# Patient Record
Sex: MALE | Race: BLACK OR AFRICAN AMERICAN | NOT HISPANIC OR LATINO | Employment: FULL TIME | URBAN - METROPOLITAN AREA
[De-identification: names, ages, dates, MRNs, and addresses within clinical notes are randomized per-mention and may not be internally consistent; named-entity substitution may affect disease eponyms.]

---

## 2018-07-11 ENCOUNTER — OFFICE VISIT - HEALTHEAST (OUTPATIENT)
Dept: CARDIOLOGY | Facility: CLINIC | Age: 38
End: 2018-07-11

## 2018-07-11 DIAGNOSIS — R07.9 CHEST PAIN WITH LOW RISK OF ACUTE CORONARY SYNDROME: ICD-10-CM

## 2018-07-11 ASSESSMENT — MIFFLIN-ST. JEOR: SCORE: 1901.69

## 2019-07-25 ENCOUNTER — OFFICE VISIT - HEALTHEAST (OUTPATIENT)
Dept: FAMILY MEDICINE | Facility: CLINIC | Age: 39
End: 2019-07-25

## 2019-07-25 DIAGNOSIS — Z00.00 ROUTINE HISTORY AND PHYSICAL EXAMINATION OF ADULT: ICD-10-CM

## 2019-07-25 DIAGNOSIS — R07.9 LEFT-SIDED CHEST PAIN: ICD-10-CM

## 2019-07-25 LAB
ANION GAP SERPL CALCULATED.3IONS-SCNC: 11 MMOL/L (ref 5–18)
BUN SERPL-MCNC: 12 MG/DL (ref 8–22)
CALCIUM SERPL-MCNC: 10.1 MG/DL (ref 8.5–10.5)
CHLORIDE BLD-SCNC: 104 MMOL/L (ref 98–107)
CHOLEST SERPL-MCNC: 199 MG/DL
CO2 SERPL-SCNC: 25 MMOL/L (ref 22–31)
CREAT SERPL-MCNC: 1.03 MG/DL (ref 0.7–1.3)
FASTING STATUS PATIENT QL REPORTED: ABNORMAL
GFR SERPL CREATININE-BSD FRML MDRD: >60 ML/MIN/1.73M2
GLUCOSE BLD-MCNC: 96 MG/DL (ref 70–125)
HDLC SERPL-MCNC: 59 MG/DL
LDLC SERPL CALC-MCNC: 97 MG/DL
POTASSIUM BLD-SCNC: 3.7 MMOL/L (ref 3.5–5)
SODIUM SERPL-SCNC: 140 MMOL/L (ref 136–145)
TRIGL SERPL-MCNC: 215 MG/DL

## 2019-07-25 ASSESSMENT — MIFFLIN-ST. JEOR: SCORE: 1831.38

## 2019-07-26 ENCOUNTER — COMMUNICATION - HEALTHEAST (OUTPATIENT)
Dept: FAMILY MEDICINE | Facility: CLINIC | Age: 39
End: 2019-07-26

## 2019-08-02 ENCOUNTER — HOSPITAL ENCOUNTER (OUTPATIENT)
Dept: CARDIOLOGY | Facility: CLINIC | Age: 39
Discharge: HOME | End: 2019-08-02

## 2019-08-02 DIAGNOSIS — R07.9 LEFT-SIDED CHEST PAIN: ICD-10-CM

## 2019-08-02 LAB
CV STRESS CURRENT BP HE: NORMAL
CV STRESS CURRENT HR HE: 103
CV STRESS CURRENT HR HE: 103
CV STRESS CURRENT HR HE: 104
CV STRESS CURRENT HR HE: 105
CV STRESS CURRENT HR HE: 105
CV STRESS CURRENT HR HE: 106
CV STRESS CURRENT HR HE: 106
CV STRESS CURRENT HR HE: 111
CV STRESS CURRENT HR HE: 115
CV STRESS CURRENT HR HE: 117
CV STRESS CURRENT HR HE: 118
CV STRESS CURRENT HR HE: 119
CV STRESS CURRENT HR HE: 121
CV STRESS CURRENT HR HE: 121
CV STRESS CURRENT HR HE: 126
CV STRESS CURRENT HR HE: 129
CV STRESS CURRENT HR HE: 130
CV STRESS CURRENT HR HE: 130
CV STRESS CURRENT HR HE: 131
CV STRESS CURRENT HR HE: 132
CV STRESS CURRENT HR HE: 143
CV STRESS CURRENT HR HE: 145
CV STRESS CURRENT HR HE: 156
CV STRESS CURRENT HR HE: 156
CV STRESS CURRENT HR HE: 165
CV STRESS CURRENT HR HE: 167
CV STRESS CURRENT HR HE: 170
CV STRESS CURRENT HR HE: 171
CV STRESS CURRENT HR HE: 171
CV STRESS CURRENT HR HE: 83
CV STRESS CURRENT HR HE: 88
CV STRESS DEVIATION TIME HE: NORMAL
CV STRESS ECHO PERCENT HR HE: NORMAL
CV STRESS EXERCISE STAGE HE: NORMAL
CV STRESS FINAL RESTING BP HE: NORMAL
CV STRESS FINAL RESTING HR HE: 105
CV STRESS MAX HR HE: 174
CV STRESS MAX TREADMILL GRADE HE: 18
CV STRESS MAX TREADMILL SPEED HE: 5
CV STRESS PEAK DIA BP HE: NORMAL
CV STRESS PEAK SYS BP HE: NORMAL
CV STRESS PHASE HE: NORMAL
CV STRESS PROTOCOL HE: NORMAL
CV STRESS RESTING PT POSITION HE: NORMAL
CV STRESS RESTING PT POSITION HE: NORMAL
CV STRESS ST DEVIATION AMOUNT HE: NORMAL
CV STRESS ST DEVIATION ELEVATION HE: NORMAL
CV STRESS ST EVELATION AMOUNT HE: NORMAL
CV STRESS TEST TYPE HE: NORMAL
CV STRESS TOTAL STAGE TIME MIN 1 HE: NORMAL
ECHO EJECTION FRACTION ESTIMATED: 60 %
STRESS ECHO BASELINE BP: NORMAL
STRESS ECHO BASELINE HR: 84
STRESS ECHO CALCULATED PERCENT HR: 96 %
STRESS ECHO LAST STRESS BP: NORMAL
STRESS ECHO LAST STRESS HR: 171
STRESS ECHO POST ESTIMATED WORKLOAD: 12.3
STRESS ECHO POST EXERCISE DUR MIN: 12
STRESS ECHO POST EXERCISE DUR SEC: 0
STRESS ECHO TARGET HR: 154

## 2019-08-05 ENCOUNTER — COMMUNICATION - HEALTHEAST (OUTPATIENT)
Dept: FAMILY MEDICINE | Facility: CLINIC | Age: 39
End: 2019-08-05

## 2019-08-09 ENCOUNTER — COMMUNICATION - HEALTHEAST (OUTPATIENT)
Dept: FAMILY MEDICINE | Facility: CLINIC | Age: 39
End: 2019-08-09

## 2020-07-30 ENCOUNTER — OFFICE VISIT - HEALTHEAST (OUTPATIENT)
Dept: FAMILY MEDICINE | Facility: CLINIC | Age: 40
End: 2020-07-30

## 2020-07-30 DIAGNOSIS — K21.9 GASTROESOPHAGEAL REFLUX DISEASE WITHOUT ESOPHAGITIS: ICD-10-CM

## 2020-07-30 DIAGNOSIS — R05.9 COUGH: ICD-10-CM

## 2020-07-30 RX ORDER — FAMOTIDINE 20 MG/1
20 TABLET, FILM COATED ORAL 2 TIMES DAILY
Qty: 60 TABLET | Refills: 1 | Status: SHIPPED | OUTPATIENT
Start: 2020-07-30 | End: 2022-01-05

## 2020-10-07 ENCOUNTER — OFFICE VISIT - HEALTHEAST (OUTPATIENT)
Dept: FAMILY MEDICINE | Facility: CLINIC | Age: 40
End: 2020-10-07

## 2020-10-07 DIAGNOSIS — R05.9 COUGH: ICD-10-CM

## 2020-10-07 DIAGNOSIS — K21.9 GASTROESOPHAGEAL REFLUX DISEASE, UNSPECIFIED WHETHER ESOPHAGITIS PRESENT: ICD-10-CM

## 2020-10-07 DIAGNOSIS — J02.9 SORE THROAT: ICD-10-CM

## 2020-10-07 DIAGNOSIS — R03.0 ELEVATED BLOOD PRESSURE READING WITHOUT DIAGNOSIS OF HYPERTENSION: ICD-10-CM

## 2020-10-07 LAB — DEPRECATED S PYO AG THROAT QL EIA: NORMAL

## 2020-10-08 LAB — GROUP A STREP BY PCR: NORMAL

## 2020-10-10 ENCOUNTER — COMMUNICATION - HEALTHEAST (OUTPATIENT)
Dept: SCHEDULING | Facility: CLINIC | Age: 40
End: 2020-10-10

## 2020-11-06 ENCOUNTER — OFFICE VISIT - HEALTHEAST (OUTPATIENT)
Dept: FAMILY MEDICINE | Facility: CLINIC | Age: 40
End: 2020-11-06

## 2020-11-06 DIAGNOSIS — I10 ESSENTIAL HYPERTENSION: ICD-10-CM

## 2020-11-06 DIAGNOSIS — Z72.0 TOBACCO USE: ICD-10-CM

## 2020-11-06 RX ORDER — HYDROCHLOROTHIAZIDE 25 MG/1
25 TABLET ORAL DAILY
Qty: 30 TABLET | Refills: 5 | Status: SHIPPED | OUTPATIENT
Start: 2020-11-06 | End: 2022-01-05

## 2021-05-27 VITALS
SYSTOLIC BLOOD PRESSURE: 174 MMHG | OXYGEN SATURATION: 98 % | HEART RATE: 90 BPM | TEMPERATURE: 98.7 F | DIASTOLIC BLOOD PRESSURE: 104 MMHG | RESPIRATION RATE: 16 BRPM

## 2021-05-27 VITALS
TEMPERATURE: 98 F | OXYGEN SATURATION: 98 % | SYSTOLIC BLOOD PRESSURE: 132 MMHG | HEART RATE: 88 BPM | DIASTOLIC BLOOD PRESSURE: 95 MMHG

## 2021-05-30 NOTE — PROGRESS NOTES
Assessment/Plan:        1. Routine history and physical examination of adult    - Basic Metabolic Panel  - Lipid Profile  - Tdap vaccine,  8yo or older,  IM    2. Left-sided chest pain  Exam findings were discussed   Patient concerned with underlying heart disease.   Condition likely musculoskeletal    Plan;   Rule out cardiac etiology   - Echo Stress Exercise; Future      We will follow-up to the results of the study and manage accordingly.      ______________________________________________________________________     Brent CHADD Lau is a 39 y.o. male here for healthcare establishment, routine physical.     Other issues to discuss/ evaluate today:   Has been having a left upper chest/shoulder pain since July 4, taking a new job which she is requiring him to work about 11 to 12 hours a shift and lifting all day long.  He has since left the job but continued to have the pain that side said to be more of a tightness feeling worse with standing and better with sitting.  Can also point to over the location of the pain is as if he can grab where it is.  He is concerned that this might be related to his heart with been a smoker and has been a family history of diabetes.          Past Medical History:   Diagnosis Date     Acid reflux        No past surgical history on file.     Family History   Problem Relation Age of Onset     Diabetes Mother      No Medical Problems Sister      No Medical Problems Brother        Social History     Socioeconomic History     Marital status:      Spouse name: None     Number of children: None     Years of education: None     Highest education level: None   Occupational History     None   Social Needs     Financial resource strain: None     Food insecurity:     Worry: None     Inability: None     Transportation needs:     Medical: None     Non-medical: None   Tobacco Use     Smoking status: Current Every Day Smoker     Types: Cigarettes     Smokeless tobacco: Former User   Substance  "and Sexual Activity     Alcohol use: None     Drug use: None     Sexual activity: None   Lifestyle     Physical activity:     Days per week: None     Minutes per session: None     Stress: None   Relationships     Social connections:     Talks on phone: None     Gets together: None     Attends Anabaptist service: None     Active member of club or organization: None     Attends meetings of clubs or organizations: None     Relationship status: None     Intimate partner violence:     Fear of current or ex partner: None     Emotionally abused: None     Physically abused: None     Forced sexual activity: None   Other Topics Concern     None   Social History Narrative     None        No current outpatient medications on file.     Immunization History   Administered Date(s) Administered     Influenza,seasonal quad, PF, 36+MOS 12/29/2014     MMR 10/22/1997     Td, Adult, Absorbed 09/20/1994     Tdap 12/29/2014, 07/25/2019        ______________________________________________________________________     ROS:  General : negative  Ophthalmic : negative  ENT : negative  Respiratory : no cough, shortness of breath, or wheezing  Cardiovascular : no chest pain or dyspnea on exertion  Gastrointestinal : no abdominal pain, change in bowel habits, or black or bloody stools  Genito-Urinary : no dysuria, trouble voiding, or hematuria  Musculoskeletal : See HPI   Neurological : negative  Dermatological : negative    Allergy and Immunology : negative  Hematological and Lymphatic : negative  Endocrine : negative     ______________________________________________________________________     Exam:    Wt Readings from Last 3 Encounters:   07/25/19 196 lb 8 oz (89.1 kg)   07/11/18 212 lb (96.2 kg)   07/08/18 212 lb 7 oz (96.4 kg)     BP Readings from Last 3 Encounters:   07/25/19 136/88   07/11/18 124/78   07/08/18 140/85     /88 (Patient Site: Right Arm, Patient Position: Sitting, Cuff Size: Adult Large)   Pulse 93   Ht 5' 11.5\" (1.816 " m)   Wt 196 lb 8 oz (89.1 kg)   SpO2 98%   BMI 27.02 kg/m       General appearance - alert, well appearing, and in no distress  Mental status - alert, oriented to person, place, and time  Eyes - pupils equal and reactive, extraocular eye movements intact  Ears - bilateral TM's and external ear canals normal  Nose - normal and patent, no erythema, discharge or polyps  Mouth - mucous membranes moist, pharynx normal without lesions  Neck - supple, no significant adenopathy, thyroid exam: thyroid is normal in size without nodules or tenderness  Lymphatics - no palpable lymphadenopathy  Chest - clear to auscultation, no wheezes, rales or rhonchi, symmetric air entry  Heart - normal rate, regular rhythm, normal S1, S2, no murmurs, rubs, clicks or gallops  Abdomen - soft, nontender, nondistended, no masses or organomegaly   Male - no penile lesions or discharge, no testicular masses or tenderness, no hernias  Back exam - full range of motion, no tenderness, palpable spasm or pain on motion  Neurological - alert, oriented, normal speech, no focal findings or movement / tremor disorder noted  Musculoskeletal - no joint tenderness, deformity or swelling  Extremities - peripheral pulses normal, no pedal edema, no clubbing or cyanosis  Skin - normal coloration and turgor, no rashes, no suspicious skin lesions noted

## 2021-05-31 NOTE — TELEPHONE ENCOUNTER
Test Results  Who is calling?:  Patient  Who ordered the test:  Bo Ribeiro MD  Type of test: Other: Stress Echo  Date of test:  8/2/19  Where was the test performed:  [facility]  What are your questions/concerns?:  Patient was asking for results. Read to patient the findings noted on letter sent to patient on 8/5/19, patient made aware that results are on his my chart and that a letter is in the mail  Okay to leave a detailed message?:  No  No call back needed.

## 2021-06-01 VITALS — HEIGHT: 72 IN | WEIGHT: 212 LBS | BODY MASS INDEX: 28.71 KG/M2

## 2021-06-03 VITALS — WEIGHT: 196.5 LBS | BODY MASS INDEX: 26.61 KG/M2 | HEIGHT: 72 IN

## 2021-06-05 VITALS
BODY MASS INDEX: 22.69 KG/M2 | HEART RATE: 105 BPM | DIASTOLIC BLOOD PRESSURE: 78 MMHG | SYSTOLIC BLOOD PRESSURE: 132 MMHG | TEMPERATURE: 98.9 F | OXYGEN SATURATION: 98 % | WEIGHT: 165 LBS

## 2021-06-10 NOTE — PROGRESS NOTES
Assessment/Plan:   Cough  Gastroesophageal reflux disease without esophagitis  Cough x 1 week. Intermittent low grade fever. Recent covid test negative. CXR normal. Consider URI viral/bronchitis, GERD, allergy.   We will start pepcid for 2 weeks, reflux precautions.   I discussed red flag symptoms, return precautions to clinic/ER and follow up care with patient/parent.  Expected clinical course, symptomatic cares advised. Questions answered. Patient/parent amenable with plan.  - XR Chest 2 Views  - famotidine (PEPCID) 20 MG tablet; Take 1 tablet (20 mg total) by mouth 2 (two) times a day.  Dispense: 60 tablet; Refill: 1    The chest xray looks normal.   You may have a bronchial type cough commonly related to a cold, smoking or allergy.  The harsh cough and vomiting associated with cough may be related to acid reflux - stomach contents coming and causing the gag reflex to trigger.   Alcohol, nicotine, spicy or acidic foods, tomato, chocolate, carbonated drinks, caffeine can all aggravate reflux.   Also eating and drinking large meals or late at night or lying down right after can make this happen.   Follow reflux avoidance precautions  Consider taking Pepcid AC twice daily or omeprazole daily to calm things down if persistent. I can send in prescriptions for you if needed.   Cough drops are fine to settle the cough as needed.   May use Mucinex DM (or generic equivalent) to loosen up any mucus in the chest so you don't need to cough so hard.   I would recommend keeping to yourself another few days (10 days from onset of cough and 3 days no fever is the guideline unless the cough or other symptoms are worse and then either recheck the cough or wait until it is improving.     If worse or no better then we can reassess - fever, cough productive of dark phlegm, wheeze or shortness of breath.     Patient wore mask throughout the visit.   Provider and rooming staff and radiology staff wore mask, shield and gloves.      Subjective:      Brent Lau is a 40 y.o. male who presents for evaluation of cough. This started about a week ago. Thursday he developed ST. Friday he developed cough associated with post tussive emesis. Saturday he had low grade fever Tm99.1. He had a covid test on Saturday at [facility] and that result came back yesterday as negative. He had low grade fever again today and has continued to have some cough, more chest congestion today. No URI sxs, ST has resolved.  No loss of smell or taste, but he has poor appetite - has mainly been eating fruit and vitamin water. No fatigue or myalgia. No N/V/D. He does have heartburn.   He is not currently working due to pandemic.   His sister has URI sxs. No other known ill contacts.   Daily smoker.     No Known Allergies  No current outpatient medications on file prior to visit.     No current facility-administered medications on file prior to visit.      Patient Active Problem List   Diagnosis     H/O echocardiogram       Objective:     BP (!) 132/95   Pulse 88   Temp 98  F (36.7  C)   SpO2 98%     Physical  General Appearance: Alert, cooperative, no distress, AVSS  Head: Normocephalic, without obvious abnormality, atraumatic  Eyes: Conjunctivae are normal.   Ears: Normal TMs and external ear canals, both ears  Nose: No significant congestion.  Throat: Throat is normal.  No exudate.  No significant lesions  Neck: No adenopathy  Lungs: Clear to auscultation bilaterally, respirations unlabored  Heart: Regular rate and rhythm  Abdomen: Soft, non-tender, no masses  Extremities: No lower extremity edema or calf tenderness  Skin: no rashes or lesions  Psychiatric: Patient has a normal mood and affect.      CXR obtained and viewed by me showed no definite infiltrates or effusions. Normal heart size.     Xr Chest 2 Views    Result Date: 7/30/2020  EXAM DATE:         07/30/2020 EXAM: X-RAY CHEST, 2 VIEWS, FRONTAL AND LATERAL LOCATION: [facility]  DATE/TIME: 7/30/2020 2:30 PM INDICATION: cough x 1 week, covid test negative COMPARISON: None. IMPRESSION: Negative chest.

## 2021-06-12 NOTE — PROGRESS NOTES
Subjective     Brent Lau is a 40 y.o. male who presents to clinic today for the following health issues:    HPI   Follow-up ER visit for elevated blood pressure -patient was seen at the [facility] emergency room on 11/2/2024 with elevated blood pressure readings noted over the previous few days.  Most significant elevation was noted to be in the 170 systolic.  He noted no associated symptoms with these blood pressure elevations.  In the emergency room blood pressure readings varied from the 130s to 150s systolic over 70s to 90s diastolic without intervention.  Patient was interested in beginning a blood pressure lowering medication at that time and was prescribed hydrochlorothiazide 25 mg daily after having BMP checked (normal).  Patient has been taking this consistently since his ER visit.  Today he is noted to have improved blood pressure reading.  He has also been using his home blood pressure cuff and notes similar readings at home.  No headaches, chest pain, palpitations, shortness of breath, or peripheral swelling.  Patient does have a significant family history of high blood pressure.    Patient Active Problem List   Diagnosis     H/O echocardiogram     Gastroesophageal reflux disease, unspecified whether esophagitis present     No past surgical history on file.    Social History     Tobacco Use     Smoking status: Current Every Day Smoker     Types: Cigarettes     Smokeless tobacco: Former User   Substance Use Topics     Alcohol use: Not on file     Family History   Problem Relation Age of Onset     Diabetes Mother      No Medical Problems Sister      No Medical Problems Brother          Current Outpatient Medications   Medication Sig Dispense Refill     famotidine (PEPCID) 20 MG tablet Take 1 tablet (20 mg total) by mouth 2 (two) times a day. 60 tablet 1     hydroCHLOROthiazide (HYDRODIURIL) 25 MG tablet Take 1 tablet (25 mg total) by mouth daily for 7 days. 30 tablet 5     No current  facility-administered medications for this visit.      No Known Allergies     Review of Systems   Negative except as noted above in HPI.       Objective    /78 (Patient Position: Sitting)   Pulse (!) 105   Temp 98.9  F (37.2  C) (Oral)   Wt 165 lb (74.8 kg)   SpO2 98%   BMI 22.69 kg/m    Body mass index is 22.69 kg/m .  Physical Exam   Physical Examination: General appearance - alert, well appearing, and in no distress and oriented to person, place, and time  Mental status - normal mood, behavior, speech, dress, motor activity, and thought processes  Neck - supple, no significant adenopathy  Chest - clear to auscultation, no wheezes, rales or rhonchi, symmetric air entry  Heart - normal rate, regular rhythm, normal S1, S2, no murmurs, rubs, clicks or gallops  Extremities - no pedal edema noted      Component      Latest Ref Rng & Units 11/2/2020   Sodium      136 - 145 mmol/L 142   Potassium      3.5 - 5.0 mmol/L 3.6   Chloride      98 - 107 mmol/L 103   CO2      22 - 31 mmol/L 26   Anion Gap, Calculation      5 - 18 mmol/L 13   Glucose      70 - 125 mg/dL 90   Calcium      8.5 - 10.5 mg/dL 8.5   BUN      8 - 22 mg/dL 6 (L)   Creatinine      0.70 - 1.30 mg/dL 0.64 (L)   GFR MDRD Af Amer      >60 mL/min/1.73m2 >60   GFR MDRD Non Af Amer      >60 mL/min/1.73m2 >60       Assessment & Plan     Brent was seen today for hypertension.    Diagnoses and all orders for this visit:    Essential hypertension  -     hydroCHLOROthiazide (HYDRODIURIL) 25 MG tablet; Take 1 tablet (25 mg total) by mouth daily for 7 days.    Tobacco use    Tobacco Cessation:   reports that he has been smoking cigarettes. He has quit using smokeless tobacco.  I have counseled the patient for tobacco cessation and the follow up will occur  at the next visit.     Patient declines influenza vaccine today.    Return in about 6 months (around 5/6/2021) for Follow up with Dr Ribeiro.    Miguel Montejo MD  [facility]  [place]

## 2021-06-12 NOTE — PATIENT INSTRUCTIONS - HE
Please continue your current blood pressure medication unchanged. Refills for this medication were sent to your pharmacy.  Please schedule a routine follow-up visit with Dr Ribeiro in 6 months.  Please bring your home blood pressure monitor with you to your next visit so that it can be checked against our monitors to ensure accuracy.

## 2021-06-16 PROBLEM — Z92.89 H/O ECHOCARDIOGRAM: Status: ACTIVE | Noted: 2019-08-05

## 2021-06-18 NOTE — PATIENT INSTRUCTIONS - HE
Patient Instructions by Nya Jaimes MD at 7/30/2020  1:20 PM     Author: Nya Jaimes MD Service: -- Author Type: Physician    Filed: 7/30/2020  3:02 PM Encounter Date: 7/30/2020 Status: Addendum    : Nya Jaimes MD (Physician)    Related Notes: Original Note by Nya Jaimes MD (Physician) filed at 7/30/2020  3:00 PM       The chest xray looks normal.   You may have a bronchial type cough commonly related to a cold, smoking or allergy.  The harsh cough and vomiting associated with cough may be related to acid reflux - stomach contents coming and causing the gag reflex to trigger.   Alcohol, nicotine, spicy or acidic foods, tomato, chocolate, carbonated drinks, caffeine can all aggravate reflux.   Also eating and drinking large meals or late at night or lying down right after can make this happen.   Follow reflux avoidance precautions  Consider taking Pepcid AC twice daily or omeprazole daily to calm things down if persistent. I can send in prescriptions for you if needed.   Cough drops are fine to settle the cough as needed.   May use Mucinex DM (or generic equivalent) to loosen up any mucus in the chest so you don't need to cough so hard.   I would recommend keeping to yourself another few days (10 days from onset of cough and 3 days no fever is the guideline unless the cough or other symptoms are worse and then either recheck the cough or wait until it is improving.     If worse or no better then we can reassess - fever, cough productive of dark phlegm, wheeze or shortness of breath.       Patient Education     How Acid Reflux Affects Your Throat    Do you have to clear your throat or cough often? Are you hoarse? Do you have trouble swallowing or heartburn or pain in your throat? If you have these or other throat symptoms, you may have acid reflux. This occurs when stomach acid flows back up and irritates your throat.  Why you  have throat symptoms  There are muscles (esophageal sphincters) at both ends of the tube that carries food to your stomach (the esophagus). These muscles relax to let food pass. Then they tighten to keep stomach acid down. When the lower esophageal sphincter (LES) doesnt tighten enough, acid can flow back (reflux) from your stomach into your esophagus. This may cause heartburn. In some cases the upper esophageal sphincter (UES) also doesnt work well. Then acid can travel higher and enter your throat (pharynx). In many cases, this causes throat symptoms.  Common throat symptoms    Need to clear your throat often    Feeling like youre choking    Long-term (chronic) cough    Hoarseness    Trouble swallowing    Feel like you have a lump in your throat    Sour or acid taste    Sore throat that keeps coming back    Date Last Reviewed: 7/1/2016 2000-2019 [employer]. [address] [place] [id number]. All rights reserved. This information is not intended as a substitute for professional medical care. Always follow your healthcare professional's instructions.           Patient Education     Discharge Instructions for Gastroesophageal Reflux Disease (GERD)  Gastroesophageal reflux disease (GERD) is a backflow of acid from the stomach into the swallowing tube (esophagus).  Home care  These home care steps can help you manage GERD:    Maintain a healthy weight. Get help to lose any extra pounds.    Avoid lying down after meals.    Avoid eating late at night.    Elevate the head of your bed by 6 inches. You can do this by placing wooden blocks or bed risers under the head of your bed.    Avoid wearing tight-fitting clothes.    Avoid foods that might irritate your stomach, such as the following:  ? Alcohol  ? Fat  ? Chocolate  ? Caffeine  ? Spearmint or peppermint    Talk to your healthcare provider if you are taking any of the following medicines. These medicines can make GERD symptoms worse:  ? Calcium  channel blockers  ? Theophylline  ? Anticholinergic medicines, such as oxybutynin and benzatropine    Begin an exercise program. Ask your healthcare provider how to get started. You can benefit from simple activities, such as walking or gardening.    Break the smoking habit. Enroll in a stop-smoking program to improve your chances of success.    Limit alcohol intake to no more than 2 drinks a day.    Take your medicines exactly as directed. Dont skip doses.    Avoid over-the-counter nonsteroidal anti-inflammatory medicines, such as aspirin and ibuprofen, unless recommended by your healthcare provider for certain conditions.     If possible, avoid nitrates (heart medicines, such as nitroglycerin and isosorbide dinitrate ).  Follow-up care  Make a follow-up appointment as directed by our staff.     When to call the healthcare provider  Call your healthcare provider immediately if you have any of the following:    Trouble swallowing    Pain when swallowing    Feeling of food caught in your chest or throat    Pain in the neck, chest, or back    Heartburn that causes you to vomit    Vomiting blood    Black or tarry stools (from digested blood)    More saliva (watering of the mouth) than usual    Weight loss of more than 3% to 5% of your total body weight in a month    Hoarseness or sore throat that wont go away    Choking, coughing, or wheezing   Date Last Reviewed: 7/1/2016 2000-2019 [email or web address]. [address] [place] [address], [place] [address]. All rights reserved. This information is not intended as a substitute for professional medical care. Always follow your healthcare professional's instructions.

## 2021-06-18 NOTE — PATIENT INSTRUCTIONS - HE
"Patient Instructions by Rebecca Koo CNP at 10/7/2020 10:10 AM     Author: Rebecca Koo CNP Service: -- Author Type: Nurse Practitioner    Filed: 10/7/2020 11:19 AM Encounter Date: 10/7/2020 Status: Addendum    : Rebecca Koo CNP (Nurse Practitioner)    Related Notes: Original Note by Rebecca Koo CNP (Nurse Practitioner) filed at 10/7/2020 11:05 AM       Take your Pepcid acid reflux pill in the morning and evening (dinnertime) every day.    Work to reduce or stop smoking.      Recommend quarantine at home until your coronavirus test comes back - 2-3 days on average.  Look for the test results in your [email or web address] account.     If you are dry heaving after eating, eat smaller meals more frequently.    Strep test is negative - no strep.     Blood pressure is very high - please make appointment with \"Dr. AHUJA\" at the  for next week.  Go to ER with bad symptoms of high blood pressure in the handout.      Patient Education     High Blood Pressure, New, Begin Treatment  Your blood pressure was high enough today to start treatment with medicines. Often healthcare providers dont know what causes high blood pressure (hypertension). But it can be controlled with lifestyle changes and medicines. High blood pressure usually has no symptoms. But it can sometimes cause headache, dizziness, blurred vision, a rushing sound in your ears, chest pain, or shortness of breath. But even without symptoms, high blood pressure thats not treated raises your risk for heart attack, heart failure, and stroke. High blood pressure is a serious health risk and shouldnt be ignored.    Blood pressure measurements are given as 2 numbers. Systolic blood pressure is the upper number. This is the pressure when the heart contracts. Diastolic blood pressure is the lower number. This is the pressure when the heart relaxes between beats. You will see your blood pressure readings written together. For example, a person with a " systolic pressure of 118 and a diastolic pressure of 78 will have 118/78 written in the medical record.   Blood pressure is categorized as normal, elevated, or stage 1 or stage 2 high blood pressure:    Normal blood pressure is systolic of less than 120 and diastolic of less than 80 (120/80)    Elevated blood pressure is systolic of 120 to 129 and diastolic less than 80    Stage 1 high blood pressure is systolic is 130 to 139 or diastolic between 80 to 89    Stage 2 high blood pressure is when systolic is 140 or higher or the diastolic is 90 or higher  Home care  If you have high blood pressure, you should do what is listed below to lower your blood pressure. If you are taking medicines for high blood pressure, these methods may reduce or end your need for medicines in the future.    Begin a weight-loss program if you are overweight.    Cut back on how much salt you get in your diet. Heres how to do this:  ? Dont eat foods that have a lot of salt. These include olives, pickles, smoked meats, and salted potato chips.  ? Dont add salt to your food at the table.  ? Use only small amounts of salt when cooking.  ? Review food labels to track how much salt is in prepared foods.  ? When eating out, ask that no additional salt be added to your food order.    Begin an exercise program. Talk with your healthcare provider about the type of exercise program that would be best for you. It doesn't have to be hard. Even brisk walking for 20 minutes 3 times a week is a good form of exercise.    Dont take medicines that have heart stimulants. This includes many over-the-counter cold and sinus decongestant pills and sprays, as well as diet pills. Check the warnings about high blood pressure on the label. Before purchasing any over-the-counter medicines or supplements, always ask the pharmacist about the product's potential interaction with your high blood pressure and your high blood pressure medicines.    Stimulants such as  amphetamine or cocaine could be lethal for someone with high blood pressure. Never take these.    Limit how much caffeine you get in your diet. Switch to caffeine-free products.    Stop smoking. If you are a long-time smoker, this can be hard. Enroll in a stop-smoking program to make it more likely that you will quit for good.    Learn how to handle stress. This is an important part of any program to lower blood pressure. Learn about relaxation methods like meditation, yoga, or biofeedback.    If your provider prescribed medicines, take them exactly as directed. Missing doses may cause your blood pressure get out of control.    If you miss a dose or doses, check with your healthcare provider or pharmacist about what to do.    Consider buying an automatic blood pressure machine. Your provider can make a recommendation. You can get one of these at most pharmacies.  The American Heart Association recommends the following guidelines for home blood pressure monitoring:    Don't smoke or drink coffee or other caffeinated drinks for 30 minutes before taking your blood pressure.    Go to the bathroom before the test.    Relax for 5 minutes before taking the measurement.    Sit with your back supported (don't sit on a couch or soft chair); keep your feet on the floor uncrossed. Place your arm on a solid flat surface (like a table) with the upper part of the arm at heart level. Place the middle of the cuff directly above the bend of the elbow. Check the monitor's instruction manual for an illustration.    Take multiple readings. When you measure, take 2 to 3 readings one minute apart and record all of the results.    Take your blood pressure at the same time every day, or as your healthcare provider recommends.    Record the date, time, and blood pressure reading.    Take the record with you to your next medical appointment. If your blood pressure monitor has a built-in memory, simply take the monitor with you to your next  appointment.    Call your provider if you have several high readings. Don't be frightened by a single high blood pressure reading, but if you get several high readings, check in with your healthcare provider.    Note: When blood pressure reaches a systolic (top number) of 180 or higher OR diastolic (bottom number) of 110 or higher, seek emergency medical treatment.  Follow-up care  Because a new blood pressure medicine was started today, its important that you have your blood pressure rechecked. This is to make sure that the medicine is working and that you have no serious side effects. Keep all your follow up appointments. Write down medicine and blood pressure questions and bring them to your next appointment. If you have pressing concerns about your new medicine or your blood pressure, call your provider. Unless told otherwise, follow up with your healthcare provider or this facility within the next 3 days.  When to seek medical advice  Call your healthcare provider right away if any of these occur:    Blood pressure reaches a systolic (top number) of 180 or higher, OR diastolic (bottom number) of 110 or higher, seek emergency medical treatment.    Chest pain or shortness of breath    Severe headache    Throbbing or rushing sound in the ears    Nosebleed    Sudden severe pain in your belly (abdomen)    Extreme drowsiness, confusion, or fainting    Dizziness or dizziness with a spinning sensation (vertigo)    Weakness of an arm or leg or one side of the face    You have problems speaking or seeing   Date Last Reviewed: 12/1/2016 2000-2017 [employer] [address] [place] [address], [place] [address]. All rights reserved. This information is not intended as a substitute for professional medical care. Always follow your healthcare professional's instructions.

## 2021-06-19 NOTE — LETTER
Letter by Bo Ribeiro MD at      Author: Bo Ribeiro MD Service: -- Author Type: --    Filed:  Encounter Date: 7/26/2019 Status: (Other)         Brent Lau  [id number] [id number]             July 26, 2019         Dear Mr. Lau,    Below are the results from your recent visit:    Resulted Orders   Basic Metabolic Panel   Result Value Ref Range    Sodium 140 136 - 145 mmol/L    Potassium 3.7 3.5 - 5.0 mmol/L    Chloride 104 98 - 107 mmol/L    CO2 25 22 - 31 mmol/L    Anion Gap, Calculation 11 5 - 18 mmol/L    Glucose 96 70 - 125 mg/dL    Calcium 10.1 8.5 - 10.5 mg/dL    BUN 12 8 - 22 mg/dL    Creatinine 1.03 0.70 - 1.30 mg/dL    GFR MDRD Af Amer >60 >60 mL/min/1.73m2    GFR MDRD Non Af Amer >60 >60 mL/min/1.73m2    Narrative    Fasting Glucose reference range is 70-99 mg/dL per  American Diabetes Association (ADA) guidelines.   Lipid Profile   Result Value Ref Range    Triglycerides 215 (H) <=149 mg/dL    Cholesterol 199 <=199 mg/dL    LDL Calculated 97 <=129 mg/dL    HDL Cholesterol 59 >=40 mg/dL    Patient Fasting > 8hrs? Unknown                    Elevated Triglycerides    Make an attempt to improve diet, cut back on the carbs and fats,  and exercise  efficiently      Other Labs normal           Please call with questions or contact us using [employer].    Sincerely,        Electronically signed by Bo Ribeiro MD

## 2021-06-19 NOTE — LETTER
Letter by Bo Ribeiro MD at      Author: Bo Ribeiro MD Service: -- Author Type: --    Filed:  Encounter Date: 8/5/2019 Status: (Other)         Brent Lau  [id number] [facility] [address]             August 5, 2019         Dear Mr. Lau,    Below are the results from your recent visit:    Resulted Orders   Echo Stress Exercise   Result Value Ref Range    Pharmacologic Protocol  Stress Echo     Test Type Treadmill     Baseline HR 84     Baseline /84     Last Stress      Last Stress /80     Target      PERCENT HR 85%     Exercise duration (min) 12     Exercise duration (sec) 0     Estimated workload 12.3     ST Deviation Elevation V3 5.4mm     Deviation Time aVL -0.9mm     ST Elevation Amount V3 7.1mm     ST Deviation Amount he aVR -2.3mm     Final Resting /64     Final Resting      Max Treadmill Speed 5.0     Max Treadmill Grade 18.0     Peak Systolic /60     Peak Diastolic /80     Max      Stress Phase Resting     Stress Resting Pt Position STANDING     Current HR 83     Current /84     Stress Phase Resting     Stress Resting Pt Position MANUAL EVENT     Current      Current /80     Stress Phase Stress     Stage Minute EXE 00:00     Exercise Stage STAGE 1     Current HR 88     Current /74     Stress Phase Stress     Stage Minute EXE 01:00     Exercise Stage STAGE 1     Current      Current /74     Stress Phase Stress     Stage Minute EXE 02:00     Exercise Stage STAGE 1     Current      Current /74     Stress Phase Stress     Stage Minute EXE 02:10     Exercise Stage STAGE 1     Current      Current /70     Stress Phase Stress     Stage Minute EXE 03:00     Exercise Stage STAGE 2     Current      Current /70     Stress Phase Stress     Stage Minute EXE 04:00     Exercise Stage STAGE 2     Current      Current /70     Stress Phase Stress      Stage Minute EXE 05:00     Exercise Stage STAGE 2     Current      Current /70     Stress Phase Stress     Stage Minute EXE 05:09     Exercise Stage STAGE 2     Current      Current /64     Stress Phase Stress     Stage Minute EXE 06:00     Exercise Stage STAGE 3     Current      Current /64     Stress Phase Stress     Stage Minute EXE 07:00     Exercise Stage STAGE 3     Current      Current /64     Stress Phase Stress     Stage Minute EXE 07:57     Exercise Stage STAGE 3     Current      Current /80     Stress Phase Stress     Stage Minute EXE 08:00     Exercise Stage STAGE 3     Current      Current /80     Stress Phase Stress     Stage Minute EXE 09:00     Exercise Stage STAGE 4     Current      Current /80     Stress Phase Stress     Stage Minute EXE 09:32     Exercise Stage STAGE 4     Current      Current /80     Stress Phase Stress     Stage Minute EXE 10:00     Exercise Stage STAGE 4     Current      Current /80     Stress Phase Stress     Stage Minute EXE 10:49     Exercise Stage STAGE 4     Current      Current /80     Stress Phase Stress     Stage Minute EXE 11:00     Exercise Stage STAGE 4     Current      Current /80     Stress Phase Stress     Stage Minute EXE 12:00     Exercise Stage STAGE 5     Current      Current /80     Stress Phase Stress     Stage Minute EXE 12:00     Exercise Stage STAGE 5     Current      Current /80     Stress Phase Recovery     Stage Minute REC 00:51     Exercise Stage Recovery     Current      Current /60     Stress Phase Recovery     Stage Minute REC 00:59     Exercise Stage Recovery     Current      Current /60     Stress Phase Recovery     Stage Minute REC 01:33     Exercise Stage Recovery     Current      Current /60     Stress Phase Recovery     Stage Minute REC 01:59      Exercise Stage Recovery     Current      Current /60     Stress Phase Recovery     Stage Minute REC 02:59     Exercise Stage Recovery     Current      Current /60     Stress Phase Recovery     Stage Minute REC 03:32     Exercise Stage Recovery     Current      Current /68     Stress Phase Recovery     Stage Minute REC 03:59     Exercise Stage Recovery     Current      Current /68     Stress Phase Recovery     Stage Minute REC 04:59     Exercise Stage Recovery     Current      Current /68     Stress Phase Recovery     Stage Minute REC 05:41     Exercise Stage Recovery     Current      Current /64     Stress Phase Recovery     Stage Minute REC 05:59     Exercise Stage Recovery     Current      Current /64     Stress Phase Recovery     Stage Minute REC 06:59     Exercise Stage Recovery     Current      Current /64     Stress Phase Recovery     Stage Minute REC 07:35     Exercise Stage Recovery     Current      Current /64     Calculated Percent HR 96 %    Echo LVEF Estimated 60 %    Narrative      Stress echocardiogram is negative for inducible ischemia.    The stress electrocardiogram is negative for inducible ischemic EKG   changes.    No chest pain symptoms reported during exercise.    The patient's exercise tolerance was normal.    Left ventricle ejection fraction is normal. The estimated left   ventricular ejection fraction is 60%.    No previous study for comparison.                  Normal echocardiogram        Please call with questions or contact us using [employer].    Sincerely,        Electronically signed by Bo Ribeiro MD

## 2021-06-19 NOTE — PROGRESS NOTES
"CARDIOLOGY CLINIC CONSULT NOTE     Assessment/Plan:   1. Untreated obstructive sleep apnea.  I have referred Mr. Lau to a sleep clinic for formal sleep apnea evaluation  2. Tobacco abuse.  Discussed quit plan for 10 minutes.  3. Report of \"chest pain\".  Denied by the patient and no record in the ER evaluation.    Follow up as needed     History of Present Illness:     It is my pleasure to see Brent Lau at the [facility] clinic for evaluation of chest pain.  He is referred by Dr. Padilla at [place]'s emergency room, where he presented with symptoms of dizziness.  Her notes suggest no reported chest pain.  Patient reports he is here to have a sleep apnea evaluation.    Brent Lau is a 37 y.o. male with a past medical history of tobacco abuse, and obstructive sleep apnea for which she was diagnosed by sleep study several years ago.  He moved out of town and when he moved back lost his CPAP device.  He has developed fatigue and lightheadedness, which he knows are symptoms of poor sleep.  He is interested in a referral to a sleep clinic.    He denies chest pains or change in activity tolerance.  He had some muscle twitches in his left shoulder that have since resolved and are not at all concerning to him.  He does smoke but has plans to quit tomorrow.  We discussed for 10 minutes his quit strategy.  He plans on going cold turkey and then we discussed exercising on those occasions when he is at work or at home and desires a cigarette is a healthy alternative to eating which is what he is done on previous occasions when he quit smoking.    Past Medical History:   There is no problem list on file for this patient.  Obstructive sleep apnea, no longer using CPAP    Past Surgical History:   History reviewed. No pertinent surgical history.    Family History:   History reviewed. No pertinent family history.  Family history reviewed and is not pertinent to the chief complaint or presenting " "problem    Social History:    reports that he has been smoking Cigarettes.  He has quit using smokeless tobacco.    Exercise: Walks his dog occasionally    Sleep: Poorly restorative off of CPAP    Meds:     No current outpatient prescriptions on file prior to visit.     No current facility-administered medications on file prior to visit.        Allergies:   Review of patient's allergies indicates no known allergies.    Review of Systems:     General: WNL  Eyes: WNL  Ears/Nose/Throat: WNL  Lungs: Snoring  Heart: Chest Pain  Stomach: Heartburn  Bladder: WNL  Muscle/Joints: Joint Pain, Muscle Pain  Skin: WNL  Nervous System: Dizziness  Mental Health: Depression, Anxiety     Blood: WNL        Objective:      Physical Exam  212 lb (96.2 kg)  5' 11.5\" (1.816 m)  Body mass index is 29.16 kg/(m^2).  /78 (Patient Site: Right Arm, Patient Position: Sitting, Cuff Size: Adult Large)  Pulse 88  Resp 16  Ht 5' 11.5\" (1.816 m)  Wt 212 lb (96.2 kg)  BMI 29.16 kg/m2    General Appearance : Awake, Alert, No acute distress  HEENT: No Scleral icterus; the mucous membranes were pink and moist.  Conjunctivae not injected  Neck:  No cervical bruits, jugular venous distention, or thyromegaly   Chest: The spine was straight  Lungs: Respirations unlabored; the lungs are clear to auscultation.  No wheezing   Cardiovascular:  Normal point of maximal impulse.  Auscultation reveals normal first and second heart sounds with no murmurs, rubs, or gallops.  Carotid, radial, and dorsalis pedal pulses and intact.    Abdomen: No organomegaly, masses, bruits, or tenderness. Bowels sounds are present  Extremities: No edema  Skin: No xanthelasma. Warm, Dry.  Musculoskeletal: No tenderness.  Neurologic: Alert and oriented ×3.      EKG(personally reviewed):  7/8/2018: Normal sinus rhythm.  Normal ECG      Lab Review   Lab Results   Component Value Date     07/08/2018    K 3.8 07/08/2018     07/08/2018    CO2 25 07/08/2018    BUN 13 " 07/08/2018    CREATININE 1.06 07/08/2018    CALCIUM 9.4 07/08/2018     Lab Results   Component Value Date    WBC 5.0 07/08/2018    HGB 15.0 07/08/2018    HCT 42.8 07/08/2018    MCV 89 07/08/2018     07/08/2018     No results found for: CHOL, TRIG, HDL, LDLCALC  Lab Results   Component Value Date    TROPONINI <0.01 07/08/2018     No results found for: BNP  No results found for: TSH    Constantino Sanford MD [facility]    His note created using Dragon voice recognition software. Sound alike errors may have escaped editing.

## 2021-06-20 NOTE — LETTER
Letter by Rebecca Koo CNP at      Author: Rebecca Koo CNP Service: -- Author Type: --    Filed:  Encounter Date: 10/7/2020 Status: (Other)         October 7, 2020     Patient: Brent Lau   YOB: 1980   Date of Visit: 10/7/2020       To Whom It May Concern:    It is my medical opinion that Brent Lau is being tested for covid and should remain quarantined until test results are back.  .    If you have any questions or concerns, please don't hesitate to call.    Sincerely,        Electronically signed by Rebecca Koo CNP

## 2021-06-26 ENCOUNTER — HEALTH MAINTENANCE LETTER (OUTPATIENT)
Age: 41
End: 2021-06-26

## 2021-06-29 NOTE — PROGRESS NOTES
Progress Notes by Rebecca Koo CNP at 10/7/2020 10:10 AM     Author: Rebecca Koo CNP Service: -- Author Type: Nurse Practitioner    Filed: 10/7/2020 11:27 AM Encounter Date: 10/7/2020 Status: Signed    : Rebecca Koo CNP (Nurse Practitioner)       Chief Complaint   Patient presents with   ? Cough     was seen in August and was told pt may have acid reflux; symptoms returned at end of August; cough on and off--dry heaving; has been taking pepcid, which helps occassaionlly   ? Sore Throat     minor; started this morning        ASSESSMENT & PLAN:   Diagnoses and all orders for this visit:    Gastroesophageal reflux disease, unspecified whether esophagitis present    Sore throat  -     Rapid Strep A Screen-Throat  -     Symptomatic COVID-19 Virus (CORONAVIRUS) PCR  -     Group A Strep, RNA Direct Detection, Throat    Cough  -     Symptomatic COVID-19 Virus (CORONAVIRUS) PCR    Elevated blood pressure reading without diagnosis of hypertension        MDM:  Patient with mild cough which seems to be improving associated with dry heaving with forceful coughing.  Does have established diagnosis of GERD.  Is only taking Pepcid daily.  Should take twice daily, reduce meal size if necessary.    COVID testing as he does work at a [employer].  Quarantine until results are back.    Work to reduce or stop smoking discussed.    BP is very high today.  Does have some chest tightness today, but has been checked for that in the past with stress echo approximately 1 year ago.  This was normal.  Says he does get chest tightness associated with his GERD symptoms that go away with Pepcid.  Not associated with activity.    Discussed signs of htn of urgency or emergency.  Should come back or go to ER with the signs or symptoms.    Advised to make appointment for next week to be rechecked with primary care and to likely be started on blood pressure medication.    Supportive care discussed.  See discharge instructions below  "for specific recommendations given.    At the end of the encounter, I discussed results, diagnosis, medications. Discussed red flags for immediate return to clinic/ER, as well as indications for follow up if no improvement. Patient and/or caregiver understood and agreed to plan. Patient was stable for discharge.    SUBJECTIVE    HPI:  HPI  Brent Lau presents to the walk-in clinic with   Chief Complaint   Patient presents with   ? Cough     was seen in August and was told pt may have acid reflux; symptoms returned at end of August; cough on and off--dry heaving; has been taking pepcid, which helps occassaionlly   ? Sore Throat     minor; started this morning        Patient was seen in the walk-in clinic on July 30 after 1 week of cough with low-grade fever.  Recommended to try antacids if cough was persistent follow reflux precautions.  Chest x-ray at that time was negative.  COVID test was negative.    Felt better with Pepcid and Mucinex initially.  Then restarted Mucinex and Pepcid 2  Days ago.  Taking Pepcid 1x per day.  Is feeling better than on Tuesday.      Now feels like there's a little mucus left.        Smokes 1/2 PPD daily.    Patient vomited with coughing then started dry heaving with cough 2 days ago. Started after eating.      Works at [employer] as a .  Gets temp checked at work - no fevers.  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 Denies h/o same.  Did have cigarette PTA.      Is having some chest tightness and is hypertensive today.  Says not related to activity, is sometimes on the left side, sometimes on the right side, and seems to be better with Pepcid.  Stress echo done for left-sided chest pain last year was normal.    See ROS for additional symptoms and/or pertinent negatives.       History obtained from the patient.    Past Medical History:   Diagnosis Date   ? Acid reflux        Active Ambulatory " "(Non-Hospital) Problems    Diagnosis   ? Gastroesophageal reflux disease, unspecified whether esophagitis present   ? H/O echocardiogram       Family History   Problem Relation Age of Onset   ? Diabetes Mother    ? No Medical Problems Sister    ? No Medical Problems Brother        Social History     Tobacco Use   ? Smoking status: Current Every Day Smoker     Types: Cigarettes   ? Smokeless tobacco: Former User   Substance Use Topics   ? Alcohol use: Not on file       Review of Systems   Constitutional: Negative for chills and fever.   HENT: Positive for congestion and sore throat. Negative for ear pain.    Respiratory: Positive for cough and chest tightness (\"if I don't take Pepcid\" ).         Leg cramps    Neurological: Positive for dizziness (yesterday ).       OBJECTIVE    Vitals:    10/07/20 1022 10/07/20 1124   BP: (!) 159/114 (!) 174/104   Patient Site: Right Arm    Patient Position: Sitting    Cuff Size: Adult Regular    Pulse: 90    Resp: 16    Temp: 98.7  F (37.1  C)    TempSrc: Oral    SpO2: 98%        Physical Exam  Constitutional:       General: He is not in acute distress.     Appearance: He is well-developed.   HENT:      Right Ear: External ear normal.      Left Ear: External ear normal.      Mouth/Throat:      Pharynx: Posterior oropharyngeal erythema (Mild) present. No oropharyngeal exudate.   Eyes:      General:         Right eye: No discharge.         Left eye: No discharge.      Conjunctiva/sclera: Conjunctivae normal.   Cardiovascular:      Pulses: Normal pulses.   Pulmonary:      Effort: Pulmonary effort is normal.      Breath sounds: Normal breath sounds. No wheezing or rhonchi.   Musculoskeletal: Normal range of motion.   Skin:     General: Skin is warm and dry.      Capillary Refill: Capillary refill takes less than 2 seconds.   Neurological:      Mental Status: He is alert and oriented to person, place, and time.   Psychiatric:         Mood and Affect: Mood normal.         Behavior: " "Behavior normal.         Thought Content: Thought content normal.         Judgment: Judgment normal.         Labs:  Recent Results (from the past 240 hour(s))   Rapid Strep A Screen-Throat    Specimen: Throat   Result Value Ref Range    Rapid Strep A Antigen No Group A Strep detected, presumptive negative No Group A Strep detected, presumptive negative         Radiology:    No results found.    PATIENT INSTRUCTIONS:   Patient Instructions   Take your Pepcid acid reflux pill in the morning and evening (dinnertime) every day.    Work to reduce or stop smoking.      Recommend quarantine at home until your coronavirus test comes back - 2-3 days on average.  Look for the test results in your [employer] account.     If you are dry heaving after eating, eat smaller meals more frequently.    Strep test is negative - no strep.     Blood pressure is very high - please make appointment with \"Dr. MILLER" at the  for next week.  Go to ER with bad symptoms of high blood pressure in the handout.      Patient Education     High Blood Pressure, New, Begin Treatment  Your blood pressure was high enough today to start treatment with medicines. Often healthcare providers dont know what causes high blood pressure (hypertension). But it can be controlled with lifestyle changes and medicines. High blood pressure usually has no symptoms. But it can sometimes cause headache, dizziness, blurred vision, a rushing sound in your ears, chest pain, or shortness of breath. But even without symptoms, high blood pressure thats not treated raises your risk for heart attack, heart failure, and stroke. High blood pressure is a serious health risk and shouldnt be ignored.    Blood pressure measurements are given as 2 numbers. Systolic blood pressure is the upper number. This is the pressure when the heart contracts. Diastolic blood pressure is the lower number. This is the pressure when the heart relaxes between beats. You will see your blood pressure " readings written together. For example, a person with a systolic pressure of 118 and a diastolic pressure of 78 will have 118/78 written in the medical record.   Blood pressure is categorized as normal, elevated, or stage 1 or stage 2 high blood pressure:    Normal blood pressure is systolic of less than 120 and diastolic of less than 80 (120/80)    Elevated blood pressure is systolic of 120 to 129 and diastolic less than 80    Stage 1 high blood pressure is systolic is 130 to 139 or diastolic between 80 to 89    Stage 2 high blood pressure is when systolic is 140 or higher or the diastolic is 90 or higher  Home care  If you have high blood pressure, you should do what is listed below to lower your blood pressure. If you are taking medicines for high blood pressure, these methods may reduce or end your need for medicines in the future.    Begin a weight-loss program if you are overweight.    Cut back on how much salt you get in your diet. Heres how to do this:  ? Dont eat foods that have a lot of salt. These include olives, pickles, smoked meats, and salted potato chips.  ? Dont add salt to your food at the table.  ? Use only small amounts of salt when cooking.  ? Review food labels to track how much salt is in prepared foods.  ? When eating out, ask that no additional salt be added to your food order.    Begin an exercise program. Talk with your healthcare provider about the type of exercise program that would be best for you. It doesn't have to be hard. Even brisk walking for 20 minutes 3 times a week is a good form of exercise.    Dont take medicines that have heart stimulants. This includes many over-the-counter cold and sinus decongestant pills and sprays, as well as diet pills. Check the warnings about high blood pressure on the label. Before purchasing any over-the-counter medicines or supplements, always ask the pharmacist about the product's potential interaction with your high blood pressure and your high  blood pressure medicines.    Stimulants such as amphetamine or cocaine could be lethal for someone with high blood pressure. Never take these.    Limit how much caffeine you get in your diet. Switch to caffeine-free products.    Stop smoking. If you are a long-time smoker, this can be hard. Enroll in a stop-smoking program to make it more likely that you will quit for good.    Learn how to handle stress. This is an important part of any program to lower blood pressure. Learn about relaxation methods like meditation, yoga, or biofeedback.    If your provider prescribed medicines, take them exactly as directed. Missing doses may cause your blood pressure get out of control.    If you miss a dose or doses, check with your healthcare provider or pharmacist about what to do.    Consider buying an automatic blood pressure machine. Your provider can make a recommendation. You can get one of these at most pharmacies.  The American Heart Association recommends the following guidelines for home blood pressure monitoring:    Don't smoke or drink coffee or other caffeinated drinks for 30 minutes before taking your blood pressure.    Go to the bathroom before the test.    Relax for 5 minutes before taking the measurement.    Sit with your back supported (don't sit on a couch or soft chair); keep your feet on the floor uncrossed. Place your arm on a solid flat surface (like a table) with the upper part of the arm at heart level. Place the middle of the cuff directly above the bend of the elbow. Check the monitor's instruction manual for an illustration.    Take multiple readings. When you measure, take 2 to 3 readings one minute apart and record all of the results.    Take your blood pressure at the same time every day, or as your healthcare provider recommends.    Record the date, time, and blood pressure reading.    Take the record with you to your next medical appointment. If your blood pressure monitor has a built-in memory,  simply take the monitor with you to your next appointment.    Call your provider if you have several high readings. Don't be frightened by a single high blood pressure reading, but if you get several high readings, check in with your healthcare provider.    Note: When blood pressure reaches a systolic (top number) of 180 or higher OR diastolic (bottom number) of 110 or higher, seek emergency medical treatment.  Follow-up care  Because a new blood pressure medicine was started today, its important that you have your blood pressure rechecked. This is to make sure that the medicine is working and that you have no serious side effects. Keep all your follow up appointments. Write down medicine and blood pressure questions and bring them to your next appointment. If you have pressing concerns about your new medicine or your blood pressure, call your provider. Unless told otherwise, follow up with your healthcare provider or this facility within the next 3 days.  When to seek medical advice  Call your healthcare provider right away if any of these occur:    Blood pressure reaches a systolic (top number) of 180 or higher, OR diastolic (bottom number) of 110 or higher, seek emergency medical treatment.    Chest pain or shortness of breath    Severe headache    Throbbing or rushing sound in the ears    Nosebleed    Sudden severe pain in your belly (abdomen)    Extreme drowsiness, confusion, or fainting    Dizziness or dizziness with a spinning sensation (vertigo)    Weakness of an arm or leg or one side of the face    You have problems speaking or seeing   Date Last Reviewed: 12/1/2016 2000-2017 [employer]. [address] [place] [address], [place] [address]. All rights reserved. This information is not intended as a substitute for professional medical care. Always follow your healthcare professional's instructions.

## 2021-10-16 ENCOUNTER — HEALTH MAINTENANCE LETTER (OUTPATIENT)
Age: 41
End: 2021-10-16

## 2022-01-05 ENCOUNTER — HOSPITAL ENCOUNTER (INPATIENT)
Facility: CLINIC | Age: 42
LOS: 1 days | Discharge: ANOTHER HOSPITAL | DRG: 871 | End: 2022-01-05
Attending: EMERGENCY MEDICINE | Admitting: HOSPITALIST
Payer: COMMERCIAL

## 2022-01-05 ENCOUNTER — APPOINTMENT (OUTPATIENT)
Dept: RADIOLOGY | Facility: CLINIC | Age: 42
DRG: 871 | End: 2022-01-05
Attending: EMERGENCY MEDICINE
Payer: COMMERCIAL

## 2022-01-05 ENCOUNTER — APPOINTMENT (OUTPATIENT)
Dept: CT IMAGING | Facility: CLINIC | Age: 42
DRG: 871 | End: 2022-01-05
Attending: EMERGENCY MEDICINE
Payer: COMMERCIAL

## 2022-01-05 VITALS
SYSTOLIC BLOOD PRESSURE: 81 MMHG | HEART RATE: 133 BPM | TEMPERATURE: 101.3 F | WEIGHT: 165 LBS | OXYGEN SATURATION: 99 % | DIASTOLIC BLOOD PRESSURE: 52 MMHG | RESPIRATION RATE: 19 BRPM | BODY MASS INDEX: 22.69 KG/M2

## 2022-01-05 DIAGNOSIS — R06.82 TACHYPNEA: ICD-10-CM

## 2022-01-05 DIAGNOSIS — E87.29 HIGH ANION GAP METABOLIC ACIDOSIS: ICD-10-CM

## 2022-01-05 DIAGNOSIS — J96.01 ACUTE RESPIRATORY FAILURE WITH HYPOXIA (H): ICD-10-CM

## 2022-01-05 DIAGNOSIS — E87.20 LACTIC ACIDOSIS: ICD-10-CM

## 2022-01-05 DIAGNOSIS — J18.9 PNEUMONIA OF BOTH LOWER LOBES DUE TO INFECTIOUS ORGANISM: ICD-10-CM

## 2022-01-05 DIAGNOSIS — R00.0 SINUS TACHYCARDIA: ICD-10-CM

## 2022-01-05 LAB
ALBUMIN SERPL-MCNC: 2.3 G/DL (ref 3.5–5)
ALBUMIN UR-MCNC: 600 MG/DL
ALP SERPL-CCNC: 56 U/L (ref 45–120)
ALT SERPL W P-5'-P-CCNC: 43 U/L (ref 0–45)
ANION GAP SERPL CALCULATED.3IONS-SCNC: 29 MMOL/L (ref 5–18)
APPEARANCE UR: CLEAR
APTT PPP: 37 SECONDS (ref 22–38)
AST SERPL W P-5'-P-CCNC: 119 U/L (ref 0–40)
BACTERIA #/AREA URNS HPF: ABNORMAL /HPF
BASE EXCESS BLDA CALC-SCNC: -10.1 MMOL/L
BASE EXCESS BLDA CALC-SCNC: -11.3 MMOL/L
BASE EXCESS BLDV CALC-SCNC: -10.2 MMOL/L
BASOPHILS # BLD MANUAL: 0 10E3/UL (ref 0–0.2)
BASOPHILS # BLD MANUAL: 0 10E3/UL (ref 0–0.2)
BASOPHILS NFR BLD MANUAL: 0 %
BASOPHILS NFR BLD MANUAL: 0 %
BILIRUB DIRECT SERPL-MCNC: 0.8 MG/DL
BILIRUB SERPL-MCNC: 1.2 MG/DL (ref 0–1)
BILIRUB UR QL STRIP: ABNORMAL
BNP SERPL-MCNC: 35 PG/ML (ref 0–35)
BUN SERPL-MCNC: 12 MG/DL (ref 8–22)
C REACTIVE PROTEIN LHE: 39.4 MG/DL (ref 0–0.8)
CALCIUM SERPL-MCNC: 8.6 MG/DL (ref 8.5–10.5)
CHLORIDE BLD-SCNC: 94 MMOL/L (ref 98–107)
CO2 SERPL-SCNC: 15 MMOL/L (ref 22–31)
COHGB MFR BLD: 91.2 % (ref 96–97)
COHGB MFR BLD: 94.2 % (ref 96–97)
COLOR UR AUTO: ABNORMAL
CREAT SERPL-MCNC: 0.93 MG/DL (ref 0.7–1.3)
D DIMER PPP FEU-MCNC: 15.87 UG/ML FEU (ref 0–0.5)
EOSINOPHIL # BLD MANUAL: 0 10E3/UL (ref 0–0.7)
EOSINOPHIL # BLD MANUAL: 0 10E3/UL (ref 0–0.7)
EOSINOPHIL NFR BLD MANUAL: 0 %
EOSINOPHIL NFR BLD MANUAL: 0 %
ERYTHROCYTE [DISTWIDTH] IN BLOOD BY AUTOMATED COUNT: 13.2 % (ref 10–15)
ERYTHROCYTE [DISTWIDTH] IN BLOOD BY AUTOMATED COUNT: 13.3 % (ref 10–15)
ETHANOL SERPL-MCNC: 141 MG/DL
FIBRINOGEN PPP-MCNC: 767 MG/DL (ref 170–490)
FLOW: 60 LPM
FLUAV RNA SPEC QL NAA+PROBE: NEGATIVE
FLUBV RNA RESP QL NAA+PROBE: NEGATIVE
GFR SERPL CREATININE-BSD FRML MDRD: >90 ML/MIN/1.73M2
GLUCOSE BLD-MCNC: 93 MG/DL (ref 70–125)
GLUCOSE UR STRIP-MCNC: 30 MG/DL
HCO3 BLD-SCNC: 16 MMOL/L (ref 23–29)
HCO3 BLD-SCNC: 18 MMOL/L (ref 23–29)
HCO3 BLDV-SCNC: 17 MMOL/L (ref 24–30)
HCT VFR BLD AUTO: 35.7 % (ref 40–53)
HCT VFR BLD AUTO: 39.5 % (ref 40–53)
HGB BLD-MCNC: 11.9 G/DL (ref 13.3–17.7)
HGB BLD-MCNC: 13.3 G/DL (ref 13.3–17.7)
HGB UR QL STRIP: ABNORMAL
HOLD SPECIMEN: NORMAL
HOLD SPECIMEN: NORMAL
HYALINE CASTS: 5 /LPF
INR PPP: 1.08 (ref 0.85–1.15)
KETONES UR STRIP-MCNC: 60 MG/DL
LACTATE SERPL-SCNC: 10.5 MMOL/L (ref 0.7–2)
LACTATE SERPL-SCNC: 7.2 MMOL/L (ref 0.7–2)
LACTATE SERPL-SCNC: 9.2 MMOL/L (ref 0.7–2)
LEUKOCYTE ESTERASE UR QL STRIP: NEGATIVE
LYMPHOCYTES # BLD MANUAL: 0.4 10E3/UL (ref 0.8–5.3)
LYMPHOCYTES # BLD MANUAL: 1 10E3/UL (ref 0.8–5.3)
LYMPHOCYTES NFR BLD MANUAL: 16 %
LYMPHOCYTES NFR BLD MANUAL: 18 %
MAGNESIUM SERPL-MCNC: 1.3 MG/DL (ref 1.8–2.6)
MAGNESIUM SERPL-MCNC: 1.3 MG/DL (ref 1.8–2.6)
MCH RBC QN AUTO: 31.9 PG (ref 26.5–33)
MCH RBC QN AUTO: 32 PG (ref 26.5–33)
MCHC RBC AUTO-ENTMCNC: 33.3 G/DL (ref 31.5–36.5)
MCHC RBC AUTO-ENTMCNC: 33.7 G/DL (ref 31.5–36.5)
MCV RBC AUTO: 95 FL (ref 78–100)
MCV RBC AUTO: 96 FL (ref 78–100)
METAMYELOCYTES # BLD MANUAL: 0.2 10E3/UL
METAMYELOCYTES # BLD MANUAL: 0.2 10E3/UL
METAMYELOCYTES NFR BLD MANUAL: 3 %
METAMYELOCYTES NFR BLD MANUAL: 9 %
MONOCYTES # BLD MANUAL: 0 10E3/UL (ref 0–1.3)
MONOCYTES # BLD MANUAL: 0.1 10E3/UL (ref 0–1.3)
MONOCYTES NFR BLD MANUAL: 1 %
MONOCYTES NFR BLD MANUAL: 2 %
MUCOUS THREADS #/AREA URNS LPF: PRESENT /LPF
MYELOCYTES # BLD MANUAL: 0 10E3/UL
MYELOCYTES NFR BLD MANUAL: 2 %
NEUTROPHILS # BLD MANUAL: 1.7 10E3/UL (ref 1.6–8.3)
NEUTROPHILS # BLD MANUAL: 5 10E3/UL (ref 1.6–8.3)
NEUTROPHILS NFR BLD MANUAL: 70 %
NEUTROPHILS NFR BLD MANUAL: 79 %
NITRATE UR QL: NEGATIVE
O2/TOTAL GAS SETTING VFR VENT: 0.85 %
O2/TOTAL GAS SETTING VFR VENT: 80 %
OXYHGB MFR BLD: 89.6 % (ref 96–97)
OXYHGB MFR BLD: 93 % (ref 96–97)
OXYHGB MFR BLDV: 81.3 % (ref 70–75)
PCO2 BLD: 26 MM HG (ref 35–45)
PCO2 BLD: 33 MM HG (ref 35–45)
PCO2 BLDV: 26 MM HG (ref 35–50)
PEEP: 6 CM H2O
PH BLD: 7.28 [PH] (ref 7.37–7.44)
PH BLD: 7.38 [PH] (ref 7.37–7.44)
PH BLDV: 7.37 [PH] (ref 7.35–7.45)
PH UR STRIP: 6 [PH] (ref 5–7)
PHOSPHATE SERPL-MCNC: 2.6 MG/DL (ref 2.5–4.5)
PLAT MORPH BLD: ABNORMAL
PLAT MORPH BLD: ABNORMAL
PLATELET # BLD AUTO: 54 10E3/UL (ref 150–450)
PLATELET # BLD AUTO: 69 10E3/UL (ref 150–450)
PO2 BLD: 69 MM HG (ref 80–90)
PO2 BLD: 89 MM HG (ref 80–90)
PO2 BLDV: 59 MM HG (ref 25–47)
POTASSIUM BLD-SCNC: 3.3 MMOL/L (ref 3.5–5)
POTASSIUM BLD-SCNC: 3.4 MMOL/L (ref 3.5–5)
POTASSIUM BLD-SCNC: 3.4 MMOL/L (ref 3.5–5)
PROCALCITONIN SERPL-MCNC: 16.4 NG/ML (ref 0–0.49)
PROT SERPL-MCNC: 6.1 G/DL (ref 6–8)
PSV (LAB BLOOD GAS): 8 CM H2O
RBC # BLD AUTO: 3.73 10E6/UL (ref 4.4–5.9)
RBC # BLD AUTO: 4.16 10E6/UL (ref 4.4–5.9)
RBC MORPH BLD: ABNORMAL
RBC MORPH BLD: ABNORMAL
RBC URINE: 2 /HPF
RETICS # AUTO: 0.04 10E6/UL (ref 0.01–0.11)
RETICS/RBC NFR AUTO: 1.1 % (ref 0.8–2.7)
SAO2 % BLDV: 83 % (ref 70–75)
SARS-COV-2 RNA RESP QL NAA+PROBE: NEGATIVE
SODIUM SERPL-SCNC: 138 MMOL/L (ref 136–145)
SP GR UR STRIP: 1.04 (ref 1–1.03)
SQUAMOUS EPITHELIAL: <1 /HPF
TEMPERATURE: 37 DEGREES C
TEMPERATURE: 37 DEGREES C
TROPONIN I SERPL-MCNC: 0.02 NG/ML (ref 0–0.29)
TSH SERPL DL<=0.005 MIU/L-ACNC: 0.4 UIU/ML (ref 0.3–5)
UROBILINOGEN UR STRIP-MCNC: 6 MG/DL
VARIANT LYMPHS BLD QL SMEAR: PRESENT
VENTILATION MODE: ABNORMAL
VENTILATION MODE: ABNORMAL
WBC # BLD AUTO: 2.4 10E3/UL (ref 4–11)
WBC # BLD AUTO: 6.3 10E3/UL (ref 4–11)
WBC URINE: 3 /HPF

## 2022-01-05 PROCEDURE — 5A1935Z RESPIRATORY VENTILATION, LESS THAN 24 CONSECUTIVE HOURS: ICD-10-PCS | Performed by: HOSPITALIST

## 2022-01-05 PROCEDURE — 96365 THER/PROPH/DIAG IV INF INIT: CPT | Mod: 59

## 2022-01-05 PROCEDURE — 99291 CRITICAL CARE FIRST HOUR: CPT | Mod: GC | Performed by: SURGERY

## 2022-01-05 PROCEDURE — 93005 ELECTROCARDIOGRAM TRACING: CPT | Performed by: EMERGENCY MEDICINE

## 2022-01-05 PROCEDURE — 272N000452 HC KIT SHRLOCK 5FR POWER PICC TRIPLE LUMEN

## 2022-01-05 PROCEDURE — 86698 HISTOPLASMA ANTIBODY: CPT | Performed by: EMERGENCY MEDICINE

## 2022-01-05 PROCEDURE — 83735 ASSAY OF MAGNESIUM: CPT | Performed by: HOSPITALIST

## 2022-01-05 PROCEDURE — 250N000011 HC RX IP 250 OP 636: Performed by: EMERGENCY MEDICINE

## 2022-01-05 PROCEDURE — 99291 CRITICAL CARE FIRST HOUR: CPT | Mod: 25,CS

## 2022-01-05 PROCEDURE — 96366 THER/PROPH/DIAG IV INF ADDON: CPT

## 2022-01-05 PROCEDURE — 94002 VENT MGMT INPAT INIT DAY: CPT

## 2022-01-05 PROCEDURE — 250N000009 HC RX 250: Performed by: EMERGENCY MEDICINE

## 2022-01-05 PROCEDURE — 82077 ASSAY SPEC XCP UR&BREATH IA: CPT | Performed by: HOSPITALIST

## 2022-01-05 PROCEDURE — 87449 NOS EACH ORGANISM AG IA: CPT | Performed by: EMERGENCY MEDICINE

## 2022-01-05 PROCEDURE — 96368 THER/DIAG CONCURRENT INF: CPT

## 2022-01-05 PROCEDURE — 84443 ASSAY THYROID STIM HORMONE: CPT | Performed by: EMERGENCY MEDICINE

## 2022-01-05 PROCEDURE — 85379 FIBRIN DEGRADATION QUANT: CPT | Performed by: HOSPITALIST

## 2022-01-05 PROCEDURE — 82805 BLOOD GASES W/O2 SATURATION: CPT | Performed by: EMERGENCY MEDICINE

## 2022-01-05 PROCEDURE — 84484 ASSAY OF TROPONIN QUANT: CPT | Performed by: EMERGENCY MEDICINE

## 2022-01-05 PROCEDURE — 999N000065 XR CHEST PORT 1 VIEW

## 2022-01-05 PROCEDURE — C9113 INJ PANTOPRAZOLE SODIUM, VIA: HCPCS | Performed by: HOSPITALIST

## 2022-01-05 PROCEDURE — 85045 AUTOMATED RETICULOCYTE COUNT: CPT | Performed by: HOSPITALIST

## 2022-01-05 PROCEDURE — 99223 1ST HOSP IP/OBS HIGH 75: CPT | Performed by: HOSPITALIST

## 2022-01-05 PROCEDURE — 84132 ASSAY OF SERUM POTASSIUM: CPT | Performed by: FAMILY MEDICINE

## 2022-01-05 PROCEDURE — 81001 URINALYSIS AUTO W/SCOPE: CPT | Performed by: HOSPITALIST

## 2022-01-05 PROCEDURE — 86140 C-REACTIVE PROTEIN: CPT | Performed by: EMERGENCY MEDICINE

## 2022-01-05 PROCEDURE — 94660 CPAP INITIATION&MGMT: CPT

## 2022-01-05 PROCEDURE — 99152 MOD SED SAME PHYS/QHP 5/>YRS: CPT

## 2022-01-05 PROCEDURE — 83880 ASSAY OF NATRIURETIC PEPTIDE: CPT | Performed by: EMERGENCY MEDICINE

## 2022-01-05 PROCEDURE — 85730 THROMBOPLASTIN TIME PARTIAL: CPT | Performed by: EMERGENCY MEDICINE

## 2022-01-05 PROCEDURE — 250N000013 HC RX MED GY IP 250 OP 250 PS 637: Performed by: HOSPITALIST

## 2022-01-05 PROCEDURE — 36569 INSJ PICC 5 YR+ W/O IMAGING: CPT

## 2022-01-05 PROCEDURE — 85610 PROTHROMBIN TIME: CPT | Performed by: EMERGENCY MEDICINE

## 2022-01-05 PROCEDURE — 258N000003 HC RX IP 258 OP 636: Performed by: HOSPITALIST

## 2022-01-05 PROCEDURE — C9803 HOPD COVID-19 SPEC COLLECT: HCPCS

## 2022-01-05 PROCEDURE — 82805 BLOOD GASES W/O2 SATURATION: CPT | Performed by: FAMILY MEDICINE

## 2022-01-05 PROCEDURE — 87899 AGENT NOS ASSAY W/OPTIC: CPT | Performed by: EMERGENCY MEDICINE

## 2022-01-05 PROCEDURE — 36415 COLL VENOUS BLD VENIPUNCTURE: CPT | Performed by: EMERGENCY MEDICINE

## 2022-01-05 PROCEDURE — 87636 SARSCOV2 & INF A&B AMP PRB: CPT | Performed by: EMERGENCY MEDICINE

## 2022-01-05 PROCEDURE — 85027 COMPLETE CBC AUTOMATED: CPT | Performed by: EMERGENCY MEDICINE

## 2022-01-05 PROCEDURE — 36415 COLL VENOUS BLD VENIPUNCTURE: CPT | Performed by: HOSPITALIST

## 2022-01-05 PROCEDURE — 258N000003 HC RX IP 258 OP 636: Performed by: EMERGENCY MEDICINE

## 2022-01-05 PROCEDURE — 36600 WITHDRAWAL OF ARTERIAL BLOOD: CPT

## 2022-01-05 PROCEDURE — 87077 CULTURE AEROBIC IDENTIFY: CPT | Performed by: EMERGENCY MEDICINE

## 2022-01-05 PROCEDURE — 87149 DNA/RNA DIRECT PROBE: CPT | Performed by: EMERGENCY MEDICINE

## 2022-01-05 PROCEDURE — 999N000157 HC STATISTIC RCP TIME EA 10 MIN

## 2022-01-05 PROCEDURE — 96361 HYDRATE IV INFUSION ADD-ON: CPT

## 2022-01-05 PROCEDURE — 71275 CT ANGIOGRAPHY CHEST: CPT

## 2022-01-05 PROCEDURE — 120N000001 HC R&B MED SURG/OB

## 2022-01-05 PROCEDURE — 84145 PROCALCITONIN (PCT): CPT | Performed by: EMERGENCY MEDICINE

## 2022-01-05 PROCEDURE — 999N000206 HC STATISTICAL VASC ACCESS NURSE TIME, 61+ MINUTES

## 2022-01-05 PROCEDURE — 85384 FIBRINOGEN ACTIVITY: CPT | Performed by: HOSPITALIST

## 2022-01-05 PROCEDURE — 96375 TX/PRO/DX INJ NEW DRUG ADDON: CPT

## 2022-01-05 PROCEDURE — 83605 ASSAY OF LACTIC ACID: CPT | Performed by: EMERGENCY MEDICINE

## 2022-01-05 PROCEDURE — 99292 CRITICAL CARE ADDL 30 MIN: CPT | Mod: CS

## 2022-01-05 PROCEDURE — 250N000011 HC RX IP 250 OP 636: Performed by: HOSPITALIST

## 2022-01-05 PROCEDURE — 31500 INSERT EMERGENCY AIRWAY: CPT

## 2022-01-05 PROCEDURE — 36415 COLL VENOUS BLD VENIPUNCTURE: CPT | Performed by: FAMILY MEDICINE

## 2022-01-05 PROCEDURE — 86612 BLASTOMYCES ANTIBODY: CPT | Performed by: EMERGENCY MEDICINE

## 2022-01-05 PROCEDURE — 84100 ASSAY OF PHOSPHORUS: CPT | Performed by: HOSPITALIST

## 2022-01-05 PROCEDURE — 84132 ASSAY OF SERUM POTASSIUM: CPT | Performed by: HOSPITALIST

## 2022-01-05 PROCEDURE — 80076 HEPATIC FUNCTION PANEL: CPT | Performed by: HOSPITALIST

## 2022-01-05 PROCEDURE — 83605 ASSAY OF LACTIC ACID: CPT | Performed by: HOSPITALIST

## 2022-01-05 PROCEDURE — 83605 ASSAY OF LACTIC ACID: CPT | Performed by: FAMILY MEDICINE

## 2022-01-05 PROCEDURE — 96367 TX/PROPH/DG ADDL SEQ IV INF: CPT

## 2022-01-05 PROCEDURE — 250N000011 HC RX IP 250 OP 636: Performed by: FAMILY MEDICINE

## 2022-01-05 PROCEDURE — 80048 BASIC METABOLIC PNL TOTAL CA: CPT | Performed by: EMERGENCY MEDICINE

## 2022-01-05 PROCEDURE — 85027 COMPLETE CBC AUTOMATED: CPT | Performed by: HOSPITALIST

## 2022-01-05 RX ORDER — PIPERACILLIN SODIUM, TAZOBACTAM SODIUM 3; .375 G/15ML; G/15ML
3.38 INJECTION, POWDER, LYOPHILIZED, FOR SOLUTION INTRAVENOUS EVERY 8 HOURS
Status: DISCONTINUED | OUTPATIENT
Start: 2022-01-05 | End: 2022-01-06 | Stop reason: HOSPADM

## 2022-01-05 RX ORDER — MULTIPLE VITAMINS W/ MINERALS TAB 9MG-400MCG
1 TAB ORAL DAILY
Status: DISCONTINUED | OUTPATIENT
Start: 2022-01-05 | End: 2022-01-06 | Stop reason: HOSPADM

## 2022-01-05 RX ORDER — DOCUSATE SODIUM 100 MG/1
100 CAPSULE, LIQUID FILLED ORAL 2 TIMES DAILY PRN
Status: DISCONTINUED | OUTPATIENT
Start: 2022-01-05 | End: 2022-01-06 | Stop reason: HOSPADM

## 2022-01-05 RX ORDER — PROCHLORPERAZINE MALEATE 10 MG
10 TABLET ORAL EVERY 6 HOURS PRN
Status: CANCELLED | OUTPATIENT
Start: 2022-01-05

## 2022-01-05 RX ORDER — ONDANSETRON 4 MG/1
4 TABLET, ORALLY DISINTEGRATING ORAL EVERY 6 HOURS PRN
Status: DISCONTINUED | OUTPATIENT
Start: 2022-01-05 | End: 2022-01-06 | Stop reason: HOSPADM

## 2022-01-05 RX ORDER — SODIUM CHLORIDE 9 MG/ML
INJECTION, SOLUTION INTRAVENOUS CONTINUOUS
Status: DISCONTINUED | OUTPATIENT
Start: 2022-01-05 | End: 2022-01-05

## 2022-01-05 RX ORDER — IOPAMIDOL 755 MG/ML
100 INJECTION, SOLUTION INTRAVASCULAR ONCE
Status: COMPLETED | OUTPATIENT
Start: 2022-01-05 | End: 2022-01-05

## 2022-01-05 RX ORDER — ETOMIDATE 2 MG/ML
30 INJECTION INTRAVENOUS ONCE
Status: COMPLETED | OUTPATIENT
Start: 2022-01-05 | End: 2022-01-05

## 2022-01-05 RX ORDER — LIDOCAINE 40 MG/G
CREAM TOPICAL
Status: DISCONTINUED | OUTPATIENT
Start: 2022-01-05 | End: 2022-01-06 | Stop reason: HOSPADM

## 2022-01-05 RX ORDER — ONDANSETRON 2 MG/ML
4 INJECTION INTRAMUSCULAR; INTRAVENOUS EVERY 6 HOURS PRN
Status: DISCONTINUED | OUTPATIENT
Start: 2022-01-05 | End: 2022-01-06 | Stop reason: HOSPADM

## 2022-01-05 RX ORDER — NOREPINEPHRINE BITARTRATE 0.02 MG/ML
.01-.6 INJECTION, SOLUTION INTRAVENOUS CONTINUOUS
Status: DISCONTINUED | OUTPATIENT
Start: 2022-01-05 | End: 2022-01-06 | Stop reason: HOSPADM

## 2022-01-05 RX ORDER — FOLIC ACID 1 MG/1
1 TABLET ORAL DAILY
Status: CANCELLED | OUTPATIENT
Start: 2022-01-06

## 2022-01-05 RX ORDER — PROCHLORPERAZINE 25 MG
25 SUPPOSITORY, RECTAL RECTAL EVERY 12 HOURS PRN
Status: DISCONTINUED | OUTPATIENT
Start: 2022-01-05 | End: 2022-01-06 | Stop reason: HOSPADM

## 2022-01-05 RX ORDER — LIDOCAINE 40 MG/G
CREAM TOPICAL
Status: CANCELLED | OUTPATIENT
Start: 2022-01-05

## 2022-01-05 RX ORDER — PIPERACILLIN SODIUM, TAZOBACTAM SODIUM 3; .375 G/15ML; G/15ML
3.38 INJECTION, POWDER, LYOPHILIZED, FOR SOLUTION INTRAVENOUS EVERY 8 HOURS
Status: CANCELLED | OUTPATIENT
Start: 2022-01-05

## 2022-01-05 RX ORDER — FENTANYL CITRATE 50 UG/ML
100 INJECTION, SOLUTION INTRAMUSCULAR; INTRAVENOUS ONCE
Status: COMPLETED | OUTPATIENT
Start: 2022-01-05 | End: 2022-01-05

## 2022-01-05 RX ORDER — MULTIPLE VITAMINS W/ MINERALS TAB 9MG-400MCG
1 TAB ORAL DAILY
Status: CANCELLED | OUTPATIENT
Start: 2022-01-06

## 2022-01-05 RX ORDER — PROPOFOL 10 MG/ML
5-75 INJECTION, EMULSION INTRAVENOUS CONTINUOUS
Status: DISCONTINUED | OUTPATIENT
Start: 2022-01-05 | End: 2022-01-06 | Stop reason: HOSPADM

## 2022-01-05 RX ORDER — MAGNESIUM SULFATE 4 G/50ML
4 INJECTION INTRAVENOUS ONCE
Status: COMPLETED | OUTPATIENT
Start: 2022-01-05 | End: 2022-01-05

## 2022-01-05 RX ORDER — LIDOCAINE 40 MG/G
CREAM TOPICAL
Status: CANCELLED | OUTPATIENT
Start: 2022-01-05 | End: 2022-01-08

## 2022-01-05 RX ORDER — ACETAMINOPHEN 325 MG/1
975 TABLET ORAL EVERY 8 HOURS PRN
Status: CANCELLED | OUTPATIENT
Start: 2022-01-05

## 2022-01-05 RX ORDER — FOLIC ACID 1 MG/1
1 TABLET ORAL DAILY
Status: DISCONTINUED | OUTPATIENT
Start: 2022-01-05 | End: 2022-01-06 | Stop reason: HOSPADM

## 2022-01-05 RX ORDER — HALOPERIDOL 5 MG/ML
2.5-5 INJECTION INTRAMUSCULAR EVERY 6 HOURS PRN
Status: CANCELLED | OUTPATIENT
Start: 2022-01-05

## 2022-01-05 RX ORDER — ACETAMINOPHEN 325 MG/1
975 TABLET ORAL EVERY 8 HOURS PRN
Status: DISCONTINUED | OUTPATIENT
Start: 2022-01-05 | End: 2022-01-06 | Stop reason: HOSPADM

## 2022-01-05 RX ORDER — HALOPERIDOL 5 MG/ML
2.5-5 INJECTION INTRAMUSCULAR EVERY 6 HOURS PRN
Status: DISCONTINUED | OUTPATIENT
Start: 2022-01-05 | End: 2022-01-06 | Stop reason: HOSPADM

## 2022-01-05 RX ORDER — NICOTINE 21 MG/24HR
1 PATCH, TRANSDERMAL 24 HOURS TRANSDERMAL DAILY
Status: DISCONTINUED | OUTPATIENT
Start: 2022-01-05 | End: 2022-01-06 | Stop reason: HOSPADM

## 2022-01-05 RX ORDER — DIAZEPAM 5 MG
10 TABLET ORAL EVERY 30 MIN PRN
Status: CANCELLED | OUTPATIENT
Start: 2022-01-05

## 2022-01-05 RX ORDER — SODIUM CHLORIDE 9 MG/ML
INJECTION, SOLUTION INTRAVENOUS CONTINUOUS
Status: CANCELLED | OUTPATIENT
Start: 2022-01-05

## 2022-01-05 RX ORDER — FLUMAZENIL 0.1 MG/ML
0.2 INJECTION, SOLUTION INTRAVENOUS
Status: CANCELLED | OUTPATIENT
Start: 2022-01-05

## 2022-01-05 RX ORDER — OLANZAPINE 5 MG/1
5-10 TABLET, ORALLY DISINTEGRATING ORAL EVERY 6 HOURS PRN
Status: DISCONTINUED | OUTPATIENT
Start: 2022-01-05 | End: 2022-01-06 | Stop reason: HOSPADM

## 2022-01-05 RX ORDER — OLANZAPINE 5 MG/1
5-10 TABLET, ORALLY DISINTEGRATING ORAL EVERY 6 HOURS PRN
Status: CANCELLED | OUTPATIENT
Start: 2022-01-05

## 2022-01-05 RX ORDER — LORAZEPAM 2 MG/ML
2 INJECTION INTRAMUSCULAR ONCE
Status: COMPLETED | OUTPATIENT
Start: 2022-01-05 | End: 2022-01-05

## 2022-01-05 RX ORDER — PROCHLORPERAZINE 25 MG
25 SUPPOSITORY, RECTAL RECTAL EVERY 12 HOURS PRN
Status: CANCELLED | OUTPATIENT
Start: 2022-01-05

## 2022-01-05 RX ORDER — DOCUSATE SODIUM 100 MG/1
100 CAPSULE, LIQUID FILLED ORAL 2 TIMES DAILY PRN
Status: CANCELLED | OUTPATIENT
Start: 2022-01-05

## 2022-01-05 RX ORDER — DIAZEPAM 5 MG
10 TABLET ORAL EVERY 30 MIN PRN
Status: DISCONTINUED | OUTPATIENT
Start: 2022-01-05 | End: 2022-01-06 | Stop reason: HOSPADM

## 2022-01-05 RX ORDER — DIAZEPAM 10 MG/2ML
5-10 INJECTION, SOLUTION INTRAMUSCULAR; INTRAVENOUS EVERY 30 MIN PRN
Status: DISCONTINUED | OUTPATIENT
Start: 2022-01-05 | End: 2022-01-06 | Stop reason: HOSPADM

## 2022-01-05 RX ORDER — PROCHLORPERAZINE MALEATE 10 MG
10 TABLET ORAL EVERY 6 HOURS PRN
Status: DISCONTINUED | OUTPATIENT
Start: 2022-01-05 | End: 2022-01-06 | Stop reason: HOSPADM

## 2022-01-05 RX ORDER — ONDANSETRON 2 MG/ML
4 INJECTION INTRAMUSCULAR; INTRAVENOUS EVERY 6 HOURS PRN
Status: CANCELLED | OUTPATIENT
Start: 2022-01-05

## 2022-01-05 RX ORDER — ONDANSETRON 4 MG/1
4 TABLET, ORALLY DISINTEGRATING ORAL EVERY 6 HOURS PRN
Status: CANCELLED | OUTPATIENT
Start: 2022-01-05

## 2022-01-05 RX ORDER — DIAZEPAM 10 MG/2ML
5-10 INJECTION, SOLUTION INTRAMUSCULAR; INTRAVENOUS EVERY 30 MIN PRN
Status: CANCELLED | OUTPATIENT
Start: 2022-01-05

## 2022-01-05 RX ORDER — NICOTINE 21 MG/24HR
1 PATCH, TRANSDERMAL 24 HOURS TRANSDERMAL DAILY
Status: CANCELLED | OUTPATIENT
Start: 2022-01-06

## 2022-01-05 RX ORDER — PIPERACILLIN SODIUM, TAZOBACTAM SODIUM 3; .375 G/15ML; G/15ML
3.38 INJECTION, POWDER, LYOPHILIZED, FOR SOLUTION INTRAVENOUS ONCE
Status: COMPLETED | OUTPATIENT
Start: 2022-01-05 | End: 2022-01-05

## 2022-01-05 RX ORDER — FLUMAZENIL 0.1 MG/ML
0.2 INJECTION, SOLUTION INTRAVENOUS
Status: DISCONTINUED | OUTPATIENT
Start: 2022-01-05 | End: 2022-01-06 | Stop reason: HOSPADM

## 2022-01-05 RX ORDER — SODIUM CHLORIDE AND POTASSIUM CHLORIDE 150; 900 MG/100ML; MG/100ML
INJECTION, SOLUTION INTRAVENOUS CONTINUOUS
Status: DISCONTINUED | OUTPATIENT
Start: 2022-01-05 | End: 2022-01-06 | Stop reason: HOSPADM

## 2022-01-05 RX ADMIN — PIPERACILLIN AND TAZOBACTAM 3.38 G: 3; .375 INJECTION, POWDER, LYOPHILIZED, FOR SOLUTION INTRAVENOUS at 22:59

## 2022-01-05 RX ADMIN — Medication 100 MG: at 22:00

## 2022-01-05 RX ADMIN — SODIUM CHLORIDE 1000 ML: 9 INJECTION, SOLUTION INTRAVENOUS at 15:23

## 2022-01-05 RX ADMIN — Medication 0.03 MCG/KG/MIN: at 23:14

## 2022-01-05 RX ADMIN — SODIUM CHLORIDE 1000 ML: 9 INJECTION, SOLUTION INTRAVENOUS at 16:18

## 2022-01-05 RX ADMIN — VANCOMYCIN HYDROCHLORIDE 1500 MG: 5 INJECTION, POWDER, LYOPHILIZED, FOR SOLUTION INTRAVENOUS at 16:03

## 2022-01-05 RX ADMIN — ACETAMINOPHEN 975 MG: 325 TABLET ORAL at 20:48

## 2022-01-05 RX ADMIN — IOPAMIDOL 62 ML: 755 INJECTION, SOLUTION INTRAVENOUS at 15:10

## 2022-01-05 RX ADMIN — PANTOPRAZOLE SODIUM 40 MG: 40 INJECTION, POWDER, FOR SOLUTION INTRAVENOUS at 17:40

## 2022-01-05 RX ADMIN — SODIUM CHLORIDE 250 ML: 9 INJECTION, SOLUTION INTRAVENOUS at 17:07

## 2022-01-05 RX ADMIN — MAGNESIUM SULFATE HEPTAHYDRATE 4 G: 80 INJECTION, SOLUTION INTRAVENOUS at 21:52

## 2022-01-05 RX ADMIN — POTASSIUM CHLORIDE AND SODIUM CHLORIDE: 900; 150 INJECTION, SOLUTION INTRAVENOUS at 21:53

## 2022-01-05 RX ADMIN — PIPERACILLIN AND TAZOBACTAM 3.38 G: 3; .375 INJECTION, POWDER, LYOPHILIZED, FOR SOLUTION INTRAVENOUS at 15:33

## 2022-01-05 RX ADMIN — MULTIPLE VITAMINS W/ MINERALS TAB 1 TABLET: TAB at 17:44

## 2022-01-05 RX ADMIN — PROPOFOL 20 MCG/KG/MIN: 10 INJECTION, EMULSION INTRAVENOUS at 22:09

## 2022-01-05 RX ADMIN — LORAZEPAM 2 MG: 2 INJECTION INTRAMUSCULAR; INTRAVENOUS at 23:01

## 2022-01-05 RX ADMIN — FENTANYL CITRATE 100 MCG: 50 INJECTION INTRAMUSCULAR; INTRAVENOUS at 22:18

## 2022-01-05 RX ADMIN — ETOMIDATE 30 MG: 20 INJECTION, SOLUTION INTRAVENOUS at 21:55

## 2022-01-05 RX ADMIN — FENTANYL CITRATE 100 MCG: 50 INJECTION INTRAMUSCULAR; INTRAVENOUS at 22:48

## 2022-01-05 RX ADMIN — NICOTINE 1 PATCH: 21 PATCH, EXTENDED RELEASE TRANSDERMAL at 17:41

## 2022-01-05 RX ADMIN — SODIUM CHLORIDE: 9 INJECTION, SOLUTION INTRAVENOUS at 17:39

## 2022-01-05 RX ADMIN — FOLIC ACID 1 MG: 1 TABLET ORAL at 17:43

## 2022-01-05 RX ADMIN — Medication 100 MG: at 17:43

## 2022-01-05 ASSESSMENT — ACTIVITIES OF DAILY LIVING (ADL)
ADLS_ACUITY_SCORE: 7
ADLS_ACUITY_SCORE: 8
ADLS_ACUITY_SCORE: 7
ADLS_ACUITY_SCORE: 8
ADLS_ACUITY_SCORE: 7
ADLS_ACUITY_SCORE: 8
DEPENDENT_IADLS:: TRANSPORTATION
ADLS_ACUITY_SCORE: 7
ADLS_ACUITY_SCORE: 7

## 2022-01-05 NOTE — PHARMACY-VANCOMYCIN DOSING SERVICE
Pharmacy Vancomycin Initial Note  Date of Service 2022  Patient's  1980  41 year old, male    Indication: Sepsis    Current estimated CrCl = CrCl cannot be calculated (Unknown ideal weight.).    Creatinine for last 3 days  2022:  1:57 PM Creatinine 0.93 mg/dL    Recent Vancomycin Level(s) for last 3 days  No results found for requested labs within last 72 hours.      Vancomycin IV Administrations (past 72 hours)      No vancomycin orders with administrations in past 72 hours.                Nephrotoxins and other renal medications (From now, onward)    Start     Dose/Rate Route Frequency Ordered Stop    22 1530  piperacillin-tazobactam (ZOSYN) 3.375 g vial to attach to  mL bag         3.375 g  over 30 Minutes Intravenous ONCE 22 1502      22 1530  vancomycin 1500 mg in 0.9% NaCl 250 ml intermittent infusion 1,500 mg         1,500 mg  over 90 Minutes Intravenous ONCE 22 1507            Contrast Orders - past 72 hours (72h ago, onward)            Start     Dose/Rate Route Frequency Ordered Stop    22 1500  iopamidol (ISOVUE-370) solution 100 mL         100 mL Intravenous ONCE 22 1457 22 1510              Plan:  1. Start vancomycin 1500 mg IV once in the Emergency Department.   2. Vancomycin monitoring method: AUC  3. Vancomycin therapeutic monitoring goal: 400-600 mg*h/L    Renetta Nicole RP

## 2022-01-05 NOTE — PROGRESS NOTES
Patient placed on HFNC 60L/85% FiO2. Sat 95%, RR 40, , coarse and diminished BS. ABG setting above were pH 7.38, PCO2 26, PO2 69, Bicarb 18, SaO2 89.9%. Patient desats with activity. RT to monitor and follow closely.    Ga Abdi, RT

## 2022-01-05 NOTE — H&P
[facility] MEDICINE ADMISSION HISTORY AND PHYSICAL     Assessment & Plan      Brent Lau is a 41 year old old male who presented to the emergency department with complaints of shortness of breath.  Did have Covid infection in November 2021.  Covid PCR here negative, CT chest with no pulmonary emboli but dense bilateral lower lobe infiltrates.  Was tachycardic, hypoxic, elevated lactic acid.  Treated with sepsis protocol, vancomycin, Zosyn, high flow nasal cannula.    Assessment and Plan:  Acute respiratory failure with hypoxia  Lactic acidosis  Most likely sepsis related to pneumonia, 30mg/kg IV bolus ordered in ED  Recovered Covid status  Continue high flow nasal cannula, high risk for decompensation  Wonder about aspiration pneumonia given history of alcohol use, reflux  Pulmonary/critical care has been consulted, plan for admission to ICU if bed available  Full code  Trend lactic acid    Thrombocytopenia  Unclear etiology, hold anticoagulation for now  Trend platelets  Check fibrinogen, D-dimer, peripheral smear    Alcohol use  Denies heavy use, denies history of alcohol withdrawal  Monitor clinically, treat as needed    Gross hematuria  Urine very dark, seems bloody  Check urinalysis    Tobacco use  Advised cessation, nicotine replacement as needed    DVT proph: Mechanical   Code Status: Full  Disposition: Inpatient   Diet: N.p.o. for now except ice chips and sips with meds  Pain tx: Tylenol as needed  PT/OT: None currently      Chief Complaint  shortness of breath     HISTORY     Brent Lau is a 41 year old old male who presented with a couple days of cough, shortness of breath.  Also some nausea, diarrhea, no melena or hematochezia.  No lower extremity edema.  No abdominal pain.  Does describe occasionally waking up coughing or choking at night.  Complains of heartburn.  Symptomatically improved when lying with his head elevated.  He is a regular smoker and drinker but denies  any history of alcohol withdrawal.  Denies heavy daily alcohol use.    Past Medical History   Past Medical History:  No date: Acid reflux   Surgical History     Past Surgical History:   Procedure Laterality Date     NO PAST SURGERIES       Family History    Reviewed, and   Family History   Problem Relation Age of Onset     Diabetes Mother      Hypertension Mother      No Known Problems Sister      No Known Problems Brother       Social History      Social History     Tobacco Use     Smoking status: Current Every Day Smoker     Types: Cigarettes, Cigarettes     Smokeless tobacco: Former User   Substance Use Topics     Alcohol use: None     Drug use: None      Allergies   No Active Allergies  Prior to Admission Medications      Prior to Admission Medications   Prescriptions Last Dose Informant Patient Reported? Taking?   omeprazole (PRILOSEC) 20 MG DR capsule   Yes Yes   Sig: Take 20 mg by mouth daily      Facility-Administered Medications: None      Review of Systems   A 12 point comprehensive review of systems was negative except as noted above in HPI.  PHYSICAL EXAMINATION     Vitals    Temp:  [98  F (36.7  C)] 98  F (36.7  C)  Pulse:  [141-165] 149  Resp:  [20-36] 36  BP: (137-143)/(82-91) 141/82  FiO2 (%):  [85 %] 85 %  SpO2:  [85 %-96 %] 96 %  Examination   Physical Exam:    Gen: Thin, quite ill-appearing, -American male  ENT: no scleral icterus  Pulm: lungs are diminished to bases, no wheezing, does have increased work of breathing  CV: regular, tachycardic, no pitting edema  GI: abdomen is soft, non-tender, non-distended with active bowel sounds.  MSK: no significant peripheral pitting edema, no noticeable swelling/erythema or warmth of joints.  Derm: Skin is extremely dry  Psych: appropriate affect, slightly anxious    Tim Santamaria DO  [facility]   Phone: #[phone]

## 2022-01-05 NOTE — PHARMACY-VANCOMYCIN DOSING SERVICE
"Pharmacy Vancomycin Initial Note  Date of Service 2022  Patient's  1980  41 year old, male    Indication: Sepsis    Current estimated CrCl = CrCl cannot be calculated (Unknown ideal weight.).    Creatinine for last 3 days  2022:  1:57 PM Creatinine 0.93 mg/dL    Recent Vancomycin Level(s) for last 3 days  No results found for requested labs within last 72 hours.      Vancomycin IV Administrations (past 72 hours)                   vancomycin 1500 mg in 0.9% NaCl 250 ml intermittent infusion 1,500 mg (mg) 1,500 mg New Bag 22 1603                Nephrotoxins and other renal medications (From now, onward)    Start     Dose/Rate Route Frequency Ordered Stop    22 0400  vancomycin 1250 mg in 0.9% NaCl 250 mL intermittent infusion 1,250 mg         1,250 mg  over 90 Minutes Intravenous EVERY 12 HOURS 22 1640      22 2200  piperacillin-tazobactam (ZOSYN) 3.375 g vial to attach to  mL bag        Note to Pharmacy: For [facility], [facility] and [facility]: For Zosyn-naive patients, use the \"Zosyn initial dose + extended infusion\" order panel.    3.375 g  over 240 Minutes Intravenous EVERY 8 HOURS 22 1622      22 1530  vancomycin 1500 mg in 0.9% NaCl 250 ml intermittent infusion 1,500 mg         1,500 mg  over 90 Minutes Intravenous ONCE 22 1507            Contrast Orders - past 72 hours (72h ago, onward)            Start     Dose/Rate Route Frequency Ordered Stop    22 1500  iopamidol (ISOVUE-370) solution 100 mL         100 mL Intravenous ONCE 22 1457 22 1510          [employer] Prediction of Planned Initial Vancomycin Regimen  Loading dose: 1500 mg IV once in ED  Regimen: 1250 mg IV every 12 hours.  Start time: 04:00 on 2022  Exposure target: AUC24 (range)400-600 mg/L.hr   AUC24,ss: 594 mg/L.hr  Probability of AUC24 > 400: 84 %  Ctrough,ss: 18.2 mg/L  Probability of Ctrough,ss > 20: 43 %  Probability of nephrotoxicity (Lodise LILIAN ): 14 %     "      Plan:  1. Start vancomycin  1250 mg IV q12h.   2. Vancomycin monitoring method: AUC  3. Vancomycin therapeutic monitoring goal: 400-600 mg*h/L  4. Pharmacy will check vancomycin levels as appropriate in 1-3 Days.    5. Serum creatinine levels will be ordered daily for the first week of therapy and at least twice weekly for subsequent weeks.      Renetta Nicole RPH

## 2022-01-05 NOTE — ED TRIAGE NOTES
Patient is here with shortness of breath for the past two days. He did have covid in November. He did have one shot and is unable to have the other due to his covid. No issue of heart issues. He is coughing up white sputum at this time. Patient was placed on oyxmask 15L- O2 sat returned to 92%

## 2022-01-05 NOTE — PHARMACY-ADMISSION MEDICATION HISTORY
Pharmacy Note - Admission Medication History    Pertinent Provider Information: Patient reported taking omeprazole, stopped famotidine.      ______________________________________________________________________    Prior To Admission (PTA) med list completed and updated in EMR.       PTA Med List   Medication Sig Last Dose     omeprazole (PRILOSEC) 20 MG DR capsule Take 20 mg by mouth daily        Information source(s): Patient  Method of interview communication: in-person    Summary of Changes to PTA Med List  New: omeprazole  Discontinued: famotidine  Changed: none    Patient was asked about OTC/herbal products specifically.  PTA med list reflects this.    Allergies were reviewed, assessed, and updated with the patient.      Patient did not bring any medications to the hospital and can't retrieve from home. No multi-dose medications are available for use during hospital stay.     The information provided in this note is only as accurate as the sources available at the time of the update(s).    Thank you for the opportunity to participate in the care of this patient.    Mary Wong  1/5/2022 3:12 PM

## 2022-01-05 NOTE — ED PROVIDER NOTES
EMERGENCY DEPARTMENT ENCOUNTER      NAME: Brent Lau  AGE: 41 year old male  YOB: 1980  MRN: [id number]  EVALUATION DATE & TIME: No admission date for patient encounter.    PCP: Ke Ribeiro    ED PROVIDER: Savannah Freitas MD    Chief Complaint   Patient presents with     Shortness of Breath         FINAL IMPRESSION:  1. Acute respiratory failure with hypoxia (H)    2. Lactic acidosis    3. High anion gap metabolic acidosis    4. Sinus tachycardia    5. Tachypnea    6. Pneumonia of both lower lobes due to infectious organism          ED COURSE & MEDICAL DECISION MAKING:    Pertinent Labs & Imaging studies reviewed. (See chart for details)  41 year old male with history of GERD, single Covid immunization followed by Covid infection November 2021, though recovered who presents to the Emergency Department for evaluation of several days of subjective chills, cough, shortness of breath.  On exam patient was initially hypoxic into the 70s.  Tachycardic into the 160s upon arrival.  Differential includes recurrent Covid 19 infection, pulmonary embolism, influenza, bacterial pneumonia, symptomatic anemia, arrhythmia, ACS, new onset heart failure.    Patient initially seen and evaluated by myself in triage due to acuity and lack of bed capacity.  He was placed on monitor, IV established and blood obtained.  Placed on supplemental oxygen and this was rapidly titrated up.  Ultimately on 15 L oxygen mask.  On this patient's O2 sats around 90 to 91% so he was placed on high flow.  On high flow settings at 60 L/min, 85% FiO2 patient is satting in the mid 90s.  Twelve-lead EKG was obtained showing sinus tachycardia.  Covid influenza swab today were negative.  CBC, BMP, troponin, TSH, VBG, lactate notable for anion gap acidosis with bicarb of 15, anion gap of 21, lactate of 10.5.  Blood cultures obtained and patient was covered with vancomycin and Zosyn.  Given 2.25 mL normal saline bolus which is his full 30  mL/kg.  BNP normal.  CT PE study obtained showing negative for PE but shows severe pneumonia with dense infiltrates greatest in the posterior lower lobes.  Concerning for bacterial pneumonia, aspiration pneumonia versus severe Covid infection.  CRP, procalcitonin, urine strep/Legionella, sputum culture added onto work-up.  Patient remained stable on high flow at 60 L/min, 85% FiO2.  Sats in the mid 90s.  Respiratory distress improved.  Will be admitted for further management.  Admitted to hospitalist medicine service.  Intensivist called insulted, Dr. Vera recommends adding testing for histo and blasto as well.  This was ordered.  We will keep him on Covid precautions and retest.      ED Course as of 01/05/22 1553   Wed Jan 05, 2022   1344 Sats initially in 70s on RA.     1344 Pulse(!): 165   1344 SpO2(!): 85 %   1400 ECG 12-LEAD WITH MUSE (LHE)   1417 Platelet Count(!): 69   1425 SARS CoV2 PCR: Negative   1426 On 60LPM, 85% FiO2 high flow    1428 Carbon Dioxide(!): 15   1428 Anion Gap(!): 29   1431 RT unable to do a timely ABG.  Will get a VBG instead.   1453 Blood gas venous(!)   1500 Lactic Acid(!!): 10.5   1528 PO2 Arterial(!): 69       1:35 PM I met with the patient, obtained history, performed an initial exam, and discussed options and plan for diagnostics and treatment here in the ED.   1:59 PM I rechecked the patient. He is satting at 91% on 15L O2. I will have RT come to the ED and initiate high flow O2.   3:32 PM I paged for the hospitalist. I rechecked and updated the patient on lab, EKG, and CT results. Discussed the plan for admission and he is in agreement with this.   3:49 PM I spoke with Dr. Santamaria, hospitalist, who accepts the patient for admission.   3:53 PM spoke with Dr. Vera, intensivist     At the conclusion of the encounter I discussed the results of all of the tests and the disposition. The questions were answered. The patient or family acknowledged understanding and was agreeable with the  care plan.    CONSULTS:  intensivist     CRITICAL CARE:  Critical Care  Performed by: Savannah Freitas MD  Authorized by: Savannah Freitas MD     Total critical care time: 68 minutes  Criticalcare time was exclusive of separately billable procedures and treating other patients.  Critical care was necessary to treat or prevent imminent or life-threatening deterioration of the following conditions: Cardiopulmonary decompensation, death, disability  Critical care was time spent personally by me on the following activities: development of treatment plan with patient or surrogate, discussions with consultants, examination of patient, evaluation of patient's response totreatment, obtaining history from patient or surrogate, ordering and performing treatments and interventions, ordering and review of laboratory studies, ordering and review of radiographic studies and re-evaluation ofpatient's condition, this excludes any separately billable procedures.      MEDICATIONS GIVEN IN THE EMERGENCY:  Medications   piperacillin-tazobactam (ZOSYN) 3.375 g vial to attach to  mL bag (3.375 g Intravenous New Bag 1/5/22 1533)   0.9% sodium chloride BOLUS (has no administration in time range)   0.9% sodium chloride BOLUS (has no administration in time range)   vancomycin 1500 mg in 0.9% NaCl 250 ml intermittent infusion 1,500 mg (has no administration in time range)   0.9% sodium chloride BOLUS (1,000 mLs Intravenous New Bag 1/5/22 1523)   iopamidol (ISOVUE-370) solution 100 mL (62 mLs Intravenous Given 1/5/22 1510)       NEW PRESCRIPTIONS STARTED AT TODAY'S ER VISIT  New Prescriptions    No medications on file          =================================================================    HPI    Patient information was obtained from: patient    Use of Intrepreter: N/A        Brent Lau is a 41 year old male with pertinent medical history of sleep apnea, GERD, and tobacco use disorder, who presents via private vehicle for evaluation  of dyspnea.    Patient received one dose of the COVID-19 vaccine this past Fall with subsequent COVID-19 infection near the end of November (~1.5 months ago). 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 However, for the last several days he has had persistent dyspnea, cough, mild sore throat, chills, mild rhinorrhea only when outside, nausea, and decreased appetite. His dyspnea acutely worsened today, prompting his presentation. Denies fevers, lower extremity pain, or calf swelling. No history of DVT or PE. On initial presentation patient was noted to be hypoxic in the 70s on RA. He was placed on 6L O2 via NC with sats rising to 88%.       REVIEW OF SYSTEMS  Constitutional: Endorses chills and decreased appetite. Denies fever, weight loss or weakness  HENT: Endorses sore throat and rhinorrhea. Denies ear pain, congestion  Respiratory: Endorses SOB and cough. Denies wheeze   Cardiovascular:  No CP, palpitations  GI: Endorses nausea. Denies abdominal pain, vomiting, diarrhea  : Denies dysuria, denies hematuria  Neurologic:  Denies headache, focal weakness or sensory changes    All other systems negative unless noted in HPI.      PAST MEDICAL HISTORY:  Past Medical History:   Diagnosis Date     Acid reflux        PAST SURGICAL HISTORY:  Past Surgical History:   Procedure Laterality Date     NO PAST SURGERIES         CURRENT MEDICATIONS:    Prior to Admission Medications   Prescriptions Last Dose Informant Patient Reported? Taking?   omeprazole (PRILOSEC) 20 MG DR capsule   Yes Yes   Sig: Take 20 mg by mouth daily      Facility-Administered Medications: None       ALLERGIES:  No Active Allergies    FAMILY HISTORY:  Family History   Problem Relation Age of Onset     Diabetes Mother      Hypertension Mother      No Known Problems Sister      No Known Problems Brother        SOCIAL HISTORY:  Social History     Tobacco Use     Smoking status: Current Every Day Smoker      Types: Cigarettes, Cigarettes     Smokeless tobacco: Former User   Substance Use Topics     Alcohol use: None     Drug use: None        VITALS:  Patient Vitals for the past 24 hrs:   BP Temp Temp src Pulse Resp SpO2 Weight   01/05/22 1514 (!) 143/89 -- -- (!) 141 (!) 33 93 % --   01/05/22 1422 -- -- -- -- -- 92 % --   01/05/22 1413 (!) 137/91 -- -- (!) 149 (!) 34 93 % --   01/05/22 1334 (!) 139/90 98  F (36.7  C) Temporal (!) 165 20 (!) 85 % 74.8 kg (165 lb)       PHYSICAL EXAM    General Appearance: Ill-appearing adult male in mild to moderate respiratory distress  Head:  Normocephalic, atraumatic  Eyes:  conjunctiva/corneas clear  ENT: Mucous membranes are dry. Lips and tongue normal; membranes without pallor  Neck:  Supple  Chest:  No tenderness or deformity, no crepitus  Cardio: Tachycardic in the 150-160s. Regular rhythm, no murmur/gallop/rub, 2+ pulses symmetric in all extremities  Pulm: Tachypneic, not able to finish sentences, mild to moderate respiratory distress with breath sounds decreased in bilateral bases  Back:  No CVA tenderness, normal ROM  Abdomen:  Soft, non-tender, non distended,no rebound or guarding.  Extremities: Moves extremities normally.  No notable peripheral edema  Skin:  Skin warm, dry, no rashes  Neuro:  Alert and oriented ×3, moving all extremities, no gross sensory defects         RADIOLOGY/LABS:  Reviewed all pertinent imaging. Please see official radiology report. All pertinent labs reviewed and interpreted.    Results for orders placed or performed during the hospital encounter of 01/05/22   CT Chest Pulmonary Embolism w Contrast    Impression    IMPRESSION:  1.  Severe pneumonia with some patchy groundglass infiltrates but also some very dense consolidative infiltrates most prominent in the lower lobes posteriorly. A superimposed bacterial pneumonia or aspiration pneumonia should be considered although   severe COVID pneumonia could have this appearance pattern is slightly  atypical.    2.  Negative for pulmonary emboli.   Symptomatic; Unknown Influenza A/B & SARS-CoV2 (COVID-19) Virus PCR Multiplex Nasopharyngeal    Specimen: Nasopharyngeal; Swab   Result Value Ref Range    Influenza A PCR Negative Negative    Influenza B PCR Negative Negative    SARS CoV2 PCR Negative Negative   Basic metabolic panel   Result Value Ref Range    Sodium 138 136 - 145 mmol/L    Potassium 3.4 (L) 3.5 - 5.0 mmol/L    Chloride 94 (L) 98 - 107 mmol/L    Carbon Dioxide (CO2) 15 (L) 22 - 31 mmol/L    Anion Gap 29 (H) 5 - 18 mmol/L    Urea Nitrogen 12 8 - 22 mg/dL    Creatinine 0.93 0.70 - 1.30 mg/dL    Calcium 8.6 8.5 - 10.5 mg/dL    Glucose 93 70 - 125 mg/dL    GFR Estimate >90 >60 mL/min/1.73m2   Result Value Ref Range    Troponin I 0.02 0.00 - 0.29 ng/mL   B-Type Natriuretic Peptide ([facility] Only)   Result Value Ref Range    BNP 35 0 - 35 pg/mL   Result Value Ref Range    INR 1.08 0.85 - 1.15   Result Value Ref Range    aPTT 37 22 - 38 Seconds   Blood gas arterial   Result Value Ref Range    pH Arterial 7.38 7.37 - 7.44    pCO2 Arterial 26 (L) 35 - 45 mm Hg    pO2 Arterial 69 (L) 80 - 90 mm Hg    Bicarbonate Arterial 18 (L) 23 - 29 mmol/L    O2 Sat, Arterial 91.2 (L) 96.0 - 97.0 %    Oxyhemoglobin 89.6 (L) 96.0 - 97.0 %    Base Excess/Deficit (+/-) -10.1   mmol/L    Ventilation Mode HFNC     FIO2 0.85     Flow 60.0 LPM    Sample Stabilized Temperature 37.0 degrees C   CBC with platelets and differential   Result Value Ref Range    WBC Count 6.3 4.0 - 11.0 10e3/uL    RBC Count 4.16 (L) 4.40 - 5.90 10e6/uL    Hemoglobin 13.3 13.3 - 17.7 g/dL    Hematocrit 39.5 (L) 40.0 - 53.0 %    MCV 95 78 - 100 fL    MCH 32.0 26.5 - 33.0 pg    MCHC 33.7 31.5 - 36.5 g/dL    RDW 13.2 10.0 - 15.0 %    Platelet Count 69 (L) 150 - 450 10e3/uL   Extra Red Top Tube   Result Value Ref Range    Hold Specimen JIC    Lactic acid whole blood   Result Value Ref Range    Lactic Acid 10.5 (HH) 0.7 - 2.0 mmol/L   Blood gas venous   Result  Value Ref Range    pH Venous 7.37 7.35 - 7.45    pCO2 Venous 26 (L) 35 - 50 mm Hg    pO2 Venous 59 (H) 25 - 47 mm Hg    Bicarbonate Venous 17 (L) 24 - 30 mmol/L    Base Excess/Deficit (+/-) -10.2   mmol/L    Oxyhemoglobin Venous 81.3 (H) 70.0 - 75.0 %    O2 Sat, Venous 83.0 (H) 70.0 - 75.0 %   Manual Differential   Result Value Ref Range    % Neutrophils 79 %    % Lymphocytes 16 %    % Monocytes 2 %    % Eosinophils 0 %    % Basophils 0 %    % Metamyelocytes 3 %    Absolute Neutrophils 5.0 1.6 - 8.3 10e3/uL    Absolute Lymphocytes 1.0 0.8 - 5.3 10e3/uL    Absolute Monocytes 0.1 0.0 - 1.3 10e3/uL    Absolute Eosinophils 0.0 0.0 - 0.7 10e3/uL    Absolute Basophils 0.0 0.0 - 0.2 10e3/uL    Absolute Metamyelocytes 0.2 (H) <=0.0 10e3/uL    RBC Morphology Confirmed RBC Indices     Platelet Assessment  Automated Count Confirmed. Platelet morphology is normal.     Automated Count Confirmed. Platelet morphology is normal.   TSH with free T4 reflex   Result Value Ref Range    TSH 0.40 0.30 - 5.00 uIU/mL       EKG:  Performed at: 13:49    Impression: Sinus tachycardia. Otherwise normal ECG.     Rate: 146 bpm  Rhythm: Sinus tachycardia  Axis: 50 60 54  AK Interval: 132 ms  QRS Interval: 86 ms  QTc Interval: 417 ms  Comparison: When compared with ECG from 7/8/2018, ventricular rate has increased by 65 bpm  I have independently reviewed and interpreted the EKG(s) documented above.    The creation of this record is based on the scribe s observations of the work being performed by Savannah Freitas MD and the provider s statements to them. It was created on her behalf by Shakira Rankin, a trained medical scribe. This document has been checked and approved by the attending provider.    Savannah Freitas MD  Emergency Medicine  [facility] EMERGENCY ROOM  [address] [id number] [phone]  Dept: [phone]      Savannah Freitas MD  01/05/22 3059

## 2022-01-06 ENCOUNTER — APPOINTMENT (OUTPATIENT)
Dept: CARDIOLOGY | Facility: CLINIC | Age: 42
DRG: 870 | End: 2022-01-06
Attending: SURGERY
Payer: COMMERCIAL

## 2022-01-06 ENCOUNTER — APPOINTMENT (OUTPATIENT)
Dept: GENERAL RADIOLOGY | Facility: CLINIC | Age: 42
DRG: 870 | End: 2022-01-06
Attending: SURGERY
Payer: COMMERCIAL

## 2022-01-06 ENCOUNTER — HOSPITAL ENCOUNTER (INPATIENT)
Facility: CLINIC | Age: 42
LOS: 11 days | Discharge: HOME | DRG: 870 | End: 2022-01-17
Attending: SURGERY | Admitting: SURGERY
Payer: COMMERCIAL

## 2022-01-06 DIAGNOSIS — U07.1 PNEUMONIA DUE TO 2019 NOVEL CORONAVIRUS: ICD-10-CM

## 2022-01-06 DIAGNOSIS — J96.01 ACUTE RESPIRATORY FAILURE WITH HYPOXIA (H): ICD-10-CM

## 2022-01-06 DIAGNOSIS — R78.81 PNEUMOCOCCAL BACTEREMIA: ICD-10-CM

## 2022-01-06 DIAGNOSIS — B95.3 PNEUMOCOCCAL BACTEREMIA: ICD-10-CM

## 2022-01-06 DIAGNOSIS — F10.10 ALCOHOL ABUSE: ICD-10-CM

## 2022-01-06 DIAGNOSIS — E87.20 LACTIC ACIDOSIS: Primary | ICD-10-CM

## 2022-01-06 DIAGNOSIS — J12.82 PNEUMONIA DUE TO 2019 NOVEL CORONAVIRUS: ICD-10-CM

## 2022-01-06 PROBLEM — J96.90 RESPIRATORY FAILURE (H): Status: ACTIVE | Noted: 2022-01-06

## 2022-01-06 LAB
ALBUMIN SERPL-MCNC: 1.6 G/DL (ref 3.4–5)
ALBUMIN SERPL-MCNC: 1.8 G/DL (ref 3.4–5)
ANION GAP SERPL CALCULATED.3IONS-SCNC: 11 MMOL/L (ref 3–14)
ANION GAP SERPL CALCULATED.3IONS-SCNC: 11 MMOL/L (ref 3–14)
ANION GAP SERPL CALCULATED.3IONS-SCNC: 16 MMOL/L (ref 3–14)
ANION GAP SERPL CALCULATED.3IONS-SCNC: 9 MMOL/L (ref 3–14)
ATRIAL RATE - MUSE: 141 BPM
BASE EXCESS BLDA CALC-SCNC: -5.2 MMOL/L (ref -9–1.8)
BASE EXCESS BLDA CALC-SCNC: 2.6 MMOL/L (ref -9–1.8)
BUN SERPL-MCNC: 10 MG/DL (ref 7–30)
BUN SERPL-MCNC: 10 MG/DL (ref 7–30)
BUN SERPL-MCNC: 11 MG/DL (ref 7–30)
BUN SERPL-MCNC: 9 MG/DL (ref 7–30)
CA-I BLD-MCNC: 3.8 MG/DL (ref 4.4–5.2)
CA-I BLD-MCNC: 5 MG/DL (ref 4.4–5.2)
CALCIUM SERPL-MCNC: 6.2 MG/DL (ref 8.5–10.1)
CALCIUM SERPL-MCNC: 8.9 MG/DL (ref 8.5–10.1)
CALCIUM SERPL-MCNC: 9.2 MG/DL (ref 8.5–10.1)
CALCIUM SERPL-MCNC: 9.7 MG/DL (ref 8.5–10.1)
CHLORIDE BLD-SCNC: 102 MMOL/L (ref 94–109)
CHLORIDE BLD-SCNC: 103 MMOL/L (ref 94–109)
CHLORIDE BLD-SCNC: 104 MMOL/L (ref 94–109)
CHLORIDE BLD-SCNC: 104 MMOL/L (ref 94–109)
CK SERPL-CCNC: 163 U/L (ref 30–300)
CO2 SERPL-SCNC: 16 MMOL/L (ref 20–32)
CO2 SERPL-SCNC: 19 MMOL/L (ref 20–32)
CO2 SERPL-SCNC: 23 MMOL/L (ref 20–32)
CO2 SERPL-SCNC: 23 MMOL/L (ref 20–32)
CREAT SERPL-MCNC: 0.5 MG/DL (ref 0.66–1.25)
CREAT SERPL-MCNC: 0.63 MG/DL (ref 0.66–1.25)
CREAT SERPL-MCNC: 0.63 MG/DL (ref 0.66–1.25)
CREAT SERPL-MCNC: 0.68 MG/DL (ref 0.66–1.25)
DIASTOLIC BLOOD PRESSURE - MUSE: NORMAL MMHG
ENTEROCOCCUS FAECALIS: NOT DETECTED
ENTEROCOCCUS FAECIUM: NOT DETECTED
ERYTHROCYTE [DISTWIDTH] IN BLOOD BY AUTOMATED COUNT: 13.4 % (ref 10–15)
GFR SERPL CREATININE-BSD FRML MDRD: >90 ML/MIN/1.73M2
GLUCOSE BLD-MCNC: 109 MG/DL (ref 70–99)
GLUCOSE BLD-MCNC: 134 MG/DL (ref 70–99)
GLUCOSE BLD-MCNC: 88 MG/DL (ref 70–99)
GLUCOSE BLD-MCNC: 96 MG/DL (ref 70–99)
GLUCOSE BLDC GLUCOMTR-MCNC: 100 MG/DL (ref 70–99)
GLUCOSE BLDC GLUCOMTR-MCNC: 100 MG/DL (ref 70–99)
GLUCOSE BLDC GLUCOMTR-MCNC: 101 MG/DL (ref 70–99)
GLUCOSE BLDC GLUCOMTR-MCNC: 127 MG/DL (ref 70–99)
GLUCOSE BLDC GLUCOMTR-MCNC: 41 MG/DL (ref 70–99)
HCO3 BLD-SCNC: 19 MMOL/L (ref 21–28)
HCO3 BLD-SCNC: 24 MMOL/L (ref 21–28)
HCT VFR BLD AUTO: 30.6 % (ref 40–53)
HGB BLD-MCNC: 11 G/DL (ref 13.3–17.7)
INTERPRETATION ECG - MUSE: NORMAL
KETONES BLD-SCNC: 1.6 MMOL/L (ref 0–0.6)
L PNEUMO1 AG UR QL IA: NEGATIVE
LACTATE SERPL-SCNC: 3.8 MMOL/L (ref 0.7–2)
LACTATE SERPL-SCNC: 4 MMOL/L (ref 0.7–2)
LACTATE SERPL-SCNC: 4.4 MMOL/L (ref 0.7–2)
LACTATE SERPL-SCNC: 6 MMOL/L (ref 0.7–2)
LISTERIA SPECIES (DETECTED/NOT DETECTED): NOT DETECTED
LVEF ECHO: NORMAL
MAGNESIUM SERPL-MCNC: 2 MG/DL (ref 1.6–2.3)
MAGNESIUM SERPL-MCNC: 2.1 MG/DL (ref 1.6–2.3)
MCH RBC QN AUTO: 35 PG (ref 26.5–33)
MCHC RBC AUTO-ENTMCNC: 35.9 G/DL (ref 31.5–36.5)
MCV RBC AUTO: 98 FL (ref 78–100)
MRSA DNA SPEC QL NAA+PROBE: NEGATIVE
O2/TOTAL GAS SETTING VFR VENT: 40 %
O2/TOTAL GAS SETTING VFR VENT: 60 %
OXYHGB MFR BLD: 97 % (ref 92–100)
P AXIS - MUSE: 67 DEGREES
PATH REPORT.COMMENTS IMP SPEC: NORMAL
PATH REPORT.COMMENTS IMP SPEC: NORMAL
PATH REPORT.FINAL DX SPEC: NORMAL
PATH REPORT.MICROSCOPIC SPEC OTHER STN: NORMAL
PATH REPORT.RELEVANT HX SPEC: NORMAL
PCO2 BLD: 26 MM HG (ref 35–45)
PCO2 BLD: 31 MM HG (ref 35–45)
PH BLD: 7.4 [PH] (ref 7.35–7.45)
PH BLD: 7.57 [PH] (ref 7.35–7.45)
PHOSPHATE SERPL-MCNC: 1.3 MG/DL (ref 2.5–4.5)
PHOSPHATE SERPL-MCNC: 1.8 MG/DL (ref 2.5–4.5)
PHOSPHATE SERPL-MCNC: 1.8 MG/DL (ref 2.5–4.5)
PHOSPHATE SERPL-MCNC: 2.3 MG/DL (ref 2.5–4.5)
PLAT MORPH BLD: NORMAL
PLATELET # BLD AUTO: 44 10E3/UL (ref 150–450)
PO2 BLD: 111 MM HG (ref 80–105)
PO2 BLD: 91 MM HG (ref 80–105)
POTASSIUM BLD-SCNC: 3.2 MMOL/L (ref 3.4–5.3)
POTASSIUM BLD-SCNC: 3.3 MMOL/L (ref 3.4–5.3)
POTASSIUM BLD-SCNC: 4.3 MMOL/L (ref 3.4–5.3)
POTASSIUM BLD-SCNC: 4.6 MMOL/L (ref 3.4–5.3)
PR INTERVAL - MUSE: 130 MS
QRS DURATION - MUSE: 78 MS
QT - MUSE: 290 MS
QTC - MUSE: 444 MS
R AXIS - MUSE: 50 DEGREES
RBC # BLD AUTO: 3.14 10E6/UL (ref 4.4–5.9)
RBC MORPH BLD: NORMAL
S PNEUM AG SPEC QL: POSITIVE
SA TARGET DNA: POSITIVE
SARS-COV-2 RNA RESP QL NAA+PROBE: NEGATIVE
SODIUM SERPL-SCNC: 134 MMOL/L (ref 133–144)
SODIUM SERPL-SCNC: 134 MMOL/L (ref 133–144)
SODIUM SERPL-SCNC: 136 MMOL/L (ref 133–144)
SODIUM SERPL-SCNC: 137 MMOL/L (ref 133–144)
STAPHYLOCOCCUS AUREUS: NOT DETECTED
STAPHYLOCOCCUS EPIDERMIDIS: NOT DETECTED
STAPHYLOCOCCUS LUGDUNENSIS: NOT DETECTED
STAPHYLOCOCCUS SPECIES: NOT DETECTED
STREPTOCOCCUS AGALACTIAE: NOT DETECTED
STREPTOCOCCUS ANGINOSUS GROUP: NOT DETECTED
STREPTOCOCCUS PNEUMONIAE: DETECTED
STREPTOCOCCUS PYOGENES: NOT DETECTED
SYSTOLIC BLOOD PRESSURE - MUSE: NORMAL MMHG
T AXIS - MUSE: 67 DEGREES
VENTRICULAR RATE- MUSE: 141 BPM
WBC # BLD AUTO: 1.5 10E3/UL (ref 4–11)

## 2022-01-06 PROCEDURE — XW033E5 INTRODUCTION OF REMDESIVIR ANTI-INFECTIVE INTO PERIPHERAL VEIN, PERCUTANEOUS APPROACH, NEW TECHNOLOGY GROUP 5: ICD-10-PCS | Performed by: SURGERY

## 2022-01-06 PROCEDURE — 250N000013 HC RX MED GY IP 250 OP 250 PS 637: Performed by: SURGERY

## 2022-01-06 PROCEDURE — 258N000003 HC RX IP 258 OP 636

## 2022-01-06 PROCEDURE — 83605 ASSAY OF LACTIC ACID: CPT

## 2022-01-06 PROCEDURE — 99291 CRITICAL CARE FIRST HOUR: CPT | Mod: GC | Performed by: INTERNAL MEDICINE

## 2022-01-06 PROCEDURE — 82805 BLOOD GASES W/O2 SATURATION: CPT

## 2022-01-06 PROCEDURE — 258N000003 HC RX IP 258 OP 636: Performed by: SURGERY

## 2022-01-06 PROCEDURE — 250N000013 HC RX MED GY IP 250 OP 250 PS 637: Performed by: STUDENT IN AN ORGANIZED HEALTH CARE EDUCATION/TRAINING PROGRAM

## 2022-01-06 PROCEDURE — 74018 RADEX ABDOMEN 1 VIEW: CPT | Mod: 26 | Performed by: STUDENT IN AN ORGANIZED HEALTH CARE EDUCATION/TRAINING PROGRAM

## 2022-01-06 PROCEDURE — 83735 ASSAY OF MAGNESIUM: CPT | Performed by: STUDENT IN AN ORGANIZED HEALTH CARE EDUCATION/TRAINING PROGRAM

## 2022-01-06 PROCEDURE — 82550 ASSAY OF CK (CPK): CPT | Performed by: STUDENT IN AN ORGANIZED HEALTH CARE EDUCATION/TRAINING PROGRAM

## 2022-01-06 PROCEDURE — 82330 ASSAY OF CALCIUM: CPT | Performed by: STUDENT IN AN ORGANIZED HEALTH CARE EDUCATION/TRAINING PROGRAM

## 2022-01-06 PROCEDURE — 255N000002 HC RX 255 OP 636: Performed by: INTERNAL MEDICINE

## 2022-01-06 PROCEDURE — 71045 X-RAY EXAM CHEST 1 VIEW: CPT | Mod: 26 | Performed by: STUDENT IN AN ORGANIZED HEALTH CARE EDUCATION/TRAINING PROGRAM

## 2022-01-06 PROCEDURE — 999N000157 HC STATISTIC RCP TIME EA 10 MIN

## 2022-01-06 PROCEDURE — 258N000003 HC RX IP 258 OP 636: Performed by: STUDENT IN AN ORGANIZED HEALTH CARE EDUCATION/TRAINING PROGRAM

## 2022-01-06 PROCEDURE — 250N000011 HC RX IP 250 OP 636: Performed by: STUDENT IN AN ORGANIZED HEALTH CARE EDUCATION/TRAINING PROGRAM

## 2022-01-06 PROCEDURE — 93306 TTE W/DOPPLER COMPLETE: CPT | Mod: 26 | Performed by: STUDENT IN AN ORGANIZED HEALTH CARE EDUCATION/TRAINING PROGRAM

## 2022-01-06 PROCEDURE — 80048 BASIC METABOLIC PNL TOTAL CA: CPT | Performed by: SURGERY

## 2022-01-06 PROCEDURE — U0003 INFECTIOUS AGENT DETECTION BY NUCLEIC ACID (DNA OR RNA); SEVERE ACUTE RESPIRATORY SYNDROME CORONAVIRUS 2 (SARS-COV-2) (CORONAVIRUS DISEASE [COVID-19]), AMPLIFIED PROBE TECHNIQUE, MAKING USE OF HIGH THROUGHPUT TECHNOLOGIES AS DESCRIBED BY CMS-2020-01-R: HCPCS | Performed by: STUDENT IN AN ORGANIZED HEALTH CARE EDUCATION/TRAINING PROGRAM

## 2022-01-06 PROCEDURE — 82330 ASSAY OF CALCIUM: CPT | Performed by: SURGERY

## 2022-01-06 PROCEDURE — 80069 RENAL FUNCTION PANEL: CPT | Performed by: STUDENT IN AN ORGANIZED HEALTH CARE EDUCATION/TRAINING PROGRAM

## 2022-01-06 PROCEDURE — 200N000002 HC R&B ICU UMMC

## 2022-01-06 PROCEDURE — 84100 ASSAY OF PHOSPHORUS: CPT | Performed by: STUDENT IN AN ORGANIZED HEALTH CARE EDUCATION/TRAINING PROGRAM

## 2022-01-06 PROCEDURE — 87641 MR-STAPH DNA AMP PROBE: CPT | Performed by: STUDENT IN AN ORGANIZED HEALTH CARE EDUCATION/TRAINING PROGRAM

## 2022-01-06 PROCEDURE — 999N000065 XR CHEST PORT 1 VIEW

## 2022-01-06 PROCEDURE — 258N000001 HC RX 258: Performed by: STUDENT IN AN ORGANIZED HEALTH CARE EDUCATION/TRAINING PROGRAM

## 2022-01-06 PROCEDURE — 3E043XZ INTRODUCTION OF VASOPRESSOR INTO CENTRAL VEIN, PERCUTANEOUS APPROACH: ICD-10-PCS | Performed by: INTERNAL MEDICINE

## 2022-01-06 PROCEDURE — 84100 ASSAY OF PHOSPHORUS: CPT

## 2022-01-06 PROCEDURE — 250N000013 HC RX MED GY IP 250 OP 250 PS 637

## 2022-01-06 PROCEDURE — 82010 KETONE BODYS QUAN: CPT | Performed by: STUDENT IN AN ORGANIZED HEALTH CARE EDUCATION/TRAINING PROGRAM

## 2022-01-06 PROCEDURE — 999N000208 ECHOCARDIOGRAM COMPLETE

## 2022-01-06 PROCEDURE — 94003 VENT MGMT INPAT SUBQ DAY: CPT

## 2022-01-06 PROCEDURE — 250N000009 HC RX 250: Performed by: STUDENT IN AN ORGANIZED HEALTH CARE EDUCATION/TRAINING PROGRAM

## 2022-01-06 PROCEDURE — 93010 ELECTROCARDIOGRAM REPORT: CPT | Performed by: INTERNAL MEDICINE

## 2022-01-06 PROCEDURE — 93005 ELECTROCARDIOGRAM TRACING: CPT

## 2022-01-06 PROCEDURE — 82565 ASSAY OF CREATININE: CPT | Performed by: STUDENT IN AN ORGANIZED HEALTH CARE EDUCATION/TRAINING PROGRAM

## 2022-01-06 PROCEDURE — 83605 ASSAY OF LACTIC ACID: CPT | Performed by: STUDENT IN AN ORGANIZED HEALTH CARE EDUCATION/TRAINING PROGRAM

## 2022-01-06 PROCEDURE — 250N000011 HC RX IP 250 OP 636

## 2022-01-06 PROCEDURE — C9113 INJ PANTOPRAZOLE SODIUM, VIA: HCPCS | Performed by: STUDENT IN AN ORGANIZED HEALTH CARE EDUCATION/TRAINING PROGRAM

## 2022-01-06 PROCEDURE — 85014 HEMATOCRIT: CPT | Performed by: STUDENT IN AN ORGANIZED HEALTH CARE EDUCATION/TRAINING PROGRAM

## 2022-01-06 PROCEDURE — 80048 BASIC METABOLIC PNL TOTAL CA: CPT | Performed by: STUDENT IN AN ORGANIZED HEALTH CARE EDUCATION/TRAINING PROGRAM

## 2022-01-06 PROCEDURE — 82803 BLOOD GASES ANY COMBINATION: CPT | Performed by: STUDENT IN AN ORGANIZED HEALTH CARE EDUCATION/TRAINING PROGRAM

## 2022-01-06 PROCEDURE — 87205 SMEAR GRAM STAIN: CPT | Performed by: STUDENT IN AN ORGANIZED HEALTH CARE EDUCATION/TRAINING PROGRAM

## 2022-01-06 PROCEDURE — 5A1955Z RESPIRATORY VENTILATION, GREATER THAN 96 CONSECUTIVE HOURS: ICD-10-PCS | Performed by: SURGERY

## 2022-01-06 PROCEDURE — 250N000009 HC RX 250: Performed by: SURGERY

## 2022-01-06 PROCEDURE — 85060 BLOOD SMEAR INTERPRETATION: CPT | Performed by: PATHOLOGY

## 2022-01-06 PROCEDURE — 999N000065 XR ABDOMEN PORT 1 VIEWS

## 2022-01-06 RX ORDER — CEFTRIAXONE 2 G/1
2 INJECTION, POWDER, FOR SOLUTION INTRAMUSCULAR; INTRAVENOUS EVERY 24 HOURS
Status: DISCONTINUED | OUTPATIENT
Start: 2022-01-06 | End: 2022-01-10

## 2022-01-06 RX ORDER — POTASSIUM CHLORIDE 1.5 G/1.58G
40 POWDER, FOR SOLUTION ORAL ONCE
Status: COMPLETED | OUTPATIENT
Start: 2022-01-06 | End: 2022-01-06

## 2022-01-06 RX ORDER — ACETAMINOPHEN 325 MG/1
650 TABLET ORAL EVERY 4 HOURS PRN
Status: DISCONTINUED | OUTPATIENT
Start: 2022-01-06 | End: 2022-01-17 | Stop reason: HOSPADM

## 2022-01-06 RX ORDER — MIDAZOLAM HCL IN 0.9 % NACL/PF 1 MG/ML
1-8 PLASTIC BAG, INJECTION (ML) INTRAVENOUS CONTINUOUS
Status: DISCONTINUED | OUTPATIENT
Start: 2022-01-06 | End: 2022-01-07

## 2022-01-06 RX ORDER — MULTIVITAMIN,THERAPEUTIC
1 TABLET ORAL DAILY
Status: DISCONTINUED | OUTPATIENT
Start: 2022-01-06 | End: 2022-01-06

## 2022-01-06 RX ORDER — DEXTROSE MONOHYDRATE 100 MG/ML
INJECTION, SOLUTION INTRAVENOUS CONTINUOUS PRN
Status: DISCONTINUED | OUTPATIENT
Start: 2022-01-06 | End: 2022-01-17 | Stop reason: HOSPADM

## 2022-01-06 RX ORDER — POTASSIUM CHLORIDE 1.5 G/1.58G
40 POWDER, FOR SOLUTION ORAL
Status: COMPLETED | OUTPATIENT
Start: 2022-01-06 | End: 2022-01-06

## 2022-01-06 RX ORDER — MULTIVITAMIN,THERAPEUTIC
1 TABLET ORAL DAILY
Status: DISCONTINUED | OUTPATIENT
Start: 2022-01-06 | End: 2022-01-17 | Stop reason: HOSPADM

## 2022-01-06 RX ORDER — NOREPINEPHRINE BITARTRATE 0.06 MG/ML
.01-.6 INJECTION, SOLUTION INTRAVENOUS CONTINUOUS
Status: DISCONTINUED | OUTPATIENT
Start: 2022-01-06 | End: 2022-01-08

## 2022-01-06 RX ORDER — FOLIC ACID 1 MG/1
1 TABLET ORAL DAILY
Status: DISCONTINUED | OUTPATIENT
Start: 2022-01-06 | End: 2022-01-06

## 2022-01-06 RX ORDER — AMINO AC/PROTEIN HYDR/WHEY PRO 10G-100/30
1 LIQUID (ML) ORAL 3 TIMES DAILY
Status: DISCONTINUED | OUTPATIENT
Start: 2022-01-06 | End: 2022-01-15

## 2022-01-06 RX ORDER — DEXTROSE MONOHYDRATE 25 G/50ML
25-50 INJECTION, SOLUTION INTRAVENOUS
Status: DISCONTINUED | OUTPATIENT
Start: 2022-01-06 | End: 2022-01-10

## 2022-01-06 RX ORDER — ALBUTEROL SULFATE 0.83 MG/ML
2.5 SOLUTION RESPIRATORY (INHALATION) EVERY 6 HOURS PRN
Status: DISCONTINUED | OUTPATIENT
Start: 2022-01-06 | End: 2022-01-17 | Stop reason: HOSPADM

## 2022-01-06 RX ORDER — POLYETHYLENE GLYCOL 3350 17 G/17G
17 POWDER, FOR SOLUTION ORAL DAILY PRN
Status: DISCONTINUED | OUTPATIENT
Start: 2022-01-06 | End: 2022-01-17 | Stop reason: HOSPADM

## 2022-01-06 RX ORDER — NALOXONE HYDROCHLORIDE 0.4 MG/ML
0.4 INJECTION, SOLUTION INTRAMUSCULAR; INTRAVENOUS; SUBCUTANEOUS
Status: DISCONTINUED | OUTPATIENT
Start: 2022-01-06 | End: 2022-01-17 | Stop reason: HOSPADM

## 2022-01-06 RX ORDER — NALOXONE HYDROCHLORIDE 0.4 MG/ML
0.2 INJECTION, SOLUTION INTRAMUSCULAR; INTRAVENOUS; SUBCUTANEOUS
Status: DISCONTINUED | OUTPATIENT
Start: 2022-01-06 | End: 2022-01-17 | Stop reason: HOSPADM

## 2022-01-06 RX ORDER — NICOTINE POLACRILEX 4 MG
15-30 LOZENGE BUCCAL
Status: DISCONTINUED | OUTPATIENT
Start: 2022-01-06 | End: 2022-01-10

## 2022-01-06 RX ORDER — PROPOFOL 10 MG/ML
5-75 INJECTION, EMULSION INTRAVENOUS CONTINUOUS
Status: DISCONTINUED | OUTPATIENT
Start: 2022-01-06 | End: 2022-01-08

## 2022-01-06 RX ORDER — PIPERACILLIN SODIUM, TAZOBACTAM SODIUM 4; .5 G/20ML; G/20ML
4.5 INJECTION, POWDER, LYOPHILIZED, FOR SOLUTION INTRAVENOUS EVERY 6 HOURS
Status: DISCONTINUED | OUTPATIENT
Start: 2022-01-06 | End: 2022-01-06

## 2022-01-06 RX ORDER — SENNOSIDES 8.6 MG
8.6 TABLET ORAL 2 TIMES DAILY
Status: DISCONTINUED | OUTPATIENT
Start: 2022-01-06 | End: 2022-01-13

## 2022-01-06 RX ORDER — ACETAMINOPHEN 325 MG/1
975 TABLET ORAL EVERY 8 HOURS PRN
Status: DISCONTINUED | OUTPATIENT
Start: 2022-01-06 | End: 2022-01-06

## 2022-01-06 RX ORDER — FOLIC ACID 1 MG/1
1 TABLET ORAL DAILY
Status: DISCONTINUED | OUTPATIENT
Start: 2022-01-06 | End: 2022-01-17 | Stop reason: HOSPADM

## 2022-01-06 RX ADMIN — THERA TABS 1 TABLET: TAB at 08:34

## 2022-01-06 RX ADMIN — DEXTROSE MONOHYDRATE 50 ML: 500 INJECTION PARENTERAL at 01:07

## 2022-01-06 RX ADMIN — PROPOFOL 60 MCG/KG/MIN: 10 INJECTION, EMULSION INTRAVENOUS at 18:44

## 2022-01-06 RX ADMIN — SODIUM PHOSPHATE, MONOBASIC, MONOHYDRATE AND SODIUM PHOSPHATE, DIBASIC, ANHYDROUS 30 MMOL: 276; 142 INJECTION, SOLUTION INTRAVENOUS at 10:17

## 2022-01-06 RX ADMIN — PROPOFOL 60 MCG/KG/MIN: 10 INJECTION, EMULSION INTRAVENOUS at 21:46

## 2022-01-06 RX ADMIN — PANTOPRAZOLE SODIUM 40 MG: 40 INJECTION, POWDER, FOR SOLUTION INTRAVENOUS at 08:34

## 2022-01-06 RX ADMIN — POTASSIUM & SODIUM PHOSPHATES POWDER PACK 280-160-250 MG 2 PACKET: 280-160-250 PACK at 01:43

## 2022-01-06 RX ADMIN — SODIUM CHLORIDE, POTASSIUM CHLORIDE, SODIUM LACTATE AND CALCIUM CHLORIDE 1000 ML: 600; 310; 30; 20 INJECTION, SOLUTION INTRAVENOUS at 03:39

## 2022-01-06 RX ADMIN — FOLIC ACID 1 MG: 1 TABLET ORAL at 08:34

## 2022-01-06 RX ADMIN — PIPERACILLIN AND TAZOBACTAM 4.5 G: 4; .5 INJECTION, POWDER, LYOPHILIZED, FOR SOLUTION INTRAVENOUS; PARENTERAL at 05:49

## 2022-01-06 RX ADMIN — Medication 0.07 MCG/KG/MIN: at 01:51

## 2022-01-06 RX ADMIN — POTASSIUM PHOSPHATE, MONOBASIC AND POTASSIUM PHOSPHATE, DIBASIC 15 MMOL: 224; 236 INJECTION, SOLUTION, CONCENTRATE INTRAVENOUS at 22:30

## 2022-01-06 RX ADMIN — THIAMINE HYDROCHLORIDE 500 MG: 100 INJECTION, SOLUTION INTRAMUSCULAR; INTRAVENOUS at 20:16

## 2022-01-06 RX ADMIN — PROPOFOL 60 MCG/KG/MIN: 10 INJECTION, EMULSION INTRAVENOUS at 01:40

## 2022-01-06 RX ADMIN — HUMAN ALBUMIN MICROSPHERES AND PERFLUTREN 5 ML: 10; .22 INJECTION, SOLUTION INTRAVENOUS at 07:24

## 2022-01-06 RX ADMIN — CEFTRIAXONE SODIUM 2 G: 2 INJECTION, POWDER, FOR SOLUTION INTRAMUSCULAR; INTRAVENOUS at 10:17

## 2022-01-06 RX ADMIN — PROPOFOL 30 MCG/KG/MIN: 10 INJECTION, EMULSION INTRAVENOUS at 10:23

## 2022-01-06 RX ADMIN — SODIUM CHLORIDE, POTASSIUM CHLORIDE, SODIUM LACTATE AND CALCIUM CHLORIDE 1000 ML: 600; 310; 30; 20 INJECTION, SOLUTION INTRAVENOUS at 08:30

## 2022-01-06 RX ADMIN — Medication 1 PACKET: at 10:47

## 2022-01-06 RX ADMIN — Medication 1 PACKET: at 13:44

## 2022-01-06 RX ADMIN — FENTANYL CITRATE 75 MCG/HR: 50 INJECTION INTRAVENOUS at 13:53

## 2022-01-06 RX ADMIN — ACETAMINOPHEN 650 MG: 325 TABLET, FILM COATED ORAL at 10:50

## 2022-01-06 RX ADMIN — THIAMINE HYDROCHLORIDE 500 MG: 100 INJECTION, SOLUTION INTRAMUSCULAR; INTRAVENOUS at 13:44

## 2022-01-06 RX ADMIN — THIAMINE HCL TAB 100 MG 100 MG: 100 TAB at 08:33

## 2022-01-06 RX ADMIN — AZITHROMYCIN MONOHYDRATE 500 MG: 500 INJECTION, POWDER, LYOPHILIZED, FOR SOLUTION INTRAVENOUS at 02:23

## 2022-01-06 RX ADMIN — PROPOFOL 40 MCG/KG/MIN: 10 INJECTION, EMULSION INTRAVENOUS at 04:38

## 2022-01-06 RX ADMIN — ACETAMINOPHEN 975 MG: 325 TABLET, FILM COATED ORAL at 03:30

## 2022-01-06 RX ADMIN — Medication 4 MG/HR: at 02:36

## 2022-01-06 RX ADMIN — VANCOMYCIN HYDROCHLORIDE 1250 MG: 100 INJECTION, POWDER, LYOPHILIZED, FOR SOLUTION INTRAVENOUS at 03:56

## 2022-01-06 RX ADMIN — POTASSIUM CHLORIDE 40 MEQ: 1.5 POWDER, FOR SOLUTION ORAL at 03:30

## 2022-01-06 RX ADMIN — Medication 50 MCG: at 23:43

## 2022-01-06 RX ADMIN — SODIUM CHLORIDE, POTASSIUM CHLORIDE, SODIUM LACTATE AND CALCIUM CHLORIDE 1000 ML: 600; 310; 30; 20 INJECTION, SOLUTION INTRAVENOUS at 01:30

## 2022-01-06 RX ADMIN — CALCIUM CHLORIDE 2 G: 100 INJECTION, SOLUTION INTRAVENOUS at 03:01

## 2022-01-06 RX ADMIN — POTASSIUM CHLORIDE 40 MEQ: 1.5 POWDER, FOR SOLUTION ORAL at 20:20

## 2022-01-06 RX ADMIN — FENTANYL CITRATE 50 MCG/HR: 50 INJECTION INTRAVENOUS at 01:29

## 2022-01-06 RX ADMIN — Medication 1 PACKET: at 20:18

## 2022-01-06 RX ADMIN — STANDARDIZED SENNA CONCENTRATE 8.6 MG: 8.6 TABLET ORAL at 08:34

## 2022-01-06 RX ADMIN — POTASSIUM CHLORIDE 40 MEQ: 1.5 POWDER, FOR SOLUTION ORAL at 01:44

## 2022-01-06 RX ADMIN — THIAMINE HYDROCHLORIDE 500 MG: 100 INJECTION, SOLUTION INTRAMUSCULAR; INTRAVENOUS at 10:17

## 2022-01-06 ASSESSMENT — ACTIVITIES OF DAILY LIVING (ADL)
ADLS_ACUITY_SCORE: 10
ADLS_ACUITY_SCORE: 10
ADLS_ACUITY_SCORE: 12
ADLS_ACUITY_SCORE: 10
ADLS_ACUITY_SCORE: 10
ADLS_ACUITY_SCORE: 18
ADLS_ACUITY_SCORE: 12
ADLS_ACUITY_SCORE: 10
ADLS_ACUITY_SCORE: 12
ADLS_ACUITY_SCORE: 10
ADLS_ACUITY_SCORE: 12
ADLS_ACUITY_SCORE: 10
ADLS_ACUITY_SCORE: 12
ADLS_ACUITY_SCORE: 12
ADLS_ACUITY_SCORE: 18
ADLS_ACUITY_SCORE: 18
ADLS_ACUITY_SCORE: 12

## 2022-01-06 NOTE — PROGRESS NOTES
MEDICAL ICU PROGRESS NOTE  01/06/2022      Date of Service (when I saw the patient): 01/06/2022    ASSESSMENT: Brent Lau is a 41 year old male with PMH of tobacco use, alcohol use, and GERD who was admitted to the MICU as a transfer from OSH on 1/6/22 due to shock and acute hypoxic respiratory failure found to have strep pneumo PNA.     CHANGES and MAJOR THINGS TODAY:   - try to come down on versed, go up on propofol, fentanyl   - RASS -1 to -2   - wernicke dosing of thiamine  - high protocol phos   - strep pneumo positive  - stop vanc, zosyn, azithro  - start ceftriaxone  - recheck renal, lactate panel in afternoon and gas      PLAN:    Neuro:  # Pain and Sedation  RAAS -1 to -2   - Fentanyl gtt w/boluses  - Propofol gtt  - Wean off versed gtt w/boluses      # Alcohol use disorder  Per wife, patient drinks regularly up to 1 L of vodka a day. BAL elevated on OSH ED arrival. No charted or reported history of alcohol withdrawal/seizures.   - CIWA when extubated   - Folic acid and Multivitamin Daily  - Thiamine per Wernicke protocol   - Chemical Dependency consult when extubated      Pulmonary:  # Acute Hypoxic Respiratory Failure  # Strep Pneumo PNA  # Hx of Recent COVID infection  Patient with dense consolidative infiltrates in the lower lobes on CT c/f significant pneumonia found to have strep pneumo on blood and sputum culture as well as antigen   - Antibiotics as below      Ventilation Mode: CMV/AC  (Continuous Mandatory Ventilation/ Assist Control)  FiO2 (%): 40 %  Rate Set (breaths/minute): 28 breaths/min  Tidal Volume Set (mL): 500 mL  PEEP (cm H2O): 5 cmH2O  Oxygen Concentration (%): 40 %  Resp: 28       Cardiovascular:  # Septic shock   Likely in the setting of hypovolemia v distributive/vasoplegic v other. IVC on bedside U/S appears collapsible, could use more fluid resuscitation (2.25 L at OSH) and 2L within first few hours of transfer.   - MAP goal >65   - Pressors: Levophed, wean as able  - IVF: 1L  this AM (1/6)   - Monitor iCal (replete to ~4.4)   - ECHO with EF of 30-35% and moderate diffuse hypokinesis     GI/Nutrition:  # Nutrition  - Nutrition consult for TF (unable to start until phos repleted)     # GERD  - PPI for stress ulcer prophylaxis      # Bowel Regimen   - Senna scheduled  - Miralax PRN      Renal/Electrolytes:  # Metabolic Acidosis   # Alcoholic Ketosis  # Lactic Acidosis, improving   Adequate respiratory compensation. Lactate 10.5 on ED arrival, improved after some fluid resuscitation. Ketones elevated, likely in the setting of alcohol use. CK normal.  - Trend Lactate  - IVF, should help with lactate and ketosis   - CTM      # Hypokalemia  # Hypomagnesemia   - CTM, replete PRN     Infectious Disease:  # Sepsis   # c/f CAP  # Strep Pneumo PNA  Patient had positive strep pneumo with gram+ cocci on BCx and Sputum Cx. Legionella, Covid, influenza and MRSA negative.  - Histo, Blast pending  - Antimicrobials:  Ceftriaxone 1/6 - present   Zosyn 1/5  Vancomycin 1/5  Azithromycin 1/5     # Recent COVID  Patient with COVID infection > 1 month ago. Tested negative on ED arrival; however, due to primary respiratory problem will retest and keep on precautions overnight.      Hematology:  # Acute Thrombocytopenia   # Acute Normocytic Anemia   Likely in the setting of some likely alcoholic liver disease and some suppression in the setting of acute infection. No acute signs of bleeding on admission.   - CTM  - Hb goal > 7      Endocrine:  No active issues      MSK:  No active issues      Skin:  No active issues    General Cares/Prophylaxis:    DVT Prophylaxis: none at this time given thrombocytopenia  GI Prophylaxis: PPI  Restraints: chemical  Family Communication: Wife will be at bedside in PM, will update then  Code Status: Full Code    Lines/tubes/drains:  - PICC 1/6  - ETT 1/5  - Villatoro 1/6  - OG 1/6    Disposition:  - Medical ICU until respiratory support weaned.     Patient seen and findings/plan  discussed with medical ICU staff, Dr. Sutton.    Carie Dorado    ====================================  INTERVAL HISTORY:   Since admission patient has been stable with norepi weaned off. He is sedated well without concerns for safety. Able to wean FiO2 though continues to have tachycardia.     OBJECTIVE:   1. VITAL SIGNS:   Temp:  [98  F (36.7  C)-103.1  F (39.5  C)] 103.1  F (39.5  C)  Pulse:  [128-165] 141  Resp:  [19-50] 28  BP: ()/() 137/94  FiO2 (%):  [40 %-85 %] 40 %  SpO2:  [85 %-100 %] 99 %  Ventilation Mode: CMV/AC  (Continuous Mandatory Ventilation/ Assist Control)  FiO2 (%): 40 %  Rate Set (breaths/minute): 28 breaths/min  Tidal Volume Set (mL): 500 mL  PEEP (cm H2O): 5 cmH2O  Oxygen Concentration (%): 40 %  Resp: 28    2. INTAKE/ OUTPUT:   I/O last 3 completed shifts:  In: 3787.07 [I.V.:1147.07; NG/GT:640; IV Piggyback:2000]  Out: 925 [Urine:725; Stool:200]    3. PHYSICAL EXAMINATION:  General: sedated, supine, comfortably laying in bed  HEENT: pupils equal and reactive  Neuro: sedated, able to follow simple commands and stirs to voice  Pulm/Resp: Clear breath sounds bilaterally without rhonchi, crackles or wheeze, breathing non-labored  CV: RRR, no murmur  Abdomen: Soft, non-distended, non-tender  :  newberry catheter in place, urine yellow and clear  Incisions/Skin: no rash or lesions visible    4. LABS:   Arterial Blood Gases   Recent Labs   Lab 01/06/22  0234 01/05/22  2113 01/05/22  1518   PH 7.40 7.28* 7.38   PCO2 31* 33* 26*   PO2 111* 89 69*   HCO3 19* 16* 18*     Complete Blood Count   Recent Labs   Lab 01/06/22  0051 01/05/22  1730 01/05/22  1357   WBC 1.5* 2.4* 6.3   HGB 11.0* 11.9* 13.3   PLT 44* 54* 69*     Basic Metabolic Panel  Recent Labs   Lab 01/06/22  0829 01/06/22  0523 01/06/22  0304 01/06/22  0121 01/06/22  0100 01/06/22  0051 01/05/22  2138 01/05/22  1730 01/05/22  1357   NA  --  134  --   --   --  134  --   --  138   POTASSIUM  --  4.6  --   --   --  3.2* 3.3*  3.4* 3.4*   CHLORIDE  --  104  --   --   --  102  --   --  94*   CO2  --  19*  --   --   --  16*  --   --  15*   BUN  --  11  --   --   --  10  --   --  12   CR  --  0.63*  --   --   --  0.68  --   --  0.93   * 109* 100* 127*   < > 134*  --   --  93    < > = values in this interval not displayed.     Liver Function Tests  Recent Labs   Lab 01/05/22  1730 01/05/22  1357   *  --    ALT 43  --    ALKPHOS 56  --    BILITOTAL 1.2*  --    ALBUMIN 2.3*  --    INR  --  1.08     Coagulation Profile  Recent Labs   Lab 01/05/22  1357   INR 1.08   PTT 37       5. RADIOLOGY:   Recent Results (from the past 24 hour(s))   CT Chest Pulmonary Embolism w Contrast    Narrative    EXAM: CT CHEST PULMONARY EMBOLISM W CONTRAST  LOCATION: [facility]  DATE/TIME: 1/5/2022 2:56 PM    INDICATION: Shortness of breath. Concern for pulmonary emboli.  COMPARISON: Chest x-ray 07/30/2020  TECHNIQUE: CT chest pulmonary angiogram during arterial phase injection of IV contrast. Multiplanar reformats and MIP reconstructions were performed. Dose reduction techniques were used.   CONTRAST: 100 mL Isovue-370    FINDINGS:  ANGIOGRAM CHEST: Pulmonary arteries are normal caliber and negative for pulmonary emboli. Thoracic aorta is negative for dissection. No CT evidence of right heart strain.    LUNGS AND PLEURA: Severe pneumonia with some patchy groundglass opacities along with some very dense consolidations in both lower lobes. Slightly atypical for COVID but still consistent with severe COVID.    MEDIASTINUM/AXILLAE: No lymphadenopathy.    CORONARY ARTERY CALCIFICATION: None.    UPPER ABDOMEN: Moderate diffuse hepatic steatosis.    MUSCULOSKELETAL: Normal.      Impression    IMPRESSION:  1.  Severe pneumonia with some patchy groundglass infiltrates but also some very dense consolidative infiltrates most prominent in the lower lobes posteriorly. A superimposed bacterial pneumonia or aspiration pneumonia should be  considered although   severe COVID pneumonia could have this appearance pattern is slightly atypical.    2.  Negative for pulmonary emboli.   XR Chest Port 1 View    Narrative    EXAM: XR CHEST PORT 1 VIEW  LOCATION: [facility]  DATE/TIME: 1/5/2022 10:11 PM    INDICATION: Intubation.  COMPARISON: CT 1/5/2022.    FINDINGS: ET tube 4.5 cm above the jan. NG tube passes into the stomach. Right PICC with tip in the distal SVC. No pneumothorax. The heart size is normal. There are again bilateral pulmonary infiltrates, right greater than left.      Impression    IMPRESSION: ET tube 4.5 cm above the jan.   XR Chest Port 1 View    Narrative    EXAM: XR CHEST PORT 1 VIEW  1/6/2022 1:20 AM     HISTORY:  Endotracheal tube positioning       COMPARISON:  None    FINDINGS:     Endotracheal tube tip terminates in the mid thoracic trachea. Right  arm PICC tip projects over the right atrium. No pneumothorax or  pleural effusion. Patchy pulmonary opacity in the right lower lung.  Visualized upper abdomen is unremarkable. No acute osseous  abnormality. Enteric tube courses below the field of view      Impression    IMPRESSION:   1. Endotracheal tube tip projects in the mid thoracic trachea  approximately 2.5 cm from the jan. Additional support devices as  described above.  2. Patchy confluent appearing pulmonary opacity in the right lower  lung suspicious for infection.    I have personally reviewed the examination and initial interpretation  and I agree with the findings.    TALISHA JUAREZ MD         SYSTEM ID:  [id number]   XR Abdomen Port 1 View    Narrative    Exam: XR ABDOMEN PORT 1 VIEWS, 1/6/2022 1:21 AM    Indication: og placement    Comparison: None    Findings:   AP portable supine view of the abdomen. Enteric tube tip and sidehole  project over the stomach. Nonobstructive bowel gas pattern. No  pneumatosis or portal venous gas. Patchy opacities in the right  greater than left lung bases. No  acute osseous abnormality.      Impression    Impression: Enteric tube tip and sidehole project over the stomach.    I have personally reviewed the examination and initial interpretation  and I agree with the findings.    TALISHA JUAREZ MD         SYSTEM ID:  [id number]

## 2022-01-06 NOTE — CONSULTS
Care Management Initial Consult    General Information  Assessment completed with: Spouse or significant other, Wife Zeynep via phone [phone]  Type of CM/SW Visit: Initial Assessment    Primary Care Provider verified and updated as needed: Yes   Readmission within the last 30 days: no previous admission in last 30 days      Reason for Consult: discharge planning  Advance Care Planning: Advance Care Planning Reviewed: education/resources on health care directives provided (Given [employer] Cho[employer] information)     General Information Comments: Doesn't drive    Communication Assessment  Patient's communication style: spoken language (English or Bilingual)    Hearing Difficulty or Deaf: no   Wear Glasses or Blind: no    Cognitive  Cognitive/Neuro/Behavioral: WDL                      Living Environment:   People in home: child(shelly), dependent,spouse     Current living Arrangements: condominium      Able to return to prior arrangements: yes       Family/Social Support:  Care provided by: self  Provides care for: no one  Marital Status:   Wife  Zeynep       Description of Support System: Supportive,Involved    Support Assessment: Adequate family and caregiver support    Current Resources:   Patient receiving home care services: No     Community Resources: Financial/Insurance  Equipment currently used at home: none  Supplies currently used at home: None    Employment/Financial:  Employment Status: employed full-time        Financial Concerns: No concerns identified   Referral to Financial Counselor: No       Lifestyle & Psychosocial Needs:  Social Determinants of Health     Tobacco Use: High Risk     Smoking Tobacco Use: Current Every Day Smoker     Smokeless Tobacco Use: Former User   Alcohol Use: Not on file   Financial Resource Strain: Not on file   Food Insecurity: Not on file   Transportation Needs: Not on file   Physical Activity: Not on file   Stress: Not on file   Social Connections: Not on file    Intimate Partner Violence: Not on file   Depression: Not at risk     PHQ-2 Score: 0   Housing Stability: Not on file       Functional Status:  Prior to admission patient needed assistance:   Dependent ADLs:: Independent  Dependent IADLs:: Transportation  Assesssment of Functional Status: Not at baseline with ADL Functioning,Not at  functional baseline    Mental Health Status:          Chemical Dependency Status:                Values/Beliefs:  Spiritual, Cultural Beliefs, Restorationism Practices, Values that affect care:                 Additional Information:  Information gathered from willie Adhikari via phone ([phone]). Patient lives with wife and children in a private residence. Is independent with all I/ADLs except transportation because he doesn t drive. No home care services; no assistive device; doesn t drive. Works full-time. Plan is to return home with willie Adhikari transporting patient on discharge. Other needs pending clinical progress.    JENNIFER SANDY RN

## 2022-01-06 NOTE — PROGRESS NOTES
Antimicrobial Stewardship Team Note    Antimicrobial Stewardship Program - A joint venture between [facility] and  Physicians to optimize antibiotic management.  NOT a formal consult - Restricted Antimicrobial Review     Patient: Brent Lau  MRN: [id number]  Allergies: Patient has no known allergies.    Brief Summary: Brent Lau is a 41 year old male with a PMH significant for recovered COVID-19 infection in November 2021, sleep apnea, GERD, and tobacco use disorder admitted on 1/5 for hypoxia and sepsis.    History of Present Illness: Patient presented to [facility] Emergency Department on 1/5 complaining of several days of subjective chills, cough, and shortness of breath. At presentation he was afebrile, hypoxic with oxygen saturations into the 70s and tachycardic with heart rate into the 160s. He was placed on supplemental oxygen and rapidly titrated up to 60 liters per minute on high flow nasal cannula after satting at only 90-91% on 15 liters per minute Oxymask. His labs showed anion gap lactic acidosis with lactate of 10.5, bicarbonate of 15, and arterial pH 7.38. He had no leukocytosis with white blood cell count 6.3. Procalcitonin was elevated at 16.4 and blood alcohol was positive at 141 mg/dL. CT PE study with contrast was negative for pulmonary embolism but did show dense infiltrates in the posterior lower lobes concerning for bacterial pneumonia, aspiration pneumonia, or severe COVID-19 infection. COVID-19 and Influenza A and B PCRs were negative. The patient was started on vancomycin, Zosyn, and azithromycin for sepsis secondary to bacterial pneumonia and boarded in the Emergency Department while awaiting a bed. He continued to decompensate, with his blood pH dropping to 7.28, and the decision was made to intubate to protect his airway for transport to [facility]. His blood pressure decreased after intubation requiring a norepinephrine drip, which has now been titrated off.     Upon  transfer to [facility] early this morning, the patient remains intubated and sedated. He is febrile with a Tmax of 103.1, tachycardic with heart rate up to the 140s, and his blood pressure has recovered off the norepinephrine. He is now leukopenic with white blood cell count 2.4 overnight and now 1.5 this morning. Repeat COVID-19 PCR and MRSA/MSSA nares PCRs were negative. Legionella urine antigen study was negative, but Strep. pneumoniae urine antigen came back positive. Blood cultures drawn 1/5 grew Gram positive cocci in pairs and chains in 3/4 bottles. Verigene panel was also positive for Strep. pneumoniae. Preliminary results from sputum culture obtained this morning also showed 3+ Gram positive cocci in pairs and chains. His antibiotic therapies were narrowed to ceftriaxone 2 g daily on which he remains at this time.          Active Anti-infective Medications   (From admission, onward)                 Start     Stop    01/06/22 1000  cefTRIAXone  2 g,   Intravenous,   EVERY 24 HOURS        Community Acquired Pneumonia        --                  Assessment: Bacteremia secondary to Strep. pneumoniae pneumonia  Patient hospitalized for bacteremia and respiratory distress secondary to Strep. pneumoniae pneumonia on day 2 of antibiotic therapy. Agree that coverage for MRSA with vancomycin is not necessary, as the MRSA nares PCR was negative and the sputum and blood culture results indicate that the pathogen is a Streptococcus species. Per [facility] antibiogram, Strep. pneumoniae isolates in non-CNS infections are 100% susceptible to ceftriaxone. Agree with narrowing of coverage to ceftriaxone. Of concern in this patient is seeding of his PICC line that was placed yesterday evening. Consider removal of this PICC line as soon as possible. Recommend obtaining repeat blood cultures, one set from the PICC and one set from a peripheral site, to ensure clearance of the bacteremia which will determine day 1 for antibiotic therapy  duration. Once the bacteremia has cleared, IV antibiotics may be needed upon discharge. Oral beta-lactam antibiotics achieve unreliable concentrations in the blood, and therefore are not as optimal for treatment of bloodstream infections.     Recommendations:  Obtain repeat blood cultures today, ideally one set from PICC line and one set from peripheral site.   Consider removal of PICC line as soon as feasible.     Discussed with ID Staff Balbina Pena MD, and Beth Cordon, PharmD, BCIDP    Kaye MoranD    Vital Signs/Clinical Features:  Vitals  Report        01/04 0700 01/05 0659 01/05 0700 01/06 0659 01/06 0700  01/06 1306   Most Recent      Temp ( F)   99.4 -  102    100 -  103.1     100 (37.8) 01/06 1200    Pulse   128 -  142    115 -  142     115 01/06 1230    Resp   28 -  30      28     28 01/06 1300    BP   118/89 -  152/105    130/91 -  151/110     132/98 01/06 1230    SpO2 (%)   96 -  100    95 -  99     99 01/06 1230            Labs  CrCl cannot be calculated (Unknown ideal weight.).  Recent Labs   Lab Test 07/25/19  1414 11/02/20  2202 01/05/22  1357 01/06/22  0051 01/06/22  0523 01/06/22  1014   CR 1.03 0.64* 0.93 0.68 0.63* 0.63*       Recent Labs   Lab Test 07/08/18  1105 01/05/22  1357 01/05/22  1730 01/06/22  0051   WBC 5.0 6.3 2.4* 1.5*   ANEU  --  5.0 1.7  --    ALYM  --  1.0 0.4*  --    CHELSEY  --  0.1 0.0  --    AEOS  --  0.0 0.0  --    HGB 15.0 13.3 11.9* 11.0*   HCT 42.8 39.5* 35.7* 30.6*   MCV 89 95 96 98    69* 54* 44*       Recent Labs   Lab Test 01/05/22  1730 01/06/22  1014   BILITOTAL 1.2*  --    ALKPHOS 56  --    ALBUMIN 2.3* 1.8*   *  --    ALT 43  --        Recent Labs   Lab Test 01/05/22  1357 01/05/22  1446 01/05/22  1558 01/05/22  1730 01/05/22  2138 01/06/22  0051 01/06/22  0523   PCAL  --   --  16.40*  --   --   --   --    LACT  --  10.5*  --  9.2* 7.2* 4.4* 6.0*   CRP 39.4*  --   --   --   --   --   --              Culture Results:  7-Day Micro Results        Procedure Component Value Units Date/Time    Respiratory Aerobic Bacterial Culture [[id number]]  (Abnormal) Collected: 01/06/22 0235    Order Status: Completed Lab Status: Preliminary result Updated: 01/06/22 0633    Specimen: Sputum from Endotracheal      Gram Stain Result >25 PMNs/low power field      3+ Gram positive cocci in pairs and chains     Comment: In predominance         2+ Mixed mone    MRSA MSSA PCR, Nasal Swab [[id number]] Collected: 01/06/22 0126    Order Status: Completed Lab Status: Final result Updated: 01/06/22 0308    Specimen: Swab from Nare, Right      MRSA Target DNA Negative     SA Target DNA Positive    Narrative:      The [employer]  Xpert SA Nasal Complete assay performed in the [employer]  Dx System is a qualitative in vitro diagnostic test designed for rapid detection of Staphylococcus aureus (SA) and methicillin-resistant Staphylococcus aureus (MRSA) from nasal swabs in patients at risk for nasal colonization. The test utilizes automated real-time polymerase chain reaction (PCR) to detect MRSA/SA DNA. The Xpert SA Nasal Complete assay is intended to aid in the prevention and control of MRSA/SA infections in healthcare settings. The assay is not intended to diagnose, guide or monitor treatment for MRSA/SA infections, or provide results of susceptibility to methicillin. A negative result does not preclude MRSA/SA nasal colonization.     Legionella pneumophila antigen urine [[id number]]  (Normal) Collected: 01/05/22 1646    Order Status: Completed Lab Status: Final result Updated: 01/06/22 0058    Specimen: Urine, Midstream      Legionella pneumophila serogroup 1 urinary antigen Negative     Comment: Suggests no recent or current infection. Infection due to Legionella cannot be ruled out, since other serogroups and species may cause disease, antigen may not be present in urine in early infection, and the level of antigen present in the urine may be below detectable limits of the  test.       Streptococcus pneumoniae antigen [[id number]]  (Abnormal) Collected: 01/05/22 1646    Order Status: Completed Lab Status: Final result Updated: 01/06/22 0058    Specimen: Urine, Midstream      Streptococcus pneumoniae antigen Positive     Comment: False positive results may occur for this assay in urine following vaccination. Cross reactivity may occur with Streptococcus mitis. This test should be used in conjuction with culture and other methods to aid in the diagnosis of possible infection with Streptococcus pneumoniae.       Histoplasma [place] by [employer]iffusion [[id number]] Collected: 01/05/22 1614    Order Status: Sent Lab Status: In process Updated: 01/05/22 1621    Specimen: Blood from Line, venous     Blastomyces Agn Quant EIA Blood [[id number]] Collected: 01/05/22 1614    Order Status: Sent Lab Status: In process Updated: 01/05/22 1621    Specimen: Blood from Line, venous     Respiratory Aerobic Bacterial Culture with Gram Stain     Order Status: Canceled Lab Status: No result     Specimen: Sputum from Expectorate     Blood Culture Peripheral Blood [[id number]]  (Abnormal) Collected: 01/05/22 1527    Order Status: Completed Lab Status: Preliminary result Updated: 01/06/22 0846    Specimen: Peripheral Blood      Culture Positive on the 1st day of incubation      Gram positive cocci in pairs     Comment: 2 of 2 bottles       Blood Culture Peripheral Blood [[id number]]  (Abnormal) Collected: 01/05/22 1452    Order Status: Completed Lab Status: Preliminary result Updated: 01/06/22 0535    Specimen: Peripheral Blood      Culture Positive on the 1st day of incubation      Gram positive cocci in pairs and chains     Comment: 1 of 2 bottles       Verigene GP Panel [[id number]]  (Abnormal) Collected: 01/05/22 1452    Order Status: Completed Lab Status: Final result Updated: 01/06/22 0821    Specimen: Peripheral Blood      Staphylococcus species Not Detected     Staphylococcus aureus Not Detected      Staphylococcus epidermidis Not Detected     Staphylococcus lugdunensis Not Detected     Enterococcus faecalis Not Detected     Enterococcus faecium Not Detected     Streptococcus agalactiae Not Detected     Streptococcus anginosus group Not Detected     Streptococcus pneumoniae Detected     Comment: Positive for Streptococcus pneumoniae by [employer] multiplex nucleic acid test. Note: Streptococcus mitis group is known to cross-react with Streptococcus pneumoniae target. Correlation with culture results and clinical presentation is necessary. Final identification and antimicrobial susceptibility testing will be verified by standard methods.        Streptococcus pyogenes Not Detected     Listeria species Not Detected    Narrative:      Specimen tested with Verigene multiplex, gram-positive blood culture nucleic acid test for the following targets: Staphylococcus aureus, Staphylococcus epidermidis, Staphylococcus lugdunensis, other Staphylococcus species, Enterococcus faecalis, Enterococcus faecium, Streptococcus species, Streptococcus agalactiae, Streptococcus anginosus group, Streptococcus pneumoniae, Streptococcus pyogenes, Listeria species, mecA (methicillin resistance), and Marquis/vanB (vancomycin resistance).            Recent Labs   Lab Test 22  1647   URINEPH 6.0   NITRITE Negative   LEUKEST Negative   WBCU 3                         Imaging: Echo Complete    Result Date: 2022  [id number]^AJCK^DIANA^S  [facility],[place] [facility] [address] [place] [id number]  Name: ARAVIND MENDEZ MRN: [id number] : 1980 Study Date: 2022 07:11 AM Age: 41 yrs Gender: Male Patient Location: [facility] [place] Reason For Study: Shock Ordering Physician: DIANA SANTIAGO Referring Physician: JEAN-CLAUDE NEAL Performed By: Jaylyn Ricks RDCS  BSA: 2.0 m2 Height: 71 in Weight: 172 lb HR: 139 BP: 139/102 mmHg  ______________________________________________________________________________ Procedure Echocardiogram with two-dimensional, color and spectral Doppler performed. Contrast Optison. Optison (NDC #7039-6804-89) given intravenously. Patient was given 5 ml mixture of 3 ml Optison and 6 ml saline. 4 ml wasted. ______________________________________________________________________________ Interpretation Summary The patient is in sinus tachycardia at 140 bpm during the study. Left ventricular function is decreased. The ejection fraction is 30-35% (moderately reduced). Moderate diffuse hypokinesis is present. The right ventricle is normal size. Global right ventricular function is mildly reduced. There are no significant valvular abnormalities. No pericardial effusion is present. IVC measures 1.3 cm despite mechanical ventilation suggesting low filling pressures.  There is no prior study for direct comparison. ______________________________________________________________________________ Left Ventricle Left ventricular size is normal. Relative wall thickness is increased consistent with concentric remodeling. Left ventricular function is decreased. The ejection fraction is 30-35% (moderately reduced). Diastolic function not assessed due to tachycardia. Moderate diffuse hypokinesis is present.  Right Ventricle The right ventricle is normal size. Global right ventricular function is mildly reduced.  Atria Both atria appear normal.  Mitral Valve The mitral valve is normal. Trace mitral insufficiency is present.  Aortic Valve Aortic valve is normal in structure and function. The aortic valve is tricuspid. On Doppler interrogation, there is no significant stenosis or regurgitation.  Tricuspid Valve The tricuspid valve is normal. Trace tricuspid insufficiency is present. The peak velocity of the tricuspid regurgitant jet is not obtainable.  Pulmonic Valve The pulmonic valve is normal. Trace pulmonic insufficiency is present.   Vessels The aorta root is normal. The thoracic aorta is normal. The pulmonary artery is normal. The patient is on the ventilator which interferres with the assessment of the RAP, however the IVC measures 1.3 cm suggesting low filling pressures.  Pericardium No pericardial effusion is present.  Compared to Previous Study There is no prior study for direct comparison.  Attestation I have personally viewed the imaging and agree with the interpretation and report as documented by the fellow, Earle Crocker, and/or edited by me. ______________________________________________________________________________ MMode/2D Measurements & Calculations IVSd: 1.1 cm LVIDd: 4.8 cm LVIDs: 3.9 cm LVPWd: 1.2 cm FS: 19.3 % LV mass(C)d: 204.3 grams LV mass(C)dI: 103.3 grams/m2 Ao root diam: 3.8 cm asc Aorta Diam: 3.7 cm LVOT diam: 2.4 cm LVOT area: 4.5 cm2  EF(MOD-bp): 32.0 % LA Volume (BP): 56.3 ml  LA Volume Index (BP): 28.4 ml/m2 RWT: 0.50  Doppler Measurements & Calculations PA acc time: 0.10 sec  ______________________________________________________________________________ Report approved by: Bonita Pineda 01/06/2022 10:00 AM       XR Chest Port 1 View    Result Date: 1/6/2022  EXAM: XR CHEST PORT 1 VIEW  1/6/2022 1:20 AM HISTORY:  Endotracheal tube positioning   COMPARISON:  None FINDINGS: Endotracheal tube tip terminates in the mid thoracic trachea. Right arm PICC tip projects over the right atrium. No pneumothorax or pleural effusion. Patchy pulmonary opacity in the right lower lung. Visualized upper abdomen is unremarkable. No acute osseous abnormality. Enteric tube courses below the field of view     IMPRESSION: 1. Endotracheal tube tip projects in the mid thoracic trachea approximately 2.5 cm from the jan. Additional support devices as described above. 2. Patchy confluent appearing pulmonary opacity in the right lower lung suspicious for infection. I have personally reviewed the examination and  initial interpretation and I agree with the findings. TALISHA JUAREZ MD   SYSTEM ID:  [id number]    XR Chest Port 1 View    Result Date: 1/5/2022  EXAM: XR CHEST PORT 1 VIEW LOCATION: [facility] DATE/TIME: 1/5/2022 10:11 PM INDICATION: Intubation. COMPARISON: CT 1/5/2022. FINDINGS: ET tube 4.5 cm above the jan. NG tube passes into the stomach. Right PICC with tip in the distal SVC. No pneumothorax. The heart size is normal. There are again bilateral pulmonary infiltrates, right greater than left.     IMPRESSION: ET tube 4.5 cm above the jan.    XR Abdomen Port 1 View    Result Date: 1/6/2022  Exam: XR ABDOMEN PORT 1 VIEWS, 1/6/2022 1:21 AM Indication: og placement Comparison: None Findings: AP portable supine view of the abdomen. Enteric tube tip and sidehole project over the stomach. Nonobstructive bowel gas pattern. No pneumatosis or portal venous gas. Patchy opacities in the right greater than left lung bases. No acute osseous abnormality.     Impression: Enteric tube tip and sidehole project over the stomach. I have personally reviewed the examination and initial interpretation and I agree with the findings. TALISHA JUAREZ MD   SYSTEM ID:  [id number]    CT Chest Pulmonary Embolism w Contrast    Result Date: 1/5/2022  EXAM: CT CHEST PULMONARY EMBOLISM W CONTRAST LOCATION: [facility] DATE/TIME: 1/5/2022 2:56 PM INDICATION: Shortness of breath. Concern for pulmonary emboli. COMPARISON: Chest x-ray 07/30/2020 TECHNIQUE: CT chest pulmonary angiogram during arterial phase injection of IV contrast. Multiplanar reformats and MIP reconstructions were performed. Dose reduction techniques were used. CONTRAST: 100 mL Isovue-370 FINDINGS: ANGIOGRAM CHEST: Pulmonary arteries are normal caliber and negative for pulmonary emboli. Thoracic aorta is negative for dissection. No CT evidence of right heart strain. LUNGS AND PLEURA: Severe pneumonia with some patchy  groundglass opacities along with some very dense consolidations in both lower lobes. Slightly atypical for COVID but still consistent with severe COVID. MEDIASTINUM/AXILLAE: No lymphadenopathy. CORONARY ARTERY CALCIFICATION: None. UPPER ABDOMEN: Moderate diffuse hepatic steatosis. MUSCULOSKELETAL: Normal.     IMPRESSION: 1.  Severe pneumonia with some patchy groundglass infiltrates but also some very dense consolidative infiltrates most prominent in the lower lobes posteriorly. A superimposed bacterial pneumonia or aspiration pneumonia should be considered although severe COVID pneumonia could have this appearance pattern is slightly atypical. 2.  Negative for pulmonary emboli.

## 2022-01-06 NOTE — PROGRESS NOTES
[facility] Medicine Service brief transfer note    41-year-old male presented to hospital with acute hypoxic respiratory failure.  Now thought to have sepsis due to pneumonia with initial lactic acid of 10.5.  White blood count now 2600.  He is on IV piperacillin-tazobactam and vancomycin.    Shortly after admission he required high flow nasal cannula oxygen and despite that has had respiratory rate between 40 and 50.    Discussed patient with Dr. Grant receiving physician at [facility] ICU.    Also discussed patient with Dr. Pickett in the emergency room where patient currently is boarding and patient will be intubated prior to transport and also PICC line will be placed.    History significant for Covid in November for which she recovered without apparently any significant complications.  Also tobacco use disorder and GERD    Magnesium is 1.3 and will give 4 g of IV magnesium    Potassium 3.4 we will add potassium to his maintenance IV fluids and place him on high replacement protocol    Plan: Transfer to [facility] ICU when transport available.  I discussed patient with Dr. Salmeron receiving ICU physician'    Dusty Segovia MD  Hospitalist cross cover physician  [facility] Medicine Service                   lmom informing that rx is at

## 2022-01-06 NOTE — PLAN OF CARE
ICU End of Shift Summary. See flowsheets for vital signs and detailed assessment.    Changes this shift: Patient remains intubated on CMV settings: 40%/500/22/5. RR decreased from 28 to 22 d/t most recent gas. RASS -3. Sedation for comfort: fentanyl @100, propofol @75, versed weaned/shut off @1800. Sinus tach all day (110-140) Tmax 103.1. Packed with ice, given tylenol x2, reduced to 98.1. LS coarse, diminished. Brown secretions out ETT. 2x liquid BM this shift. Good UO through [facility].     Plan: Continue with plan of care, adjust as needed.      Problem: Adult Inpatient Plan of Care  Goal: Plan of Care Review  Outcome: No Change     Problem: Adult Inpatient Plan of Care  Goal: Patient-Specific Goal (Individualized)  Outcome: No Change     Problem: Adult Inpatient Plan of Care  Goal: Absence of Hospital-Acquired Illness or Injury  Outcome: No Change

## 2022-01-06 NOTE — ED PROVIDER NOTES
EMERGENCY DEPARTMENT SIGN OUT NOTE        ED COURSE AND MEDICAL DECISION MAKING  Patient was signed out to me by Dr Savannah Freitas at 1600    In brief, Brent Lau is a 41 year old male who initially presented to the emergency department for evaluation of fevers chills cough and shortness of breath.  Arrived to the emergency department in respiratory distress with tachypnea hypoxia and tachycardia.  Work-up in the emergency department notable for severe pneumonia on CT scan dense infiltrates in the posterior lower lobes, negative Covid testing, severe sepsis.  Received broad-spectrum antibiotics.  PICC line placed.  At the time of signout patient was admitted to the hospitalist.  Boarding in the emergency department pending placement in an inpatient bed.    21:18 PM  Asked to become involved in patient's care as he was felt to be decompensating from a respiratory status.  I had a discussion with the nurse and went and assessed the patient.  He was on 85% FiO2 high flow nasal cannula.  This was keeping his oxygen saturations around 90% but it was noted with even minimal movement he would desaturate.  Patient was awake alert and talking but had significant work of breathing with a respiratory rate estimated to be in the mid 40s.  He was persistently tachycardic to the 140s he was maintaining his blood pressure.  It sounds as though a bed has become available at [facility] patient will need ICU status given his severe pneumonia and respiratory dysfunction.  We made the decision at this point while we were working on securing the transfer and admission to transfer the patient to [facility] and send off a repeat ABG to reassess his respiratory function.  Considerations for intubation given his severe hypoxia and work of breathing.    10:20 PM  Lactic acid persistently elevated but slowly improving now down to 7.2.  He has received his 30 cc per/kg bolus of fluids and has ongoing fluid resuscitation.  Broad-spectrum  antibiotics have been administered.  Patient did not feel significantly changed in terms of his work of breathing on the BiPAP.  His gas obtained just prior to BiPAP was trending in a negative direction with pH dropping now down to 7.28.  We are anticipating transfer to the emergency [place] to the ICU there for the patient's medical issues and respiratory failure.  I made the decision at that point that the safest plan of care for this patient was to secure his airway prior to transport as I felt that overall he was heading towards mechanical ventilation given the severity of his  medical condition, his respiratory failure, his work of breathing, his worsening gas.  The patient was intubated without difficulty utilizing an 8-0.  Portable chest x-ray obtained and is pending at this time for placement.  Bilateral breath sounds post intubation patient tolerating well and started on a propofol drip he is maintaining his blood pressures if those drift in a negative direction would consider Levophed.  Transport has been called and awaiting EMS transfer to the [facility] for placement.    11:21 PM  Patient did have some difficulty with sedation post intubation.  He is currently maxed out on propofol.  Blood pressure did drift down with his sedation so we have Levophed at the bedside to initiate if his map drop below 65.  Received fentanyl and Ativan in addition to the propofol.  Continuing to monitor.  Portable chest x-ray completed.  Patient's O2 is normal post intubation.  Bilateral breath sounds.  Overall appearing stable for EMS transport and anticipate their arrival shortly with update if any change in clinical status prior to transfer.    FINAL IMPRESSION    1. Acute respiratory failure with hypoxia (H)    2. Lactic acidosis    3. High anion gap metabolic acidosis    4. Sinus tachycardia    5. Tachypnea    6. Pneumonia of both lower lobes due to infectious organism        ED MEDS  Medications   lidocaine  1 % 0.1-1 mL (has no administration in time range)   lidocaine (LMX4) cream (has no administration in time range)   sodium chloride (PF) 0.9% PF flush 3 mL (has no administration in time range)   sodium chloride (PF) 0.9% PF flush 3 mL (has no administration in time range)   acetaminophen (TYLENOL) tablet 975 mg (975 mg Oral Given 1/5/22 2048)   ondansetron (ZOFRAN-ODT) ODT tab 4 mg (has no administration in time range)     Or   ondansetron (ZOFRAN) injection 4 mg (has no administration in time range)   prochlorperazine (COMPAZINE) injection 10 mg (has no administration in time range)     Or   prochlorperazine (COMPAZINE) tablet 10 mg (has no administration in time range)     Or   prochlorperazine (COMPAZINE) suppository 25 mg (has no administration in time range)   docusate sodium (COLACE) capsule 100 mg (has no administration in time range)   pantoprazole (PROTONIX) IV push injection 40 mg (40 mg Intravenous Given 1/5/22 1740)   piperacillin-tazobactam (ZOSYN) 3.375 g vial to attach to  mL bag (3.375 g Intravenous Given 1/5/22 2259)   vancomycin 1250 mg in 0.9% NaCl 250 mL intermittent infusion 1,250 mg (has no administration in time range)   nicotine Patch in Place (1 each Transdermal Patch in Place 1/5/22 1743)   nicotine (NICODERM CQ) 21 MG/24HR 24 hr patch 1 patch (1 patch Transdermal Patch/Med Applied 1/5/22 1741)   OLANZapine zydis (zyPREXA) ODT tab 5-10 mg (has no administration in time range)     Or   haloperidol lactate (HALDOL) injection 2.5-5 mg (has no administration in time range)   flumazenil (ROMAZICON) injection 0.2 mg (has no administration in time range)   melatonin tablet 5 mg (has no administration in time range)   diazepam (VALIUM) tablet 10 mg (has no administration in time range)     Or   diazepam (VALIUM) injection 5-10 mg (has no administration in time range)   thiamine (B-1) tablet 100 mg (100 mg Oral Given 1/5/22 1743)   folic acid (FOLVITE) tablet 1 mg (1 mg Oral Given 1/5/22  1743)   multivitamin w/minerals (THERA-VIT-M) tablet 1 tablet (1 tablet Oral Given 1/5/22 1744)   lidocaine 1 % 0.1-5 mL (has no administration in time range)   lidocaine (LMX4) cream (has no administration in time range)   sodium chloride (PF) 0.9% PF flush 10-40 mL (has no administration in time range)   magnesium sulfate 4 g in 50 mL sterile water (premade) (4 g Intravenous New Bag 1/5/22 2152)   0.9% sodium chloride + KCl 20 mEq/L infusion ( Intravenous New Bag 1/5/22 2153)   propofol (DIPRIVAN) infusion (70 mcg/kg/min × 74.8 kg Intravenous Rate/Dose Change 1/5/22 2245)   norepinephrine (LEVOPHED) 4 mg in  mL infusion PREMIX (0.03 mcg/kg/min × 74.8 kg Intravenous New Bag 1/5/22 2314)   0.9% sodium chloride BOLUS (0 mLs Intravenous Stopped 1/5/22 1617)   iopamidol (ISOVUE-370) solution 100 mL (62 mLs Intravenous Given 1/5/22 1510)   piperacillin-tazobactam (ZOSYN) 3.375 g vial to attach to  mL bag (0 g Intravenous Stopped 1/5/22 1602)   0.9% sodium chloride BOLUS (0 mLs Intravenous Stopped 1/5/22 1705)   0.9% sodium chloride BOLUS (0 mLs Intravenous Stopped 1/5/22 1725)   vancomycin 1500 mg in 0.9% NaCl 250 ml intermittent infusion 1,500 mg (0 mg Intravenous Stopped 1/5/22 1808)   succinylcholine (ANECTINE) injection 100 mg (100 mg Intravenous Given 1/5/22 2200)   etomidate (AMIDATE) injection 30 mg (30 mg Intravenous Given 1/5/22 2155)   fentaNYL (PF) (SUBLIMAZE) injection 100 mcg (100 mcg Intravenous Given 1/5/22 2218)   fentaNYL (PF) (SUBLIMAZE) injection 100 mcg (100 mcg Intravenous Given 1/5/22 2248)   LORazepam (ATIVAN) injection 2 mg (2 mg Intravenous Given 1/5/22 2301)       LAB  Labs Ordered and Resulted from Time of ED Arrival to Time of ED Departure   BASIC METABOLIC PANEL - Abnormal       Result Value    Sodium 138      Potassium 3.4 (*)     Chloride 94 (*)     Carbon Dioxide (CO2) 15 (*)     Anion Gap 29 (*)     Urea Nitrogen 12      Creatinine 0.93      Calcium 8.6      Glucose 93       GFR Estimate >90     BLOOD GAS ARTERIAL - Abnormal    pH Arterial 7.38      pCO2 Arterial 26 (*)     pO2 Arterial 69 (*)     Bicarbonate Arterial 18 (*)     O2 Sat, Arterial 91.2 (*)     Oxyhemoglobin 89.6 (*)     Base Excess/Deficit (+/-) -10.1      Ventilation Mode HFNC      FIO2 0.85      Flow 60.0      Sample Stabilized Temperature 37.0     CBC WITH PLATELETS AND DIFFERENTIAL - Abnormal    WBC Count 6.3      RBC Count 4.16 (*)     Hemoglobin 13.3      Hematocrit 39.5 (*)     MCV 95      MCH 32.0      MCHC 33.7      RDW 13.2      Platelet Count 69 (*)    LACTIC ACID WHOLE BLOOD - Abnormal    Lactic Acid 10.5 (*)    BLOOD GAS VENOUS - Abnormal    pH Venous 7.37      pCO2 Venous 26 (*)     pO2 Venous 59 (*)     Bicarbonate Venous 17 (*)     Base Excess/Deficit (+/-) -10.2      Oxyhemoglobin Venous 81.3 (*)     O2 Sat, Venous 83.0 (*)    DIFFERENTIAL - Abnormal    % Neutrophils 79      % Lymphocytes 16      % Monocytes 2      % Eosinophils 0      % Basophils 0      % Metamyelocytes 3      Absolute Neutrophils 5.0      Absolute Lymphocytes 1.0      Absolute Monocytes 0.1      Absolute Eosinophils 0.0      Absolute Basophils 0.0      Absolute Metamyelocytes 0.2 (*)     RBC Morphology Confirmed RBC Indices      Platelet Assessment        Value: Automated Count Confirmed. Platelet morphology is normal.   CRP INFLAMMATION - Abnormal    CRP 39.4 (*)    PROCALCITONIN - Abnormal    Procalcitonin 16.40 (*)    POTASSIUM - Abnormal    Potassium 3.4 (*)    MAGNESIUM - Abnormal    Magnesium 1.3 (*)    LACTIC ACID WHOLE BLOOD - Abnormal    Lactic Acid 9.2 (*)    LACTIC ACID WHOLE BLOOD - Abnormal    Lactic Acid 7.2 (*)    FIBRINOGEN ACTIVITY - Abnormal    Fibrinogen Activity 767 (*)    D DIMER QUANTITATIVE - Abnormal    D-Dimer Quantitative 15.87 (*)    ROUTINE UA WITH MICROSCOPIC REFLEX TO CULTURE - Abnormal    Color Urine Light Orange (*)     Appearance Urine Clear      Glucose Urine 30  (*)     Bilirubin Urine 0.5 mg/dL (*)      Ketones Urine 60  (*)     Specific Gravity Urine 1.045 (*)     Blood Urine 0.2 mg/dL (*)     pH Urine 6.0      Protein Albumin Urine 600  (*)     Urobilinogen Urine 6.0 (*)     Nitrite Urine Negative      Leukocyte Esterase Urine Negative      Bacteria Urine Few (*)     Mucus Urine Present (*)     RBC Urine 2      WBC Urine 3      Squamous Epithelials Urine <1      Hyaline Casts Urine 5 (*)    MAGNESIUM - Abnormal    Magnesium 1.3 (*)    CBC WITH PLATELETS AND DIFFERENTIAL - Abnormal    WBC Count 2.4 (*)     RBC Count 3.73 (*)     Hemoglobin 11.9 (*)     Hematocrit 35.7 (*)     MCV 96      MCH 31.9      MCHC 33.3      RDW 13.3      Platelet Count 54 (*)    ETHYL ALCOHOL LEVEL - Abnormal    Alcohol, Blood 141 (*)    HEPATIC FUNCTION PANEL - Abnormal    Bilirubin Total 1.2 (*)     Bilirubin Direct 0.8 (*)     Protein Total 6.1      Albumin 2.3 (*)     Alkaline Phosphatase 56       (*)     ALT 43     DIFFERENTIAL - Abnormal    % Neutrophils 70      % Lymphocytes 18      % Monocytes 1      % Eosinophils 0      % Basophils 0      % Metamyelocytes 9      % Myelocytes 2      Absolute Neutrophils 1.7      Absolute Lymphocytes 0.4 (*)     Absolute Monocytes 0.0      Absolute Eosinophils 0.0      Absolute Basophils 0.0      Absolute Metamyelocytes 0.2 (*)     Absolute Myelocytes 0.0      RBC Morphology Confirmed RBC Indices      Platelet Assessment        Value: Automated Count Confirmed. Platelet morphology is normal.    Reactive Lymphocytes Present (*)    BLOOD GAS ARTERIAL - Abnormal    pH Arterial 7.28 (*)     pCO2 Arterial 33 (*)     pO2 Arterial 89      Bicarbonate Arterial 16 (*)     O2 Sat, Arterial 94.2 (*)     Oxyhemoglobin 93.0 (*)     Base Excess/Deficit (+/-) -11.3      Ventilation Mode BIPAP      FIO2 80      PSV 8      Peep 6      Sample Stabilized Temperature 37.0     POTASSIUM - Abnormal    Potassium 3.3 (*)    INFLUENZA A/B & SARS-COV2 PCR MULTIPLEX - Normal    Influenza A PCR Negative       Influenza B PCR Negative      SARS CoV2 PCR Negative     TROPONIN I - Normal    Troponin I 0.02     B-TYPE NATRIURETIC PEPTIDE ( EAST ONLY) - Normal    BNP 35     INR - Normal    INR 1.08     PARTIAL THROMBOPLASTIN TIME - Normal    aPTT 37     TSH WITH FREE T4 REFLEX - Normal    TSH 0.40     PHOSPHORUS - Normal    Phosphorus 2.6     RETICULOCYTE COUNT - Normal    % Reticulocyte 1.1      Absolute Reticulocyte 0.040     HISTOPLASMA ANTIGEN QUANTITATIVE BY EIA   BLASTOMYCES ANTIBODY ID   MORPHOLOGY TRACKING   BLOOD MORPHOLOGY PATHOLOGIST REVIEW   BLOOD CULTURE   BLOOD CULTURE   RESPIRATORY AEROBIC BACTERIAL CULTURE   LEGIONELLA PNEUMOPHILA URINARY ANTIGEN   STREPTOCOCCUS PNEUMONIAE ANTIGEN   HISTOPLASMA ANTIBODY BY IMMUNODIFFUSION   BLASTOMYCES AGN QUANT EIA BLOOD   LAB BLOOD MORPHOLOGY PATHOLOGIST REVIEW       RADIOLOGY    CT Chest Pulmonary Embolism w Contrast   Final Result   IMPRESSION:   1.  Severe pneumonia with some patchy groundglass infiltrates but also some very dense consolidative infiltrates most prominent in the lower lobes posteriorly. A superimposed bacterial pneumonia or aspiration pneumonia should be considered although    severe COVID pneumonia could have this appearance pattern is slightly atypical.      2.  Negative for pulmonary emboli.      XR Chest Port 1 View    (Results Pending)     [facility]    -Intubation    Date/Time: 1/5/2022 11:22 PM  Performed by: Gabriel Alexander MD  Authorized by: Gabriel Alexander MD     Risks, benefits and alternatives discussed.    ED EVALUATION:      Assessment Time: 1/5/2022 10:22 PM      I have performed an Emergency Department Evaluation including taking a history and physical examination, this evaluation will be documented in the electronic medical record for this ED encounter.      ASA Class: Class 4- Severe systemic disease, acute unstable problems    Mallampati: Grade 2- soft palate, base of uvula, tonsillar pillars, and  portion of posterior pharyngeal wall visible    UNIVERSAL PROTOCOL   Site Marked: NA  Prior Images Obtained and Reviewed:  Yes  Required items: Required blood products, implants, devices and special equipment available    Patient identity confirmed:  Verbally with patient  Patient was reevaluated immediately before administering moderate or deep sedation or anesthesia  Confirmation Checklist:  Patient's identity using two indicators  Time out: Immediately prior to the procedure a time out was called    Universal Protocol: the Joint Commission Universal Protocol was followed    Preparation: Patient was prepped and draped in usual sterile fashion      PRE-PROCEDURE DETAILS     Patient status:  Altered mental status    Paralytics:  Succinylcholine        SEDATION  Patient Sedated: Yes    Sedation Type:  Deep  Sedation:  Etomidate  Vital signs: Vital signs monitored during sedation    PROCEDURE DETAILS     Preoxygenation:  Bag valve mask    CPR in progress: no      Intubation method:  Oral    Oral intubation technique:  Video-assisted    Tube size (mm):  8.0    Tube type:  Double lumen    Number of attempts:  1    Tube visualized through cords: yes      PLACEMENT ASSESSMENT     ETT to lip:  25    Tube secured with:  Adhesive tape and ETT melo    Breath sounds:  Equal    Placement verification: chest rise, CXR verification, equal breath sounds, ETCO2 detector and tube exhalation      CXR findings:  ETT in proper place    PROCEDURE    Patient Tolerance:  Patient tolerated the procedure well with no immediate complications  Length of time physician/provider present for 1:1 monitoring during sedation: 15      Critical Care     Performed by: Dr Savannah Freitas  Total critical care time: 40 minutes  Critical care was necessary to treat or prevent imminent or life-threatening deterioration of the following conditions:  Acute hypoxemic respiratory failure due to severe pneumonia and sepsis requiring intubation.  Critical care  was time spent personally by me on the following activities: development of treatment plan with patient or surrogate, discussions with consultants, examination of patient, evaluation of patient's response to treatment, obtaining history from patient or surrogate, ordering and performing treatments and interventions, ordering and review of laboratory studies, ordering and review of radiographic studies, re-evaluation of patient's condition and monitoring for potential decompensation.  Critical care time was exclusive of separately billable procedures and treating other patients.      Gabriel Alexander MD  Emergency Medicine  [facility] EMERGENCY ROOM  [address] [place] [id number] [phone]       Gabriel Alexander MD  01/05/22 3589

## 2022-01-06 NOTE — PROGRESS NOTES
CLINICAL NUTRITION SERVICES - ASSESSMENT NOTE     Nutrition Prescription    RECOMMENDATIONS FOR MDs/PROVIDERS TO ORDER:  --Discussed recommendation for high electrolyte replacement protocol.     Malnutrition Status:    Moderate malnutrition in the context of acute on chronic illness    Recommendations already ordered by Registered Dietitian (RD):  TF:   --Enteral access: OGT  --GOAL: Osmolite 1.5 Diogo @ goal of 60ml/hr (1440ml/day) +3 Prosource will provide: 2280 kcals (29 kcal/kg), 123 g PRO (1.6 g/kg), 1097 ml free H20, 293 g CHO, and 0 g fiber daily.  --Start TF @ 10 ml/hr and advance by 10 ml q 8 hrs until goal rate.  --Do not start or advance TF rate unless K+ >3.0, Mg++ > 1.5,  and Phos > 1.9.  --Minimum 30 ml q 4 hrs water flushes for tube patency.  --If gastric enteral access: HOB > 30 degrees  --Continue MVI, Thiamine, and Folic acid as ordered.    Future/Additional Recommendations:  --TF tolerance, lytes, advancement.  --Weight trends.  --Propofol.      REASON FOR ASSESSMENT  Brent Lau is a/an 41 year old male assessed by the dietitian for Provider Order - Registered Dietitian to Assess and Order TF per Medical Nutrition Therapy Protocol    NUTRITION HISTORY  PMH history of tobacco use, alcohol use, and GERD who was admitted to the MICU as a transfer from OSH on 1/6/22 due to shock and acute hypoxic respiratory failure likely in the setting of pneumonia. Was COVID+ >1 month ago. Found to have Strep PNA. Pt was intubated @ OSH, OGT in place.     CURRENT NUTRITION ORDERS  Diet: NPO    LABS  K+ 4.6 (WNL <- 3.2)  Cr 0.63 (L)  Phos 1.8 (L <- 2.3)  Direct bili 0.8 (H)  Ketones 1.6 (H)  Lactic acid 6.0 (H <- 4.4 <- 7.2 <- 9.2 <- 10.5)  BG     MEDICATIONS  Folic acid  Thera-vit  Senokot BID  Thiamine 100 mg daily -> changed to high dose thiamine protocol  Fentanyl  Versed  Norepi (off)  Propofol    ANTHROPOMETRICS  Height: 0 cm (Data Unavailable)   Ht Readings from Last 2 Encounters:   07/25/19 1.816 m  "(5' 11.5\")   07/11/18 1.816 m (5' 11.5\")     Most Recent Weight: 78.3 kg (172 lb 9.6 oz)    IBW: 79.5 kg  BMI: Normal BMI  Weight History:   Wt Readings from Last 30 Encounters:   01/06/22 78.3 kg (172 lb 9.6 oz)   01/05/22 74.8 kg (165 lb)   11/06/20 74.8 kg (165 lb)   07/25/19 89.1 kg (196 lb 8 oz)   07/11/18 96.2 kg (212 lb)     Dosing Weight: 78 kg actual weight    ASSESSED NUTRITION NEEDS  Estimated Energy Needs: 9850-3647+ kcals/day (25 - 30+ kcals/kg)  Justification: Increased needs and Vented  Estimated Protein Needs: + grams protein/day (1.2 - 1.5+ grams of pro/kg)  Justification: Increased needs  Estimated Fluid Needs: (1 mL/kcal)   Justification: Maintenance and Per provider pending fluid status    PHYSICAL FINDINGS  See malnutrition section below.    MALNUTRITION  % Intake: Unable to assess -> suspect inadequate 2/2 etoh use  % Weight Loss: Unable to assess -> no recent weights per Care Everywhere  Subcutaneous Fat Loss: Facial region:  mild and Upper arm:  moderate  Muscle Loss: Temporal:  mild and Thoracic region (clavicle, acromium bone, deltoid, trapezius, pectoral):  Mild to moderate  Fluid Accumulation/Edema: None noted  Malnutrition Diagnosis: Moderate malnutrition in the context of acute on chronic illness    NUTRITION DIAGNOSIS  Inadequate oral intake related to mechanical ventilation inhibiting ability to take nutrition PO as evidenced by NPO status x at least 1 day requiring the start of enteral nutrition to meet 100% of needs.       INTERVENTIONS  Implementation  Collaboration with other providers  Enteral Nutrition - Initiate     Goals  Total avg nutritional intake to meet a minimum of 25 kcal/kg and 1.2-1.5+ g PRO/kg daily (per dosing wt 78 kg).     Monitoring/Evaluation  Progress toward goals will be monitored and evaluated per protocol.    Kaylen Segovia, MS, RD, LD, [facility] pager: [phone]  ASCOM: [id number]    "

## 2022-01-06 NOTE — PROGRESS NOTES
Admitted/transferred from: [facility]   Reason for admission/transfer: Respiratory failure  Patient status upon admission/transfer: Patient arrived intubated, sedated on 50 of propofol, levo running at 0.1. Reported alcohol abuse.  Interventions:D50 given to treat hypoglycemia, 1000 mL of LR given x 2. Labs sent, ABX started. Chest XRAY.   Plan: Monitor labs, respiratory status and labs. Monitor for signs of withdrawal.   2 RN skin assessment: completed by Cookie HORNER And Nelda HERR    Result of skin assessment and interventions/actions: Cracked and peeling feet, cracked lips.  Height, weight, drug calc weight: Done  Patient belongings (see Flowsheet - Adult Profile for details): remain with patient (clothes, phone, pocket knife.   MDRO education (if applicable): n/a

## 2022-01-06 NOTE — PROGRESS NOTES
Called to assess pt.  Upon arrival, pt on 15L Oxymask, O2 sats upper 80's.  Pt placed on HFNC 60L/85%.  ABG drawn, results in Epic.  Later called to place pt on BIPAP, settings as follows:  14/6,16, 80%, RR upper 40's.  ABG drawn, results in Epic.  Pt then intubated ETT 8.0, secured 25 @ teeth.  VENT settings as follows:  AC 24/400/+10/100%  BS cs, sxn for moderate bloody.

## 2022-01-06 NOTE — PHARMACY-VANCOMYCIN DOSING SERVICE
"Pharmacy Vancomycin Initial Note  Date of Service 2022  Patient's  1980  41 year old, male    Indication: Community Acquired Pneumonia and Sepsis    Current estimated CrCl = CrCl cannot be calculated (Unknown ideal weight.).    Creatinine for last 3 days  2022:  1:57 PM Creatinine 0.93 mg/dL  2022: 12:51 AM Creatinine 0.68 mg/dL    Recent Vancomycin Level(s) for last 3 days  No results found for requested labs within last 72 hours.      Vancomycin IV Administrations (past 72 hours)                   vancomycin 1500 mg in 0.9% NaCl 250 ml intermittent infusion 1,500 mg (mg) 1,500 mg New Bag 22 1603                Nephrotoxins and other renal medications (From now, onward)    Start     Dose/Rate Route Frequency Ordered Stop    22 0600  piperacillin-tazobactam (ZOSYN) 4.5 g vial to attach to  mL bag        Note to Pharmacy: For [facility], [facility] and [facility]: For Zosyn-naive patients, use the \"Zosyn initial dose + extended infusion\" order panel.    4.5 g  over 30 Minutes Intravenous EVERY 6 HOURS 22 0043      22 0400  vancomycin 1250 mg in 0.9% NaCl 250 mL intermittent infusion 1,250 mg         1,250 mg  over 90 Minutes Intravenous EVERY 12 HOURS 22 0141      22 0100  norepinephrine (LEVOPHED) 16 mg in  mL infusion MAX CONC CENTRAL LINE         0.01-0.6 mcg/kg/min × 74.8 kg (Dosing Weight)  0.7-42.1 mL/hr  Intravenous CONTINUOUS 22 0041            Contrast Orders - past 72 hours (72h ago, onward)            None          Insight[employer] Prediction of Planned Initial Vancomycin Regimen  Loading dose: 1500 mg IV given prior to transfer to [facility]  Regimen: 1250 mg IV every 12 hours.  Start time: 04:00 on 2022  Exposure target: AUC24 (range)400-600 mg/L.hr   AUC24,ss: 594 mg/L.hr  Probability of AUC24 > 400: 84 %  Ctrough,ss: 18.2 mg/L  Probability of Ctrough,ss > 20: 43 %  Probability of nephrotoxicity (Lodise LILIAN ): 14 %        Plan:  1. Continue " vancomycin  1250 mg IV q12h.   2. Vancomycin monitoring method: AUC  3. Vancomycin therapeutic monitoring goal: 400-600 mg*h/L  4. Pharmacy will check vancomycin levels as appropriate in 1-3 Days.    5. Serum creatinine levels will be ordered daily for the first week of therapy and at least twice weekly for subsequent weeks.      Kristofer Dominguez, CRYSTALH

## 2022-01-06 NOTE — ED NOTES
Pt currently talking on the phone with wife and kids. O2 sats dipped to 87%. Pulse is 152. Respiratory rate is still very elevated at 49 bpm

## 2022-01-06 NOTE — H&P
[facility]    History and Physical - MICU Night Service        Date of Admission:  (Not on file)    Assessment & Plan      Brent Lau is a 41 year old male with history of tobacco use, alcohol use, and GERD who was admitted to the MICU as a transfer from OSH on 1/6/22 due to shock and acute hypoxic respiratory failure likely in the setting of pneumonia.     PLAN    Neuro:  # Pain and Sedation  RAAS -3 to -4   - Fentanyl gtt w/boluses  - Versed gtt w/boluses   - Wean off propofol due to hypotension    # Alcohol use disorder  Per wife, patient drinks regularly up to 1 L of vodka a day. BAL elevated on OSH ED arrival. No charted or reported history of alcohol withdrawal/seizures.   - ciWA when extubated   - Folic acid, Thiamine, and Multivitamin Daily   - Chemical Dependency consult when extubated     Pulmonary:  # Acute Hypoxic Respiratory Failure  # Hx of Recent COVID infection  Patient with dense consolidative infiltrates in the lower lobes on CT c/f significant pneumonia. Given the significant alcohol use history, aspiration possible. Atypical look for a COVID pneumonia; however, on the differential.     - Infectious w/u as below  - Antibiotics as below     Ventilation Mode: CMV/AC  (Continuous Mandatory Ventilation/ Assist Control)  FiO2 (%): 60 %  Rate Set (breaths/minute): 20 breaths/min  Tidal Volume Set (mL): 500 mL  PEEP (cm H2O): 5 cmH2O  Oxygen Concentration (%): 60 %  Resp: 30      Cardiovascular:  # Shock   Likely in the setting of hypovolemia v distributive/vasoplegic v other. IVC on bedside U/S appears collapsible, could use more fluid resuscitation (2.25 L at OSH).   - MAP goal >65   - Pressors: Levophed, wean as able  - IVF: 1 L LR on arrival to ICU, could use more   - Monitor iCal (replete to ~4.4)   - ECHO in the AM     GI/Nutrition:  # Nutrition  - Nutrition consult for TF     # GERD  - PPI for stress ulcer prophylaxis     # Bowel Regimen   -  Senna scheduled  - Miralax PRN     Renal/Electrolytes:  # Metabolic Acidosis   # Alcoholic Ketosis  # Lactic Acidosis, improving   Adequate respiratory compensation. Lactate 10.5 on ED arrival, improved after some fluid resuscitation. Ketones elevated, likely in the setting of alcohol use. CK normal.    - Trend Lactate  - IVF, should help with lactate and ketosis   - CTM     # Hypokalemia  # Hypomagnesemia   - CTM, replete PRN    Infectious Disease:  # Sepsis   # c/f CAP    - Strep Pneumo, Legionella, Histo, Blast pending  - Blood cultures & Sputum culture pending  - Antimicrobials: Zosyn, Vancomycin + Azithromycin   - MRSA swab pending     # Recent COVID  Patient with COVID infection > 1 month ago. Tested negative on ED arrival; however, due to primary respiratory problem will retest and keep on precautions overnight.     Hematology:  # Acute Thrombocytopenia   # Acute Normocytic Anemia   Likely in the setting of some likely alcoholic liver disease and some suppression in the setting of acute infection. No acute signs of bleeding on admission.   - CTM  - Hb goal > 7     Endocrine:  No active issues     MSK:  No active issues     Skin:  No active issues         Diet:   NPO except for Meds   DVT Prophylaxis: Holding overnight  Villatoro Catheter: PRESENT, indication:    Fluids: IVF as above   Central Lines: PRESENT     Code Status:  Full Code     Clinically Significant Risk Factors Present on Admission           # Hypomagnesemia: Mg = 1.3 mg/dL (Ref range: 1.8 - 2.6 mg/dL) on admission, will replace as needed    # Thrombocytopenia: Plts = 54 10e3/uL (Ref range: 150 - 450 10e3/uL) on admission, will monitor for bleeding       Disposition Plan   Expected Discharge: Pending medical and clinical stability   Anticipated discharge location:  Awaiting care coordination huddle       The patient's care was discussed with the Attending Physician, Dr. Grant and Bedside Nurse.    Danis Mac MD  [facility] Night Service  M  "[facility]  Securely message with the [employer] Web Console (learn more here)  Text page via [employer] Paging/Directory    Please see sign in/sign out for up to date coverage information  ______________________________________________________________________    Chief Complaint    Dyspnea     History is obtained from the patient's spouse over the phone. Unable to obtain history from patient due to critical condition.     History of Present Illness   Brent Lau is a 41 year old male with a history of significant alcohol use, tobacco use, and GERD who presented to H ED with complaints of dyspnea.     Per the patient's spouse: The patient's family all had COVID just after Thanksgiving. She states that the patient was symptomatic (mild cough, rhinorrhea, and fatigue) for about 1 week. His symptoms then resolved and he returned to his usual daily activities without any concerns. Then this past weekend about 4-5 days ago, he began to feel fatigued and complained of a persistent mostly dry cough. Spouse says that the patient \"mostly just laid around because he did not have much energy\". She states that he did not really eat or drink much aside from about 1 L of vodka daily, which \"is his usual amount\". He states that he do not really complain of any other symptoms until this morning (1/5/22) when began to have increased shortness of breath which prompted her to check his oxygen saturations. When the saturations showed 79% on the home monitor, she decided to bring him in for evaluation. She does not report any recent travel or recent sick contacts for the patient.     In the ED: the patient was found to have a lactate ~10.5. CT Chest w/contrast was negative for PE but significant for dense lower lobe consolidations. Patient was given 2.25 L of IVF at OSH. He was initially started on HFNC but when he was unable to tolerate it, decision was made to intubate and transfer to the [facility] " ICU.     Review of Systems    Review of systems not obtained due to patient factors - critical condition    Past Medical History    I have reviewed this patient's medical history and updated it with pertinent information if needed.   Past Medical History:   Diagnosis Date     Acid reflux       Past Surgical History   I have reviewed this patient's surgical history and updated it with pertinent information if needed.  Past Surgical History:   Procedure Laterality Date     NO PAST SURGERIES       PICC TRIPLE LUMEN PLACEMENT  1/5/2022           Social History   I have reviewed this patient's social history and updated it with pertinent information if needed. Brent Lau  reports that he has been smoking cigarettes and cigarettes. He has quit using smokeless tobacco. Patient smokes about 1 pack/day and has been since he was aged 17 per wife. Patient drinks about 1 L vodka/day. His last drink was 11 am 1/5/22. He has no history of withdrawal or seizures.     Family History   I have reviewed this patient's family history and updated it with pertinent information if needed.  Family History   Problem Relation Age of Onset     Diabetes Mother      Hypertension Mother      No Known Problems Sister      No Known Problems Brother        Prior to Admission Medications   Cannot display prior to admission medications because the patient has not been admitted in this contact.     Allergies   No Active Allergies    Physical Exam   Vital Signs:     BP: (!) 135/94              Weight: 0 lbs 0 oz    General Appearance: Laying in bed. Sedated  Eyes: CONCEPCION. Conjunctivae clear.   HEENT: MMDry. Head atraumatic. Normocephalic  Respiratory: Mechanically ventilated. Coarse sounds at the bases  Cardiovascular: Tachycardic, regular rhythm. IVC collapsible on bedside U/S. Peripheral pulses intact.   GI: Soft, mild distension.   Genitourinary: Villatoro in place. Mild UOP.  Skin: Warm and dry. Dry excoriations on bilateral LEs.  Musculoskeletal: No  peripheral edema  Neurologic: Sedated    Data   Data reviewed today: I reviewed all medications, new labs and imaging results over the last 24 hours.     Recent Labs   Lab 01/06/22 0121 01/06/22 0100 01/06/22 0051 01/05/22 2138 01/05/22 1730 01/05/22  1357   WBC  --   --  1.5*  --  2.4* 6.3   HGB  --   --  11.0*  --  11.9* 13.3   MCV  --   --  98  --  96 95   PLT  --   --  44*  --  54* 69*   INR  --   --   --   --   --  1.08   NA  --   --  134  --   --  138   POTASSIUM  --   --  3.2* 3.3* 3.4* 3.4*   CHLORIDE  --   --  102  --   --  94*   CO2  --   --   --   --   --  15*   BUN  --   --   --   --   --  12   CR  --   --   --   --   --  0.93   ANIONGAP  --   --   --   --   --  29*   MARINA  --   --   --   --   --  8.6   * 41*  --   --   --  93   ALBUMIN  --   --   --   --  2.3*  --    PROTTOTAL  --   --   --   --  6.1  --    BILITOTAL  --   --   --   --  1.2*  --    ALKPHOS  --   --   --   --  56  --    ALT  --   --   --   --  43  --    AST  --   --   --   --  119*  --      Most Recent 3 CBC's:Recent Labs   Lab Test 01/06/22 0051 01/05/22 1730 01/05/22  1357   WBC 1.5* 2.4* 6.3   HGB 11.0* 11.9* 13.3   MCV 98 96 95   PLT 44* 54* 69*     Most Recent 3 BMP's:Recent Labs   Lab Test 01/06/22 0121 01/06/22 0100 01/06/22 0051 01/05/22 2138 01/05/22 1730 01/05/22  1357 11/02/20 2202 07/25/19  1414   NA  --   --  134  --   --  138 142 140   POTASSIUM  --   --  3.2* 3.3* 3.4* 3.4* 3.6 3.7   CHLORIDE  --   --  102  --   --  94* 103 104   CO2  --   --   --   --   --  15* 26 25   BUN  --   --   --   --   --  12 6* 12   CR  --   --   --   --   --  0.93 0.64* 1.03   ANIONGAP  --   --   --   --   --  29* 13 11   MARINA  --   --   --   --   --  8.6 8.5 10.1   * 41*  --   --   --  93 90 96     Most Recent 2 LFT's:Recent Labs   Lab Test 01/05/22  1730   *   ALT 43   ALKPHOS 56   BILITOTAL 1.2*     Most Recent 3 INR's:Recent Labs   Lab Test 01/05/22  1357   INR 1.08     Most Recent 3 Hemoglobins:Recent Labs    Lab Test 01/06/22  0051 01/05/22  1730 01/05/22  1357   HGB 11.0* 11.9* 13.3     Most Recent 3 BNP's:No lab results found.  Most Recent D-dimer:Recent Labs   Lab Test 01/05/22  1357   DD 15.87*     Most Recent 6 Bacteria Isolates From Any Culture (See EPIC Reports for Culture Details):No lab results found.  Most Recent Urinalysis:Recent Labs   Lab Test 01/05/22  1647   COLOR Light Orange*   APPEARANCE Clear   URINEGLC 30 *   URINEBILI 0.5 mg/dL*   URINEKETONE 60 *   SG 1.045*   UBLD 0.2 mg/dL*   URINEPH 6.0   PROTEIN 600 *   NITRITE Negative   LEUKEST Negative   RBCU 2   WBCU 3     Most Recent ABG:Recent Labs   Lab Test 01/05/22  2113   PH 7.28*   PO2 89   PCO2 33*   HCO3 16*   ANGELIA -11.3     Most Recent ESR & CRP:Recent Labs   Lab Test 01/05/22  1357   CRP 39.4*     Most Recent Anemia Panel:Recent Labs   Lab Test 01/06/22  0051 01/05/22  1730   WBC 1.5* 2.4*   HGB 11.0* 11.9*   HCT 30.6* 35.7*   MCV 98 96   PLT 44* 54*   RETICABSCT  --  0.040   RETP  --  1.1     Most Recent CPK:No lab results found.  Recent Results (from the past 24 hour(s))   CT Chest Pulmonary Embolism w Contrast    Narrative    EXAM: CT CHEST PULMONARY EMBOLISM W CONTRAST  LOCATION: [facility]  DATE/TIME: 1/5/2022 2:56 PM    INDICATION: Shortness of breath. Concern for pulmonary emboli.  COMPARISON: Chest x-ray 07/30/2020  TECHNIQUE: CT chest pulmonary angiogram during arterial phase injection of IV contrast. Multiplanar reformats and MIP reconstructions were performed. Dose reduction techniques were used.   CONTRAST: 100 mL Isovue-370    FINDINGS:  ANGIOGRAM CHEST: Pulmonary arteries are normal caliber and negative for pulmonary emboli. Thoracic aorta is negative for dissection. No CT evidence of right heart strain.    LUNGS AND PLEURA: Severe pneumonia with some patchy groundglass opacities along with some very dense consolidations in both lower lobes. Slightly atypical for COVID but still consistent with severe  COVID.    MEDIASTINUM/AXILLAE: No lymphadenopathy.    CORONARY ARTERY CALCIFICATION: None.    UPPER ABDOMEN: Moderate diffuse hepatic steatosis.    MUSCULOSKELETAL: Normal.      Impression    IMPRESSION:  1.  Severe pneumonia with some patchy groundglass infiltrates but also some very dense consolidative infiltrates most prominent in the lower lobes posteriorly. A superimposed bacterial pneumonia or aspiration pneumonia should be considered although   severe COVID pneumonia could have this appearance pattern is slightly atypical.    2.  Negative for pulmonary emboli.   XR Chest Port 1 View    Narrative    EXAM: XR CHEST PORT 1 VIEW  LOCATION: [facility]  DATE/TIME: 1/5/2022 10:11 PM    INDICATION: Intubation.  COMPARISON: CT 1/5/2022.    FINDINGS: ET tube 4.5 cm above the jan. NG tube passes into the stomach. Right PICC with tip in the distal SVC. No pneumothorax. The heart size is normal. There are again bilateral pulmonary infiltrates, right greater than left.      Impression    IMPRESSION: ET tube 4.5 cm above the jan.

## 2022-01-07 LAB
ALBUMIN SERPL-MCNC: 1.5 G/DL (ref 3.4–5)
ALP SERPL-CCNC: 37 U/L (ref 40–150)
ALT SERPL W P-5'-P-CCNC: 35 U/L (ref 0–70)
ANION GAP SERPL CALCULATED.3IONS-SCNC: 10 MMOL/L (ref 3–14)
ANION GAP SERPL CALCULATED.3IONS-SCNC: 11 MMOL/L (ref 3–14)
AST SERPL W P-5'-P-CCNC: 112 U/L (ref 0–45)
BASE EXCESS BLDV CALC-SCNC: 3.2 MMOL/L (ref -7.7–1.9)
BILIRUB SERPL-MCNC: 0.8 MG/DL (ref 0.2–1.3)
BUN SERPL-MCNC: 11 MG/DL (ref 7–30)
BUN SERPL-MCNC: 19 MG/DL (ref 7–30)
CA-I BLD-MCNC: 4.8 MG/DL (ref 4.4–5.2)
CALCIUM SERPL-MCNC: 8.3 MG/DL (ref 8.5–10.1)
CALCIUM SERPL-MCNC: 8.7 MG/DL (ref 8.5–10.1)
CHLORIDE BLD-SCNC: 101 MMOL/L (ref 94–109)
CHLORIDE BLD-SCNC: 103 MMOL/L (ref 94–109)
CO2 SERPL-SCNC: 22 MMOL/L (ref 20–32)
CO2 SERPL-SCNC: 24 MMOL/L (ref 20–32)
CREAT SERPL-MCNC: 0.62 MG/DL (ref 0.66–1.25)
CREAT SERPL-MCNC: 0.73 MG/DL (ref 0.66–1.25)
ERYTHROCYTE [DISTWIDTH] IN BLOOD BY AUTOMATED COUNT: 13.4 % (ref 10–15)
GFR SERPL CREATININE-BSD FRML MDRD: >90 ML/MIN/1.73M2
GFR SERPL CREATININE-BSD FRML MDRD: >90 ML/MIN/1.73M2
GLUCOSE BLD-MCNC: 106 MG/DL (ref 70–99)
GLUCOSE BLD-MCNC: 87 MG/DL (ref 70–99)
GLUCOSE BLDC GLUCOMTR-MCNC: 97 MG/DL (ref 70–99)
HCO3 BLDV-SCNC: 28 MMOL/L (ref 21–28)
HCT VFR BLD AUTO: 31 % (ref 40–53)
HGB BLD-MCNC: 10.6 G/DL (ref 13.3–17.7)
LACTATE SERPL-SCNC: 1.8 MMOL/L (ref 0.7–2)
MAGNESIUM SERPL-MCNC: 1.6 MG/DL (ref 1.6–2.3)
MAGNESIUM SERPL-MCNC: 2 MG/DL (ref 1.6–2.3)
MCH RBC QN AUTO: 32.7 PG (ref 26.5–33)
MCHC RBC AUTO-ENTMCNC: 34.2 G/DL (ref 31.5–36.5)
MCV RBC AUTO: 96 FL (ref 78–100)
O2/TOTAL GAS SETTING VFR VENT: 35 %
PCO2 BLDV: 40 MM HG (ref 40–50)
PH BLDV: 7.45 [PH] (ref 7.32–7.43)
PHOSPHATE SERPL-MCNC: 1.3 MG/DL (ref 2.5–4.5)
PHOSPHATE SERPL-MCNC: 1.6 MG/DL (ref 2.5–4.5)
PHOSPHATE SERPL-MCNC: 3.4 MG/DL (ref 2.5–4.5)
PHOSPHATE SERPL-MCNC: 3.6 MG/DL (ref 2.5–4.5)
PLATELET # BLD AUTO: 43 10E3/UL (ref 150–450)
PO2 BLDV: 33 MM HG (ref 25–47)
POTASSIUM BLD-SCNC: 3.7 MMOL/L (ref 3.4–5.3)
POTASSIUM BLD-SCNC: 3.8 MMOL/L (ref 3.4–5.3)
POTASSIUM BLD-SCNC: 3.9 MMOL/L (ref 3.4–5.3)
PROT SERPL-MCNC: 5.5 G/DL (ref 6.8–8.8)
RBC # BLD AUTO: 3.24 10E6/UL (ref 4.4–5.9)
SODIUM SERPL-SCNC: 135 MMOL/L (ref 133–144)
SODIUM SERPL-SCNC: 136 MMOL/L (ref 133–144)
WBC # BLD AUTO: 2 10E3/UL (ref 4–11)

## 2022-01-07 PROCEDURE — C9113 INJ PANTOPRAZOLE SODIUM, VIA: HCPCS | Performed by: STUDENT IN AN ORGANIZED HEALTH CARE EDUCATION/TRAINING PROGRAM

## 2022-01-07 PROCEDURE — 84100 ASSAY OF PHOSPHORUS: CPT | Performed by: STUDENT IN AN ORGANIZED HEALTH CARE EDUCATION/TRAINING PROGRAM

## 2022-01-07 PROCEDURE — 84100 ASSAY OF PHOSPHORUS: CPT | Performed by: SURGERY

## 2022-01-07 PROCEDURE — 83605 ASSAY OF LACTIC ACID: CPT | Performed by: STUDENT IN AN ORGANIZED HEALTH CARE EDUCATION/TRAINING PROGRAM

## 2022-01-07 PROCEDURE — 99291 CRITICAL CARE FIRST HOUR: CPT | Mod: GC | Performed by: INTERNAL MEDICINE

## 2022-01-07 PROCEDURE — 999N000157 HC STATISTIC RCP TIME EA 10 MIN

## 2022-01-07 PROCEDURE — 250N000013 HC RX MED GY IP 250 OP 250 PS 637: Performed by: STUDENT IN AN ORGANIZED HEALTH CARE EDUCATION/TRAINING PROGRAM

## 2022-01-07 PROCEDURE — 250N000011 HC RX IP 250 OP 636: Performed by: SURGERY

## 2022-01-07 PROCEDURE — 83735 ASSAY OF MAGNESIUM: CPT | Performed by: STUDENT IN AN ORGANIZED HEALTH CARE EDUCATION/TRAINING PROGRAM

## 2022-01-07 PROCEDURE — 250N000013 HC RX MED GY IP 250 OP 250 PS 637: Performed by: SURGERY

## 2022-01-07 PROCEDURE — 82330 ASSAY OF CALCIUM: CPT | Performed by: STUDENT IN AN ORGANIZED HEALTH CARE EDUCATION/TRAINING PROGRAM

## 2022-01-07 PROCEDURE — 82803 BLOOD GASES ANY COMBINATION: CPT | Performed by: STUDENT IN AN ORGANIZED HEALTH CARE EDUCATION/TRAINING PROGRAM

## 2022-01-07 PROCEDURE — 250N000013 HC RX MED GY IP 250 OP 250 PS 637

## 2022-01-07 PROCEDURE — 85027 COMPLETE CBC AUTOMATED: CPT | Performed by: STUDENT IN AN ORGANIZED HEALTH CARE EDUCATION/TRAINING PROGRAM

## 2022-01-07 PROCEDURE — 250N000013 HC RX MED GY IP 250 OP 250 PS 637: Performed by: INTERNAL MEDICINE

## 2022-01-07 PROCEDURE — 250N000011 HC RX IP 250 OP 636

## 2022-01-07 PROCEDURE — 94003 VENT MGMT INPAT SUBQ DAY: CPT

## 2022-01-07 PROCEDURE — 250N000009 HC RX 250: Performed by: STUDENT IN AN ORGANIZED HEALTH CARE EDUCATION/TRAINING PROGRAM

## 2022-01-07 PROCEDURE — 80053 COMPREHEN METABOLIC PANEL: CPT | Performed by: STUDENT IN AN ORGANIZED HEALTH CARE EDUCATION/TRAINING PROGRAM

## 2022-01-07 PROCEDURE — 200N000002 HC R&B ICU UMMC

## 2022-01-07 PROCEDURE — 250N000011 HC RX IP 250 OP 636: Performed by: STUDENT IN AN ORGANIZED HEALTH CARE EDUCATION/TRAINING PROGRAM

## 2022-01-07 PROCEDURE — 84132 ASSAY OF SERUM POTASSIUM: CPT | Performed by: STUDENT IN AN ORGANIZED HEALTH CARE EDUCATION/TRAINING PROGRAM

## 2022-01-07 PROCEDURE — 87040 BLOOD CULTURE FOR BACTERIA: CPT | Performed by: STUDENT IN AN ORGANIZED HEALTH CARE EDUCATION/TRAINING PROGRAM

## 2022-01-07 PROCEDURE — 258N000003 HC RX IP 258 OP 636

## 2022-01-07 PROCEDURE — 258N000003 HC RX IP 258 OP 636: Performed by: STUDENT IN AN ORGANIZED HEALTH CARE EDUCATION/TRAINING PROGRAM

## 2022-01-07 RX ORDER — MAGNESIUM SULFATE HEPTAHYDRATE 40 MG/ML
2 INJECTION, SOLUTION INTRAVENOUS ONCE
Status: COMPLETED | OUTPATIENT
Start: 2022-01-07 | End: 2022-01-07

## 2022-01-07 RX ORDER — ONDANSETRON 2 MG/ML
4 INJECTION INTRAMUSCULAR; INTRAVENOUS EVERY 6 HOURS PRN
Status: DISCONTINUED | OUTPATIENT
Start: 2022-01-07 | End: 2022-01-17 | Stop reason: HOSPADM

## 2022-01-07 RX ORDER — DEXMEDETOMIDINE HYDROCHLORIDE 4 UG/ML
.1-1.2 INJECTION, SOLUTION INTRAVENOUS CONTINUOUS
Status: DISCONTINUED | OUTPATIENT
Start: 2022-01-07 | End: 2022-01-08

## 2022-01-07 RX ORDER — POTASSIUM CHLORIDE 1.5 G/1.58G
20 POWDER, FOR SOLUTION ORAL ONCE
Status: COMPLETED | OUTPATIENT
Start: 2022-01-07 | End: 2022-01-07

## 2022-01-07 RX ORDER — MAGNESIUM SULFATE HEPTAHYDRATE 40 MG/ML
2 INJECTION, SOLUTION INTRAVENOUS ONCE
Status: COMPLETED | OUTPATIENT
Start: 2022-01-07 | End: 2022-01-08

## 2022-01-07 RX ADMIN — Medication 20 MG: at 03:00

## 2022-01-07 RX ADMIN — THIAMINE HYDROCHLORIDE 500 MG: 100 INJECTION, SOLUTION INTRAMUSCULAR; INTRAVENOUS at 10:38

## 2022-01-07 RX ADMIN — MAGNESIUM SULFATE HEPTAHYDRATE 2 G: 40 INJECTION, SOLUTION INTRAVENOUS at 23:28

## 2022-01-07 RX ADMIN — POTASSIUM & SODIUM PHOSPHATES POWDER PACK 280-160-250 MG 1 PACKET: 280-160-250 PACK at 20:17

## 2022-01-07 RX ADMIN — Medication 1 PACKET: at 08:20

## 2022-01-07 RX ADMIN — POTASSIUM PHOSPHATE, MONOBASIC AND POTASSIUM PHOSPHATE, DIBASIC 30 MMOL: 224; 236 INJECTION, SOLUTION INTRAVENOUS at 08:57

## 2022-01-07 RX ADMIN — Medication 50 MCG: at 16:38

## 2022-01-07 RX ADMIN — PROPOFOL 20 MCG/KG/MIN: 10 INJECTION, EMULSION INTRAVENOUS at 13:38

## 2022-01-07 RX ADMIN — THERA TABS 1 TABLET: TAB at 08:20

## 2022-01-07 RX ADMIN — FOLIC ACID 1 MG: 1 TABLET ORAL at 08:20

## 2022-01-07 RX ADMIN — Medication 1 PACKET: at 14:21

## 2022-01-07 RX ADMIN — Medication 50 MCG: at 03:41

## 2022-01-07 RX ADMIN — FENTANYL CITRATE 50 MCG/HR: 50 INJECTION INTRAVENOUS at 22:10

## 2022-01-07 RX ADMIN — MAGNESIUM SULFATE IN WATER 2 G: 40 INJECTION, SOLUTION INTRAVENOUS at 06:38

## 2022-01-07 RX ADMIN — STANDARDIZED SENNA CONCENTRATE 8.6 MG: 8.6 TABLET ORAL at 08:20

## 2022-01-07 RX ADMIN — POTASSIUM & SODIUM PHOSPHATES POWDER PACK 280-160-250 MG 1 PACKET: 280-160-250 PACK at 14:21

## 2022-01-07 RX ADMIN — POTASSIUM CHLORIDE 20 MEQ: 1.5 POWDER, FOR SOLUTION ORAL at 01:35

## 2022-01-07 RX ADMIN — PANTOPRAZOLE SODIUM 40 MG: 40 INJECTION, POWDER, FOR SOLUTION INTRAVENOUS at 08:19

## 2022-01-07 RX ADMIN — POTASSIUM CHLORIDE 20 MEQ: 1.5 POWDER, FOR SOLUTION ORAL at 23:28

## 2022-01-07 RX ADMIN — PROPOFOL 60 MCG/KG/MIN: 10 INJECTION, EMULSION INTRAVENOUS at 01:35

## 2022-01-07 RX ADMIN — CEFTRIAXONE SODIUM 2 G: 2 INJECTION, POWDER, FOR SOLUTION INTRAMUSCULAR; INTRAVENOUS at 10:04

## 2022-01-07 RX ADMIN — ACETAMINOPHEN 650 MG: 325 TABLET, FILM COATED ORAL at 04:40

## 2022-01-07 RX ADMIN — THIAMINE HYDROCHLORIDE 500 MG: 100 INJECTION, SOLUTION INTRAMUSCULAR; INTRAVENOUS at 19:48

## 2022-01-07 RX ADMIN — PROPOFOL 30 MCG/KG/MIN: 10 INJECTION, EMULSION INTRAVENOUS at 22:05

## 2022-01-07 RX ADMIN — ACETAMINOPHEN 650 MG: 325 TABLET, FILM COATED ORAL at 12:21

## 2022-01-07 RX ADMIN — Medication 10 MG: at 16:38

## 2022-01-07 RX ADMIN — DEXMEDETOMIDINE HYDROCHLORIDE 0.2 MCG/KG/HR: 400 INJECTION INTRAVENOUS at 04:27

## 2022-01-07 RX ADMIN — PROPOFOL 65 MCG/KG/MIN: 10 INJECTION, EMULSION INTRAVENOUS at 05:51

## 2022-01-07 RX ADMIN — Medication 1 PACKET: at 20:17

## 2022-01-07 RX ADMIN — THIAMINE HYDROCHLORIDE 500 MG: 100 INJECTION, SOLUTION INTRAMUSCULAR; INTRAVENOUS at 16:23

## 2022-01-07 ASSESSMENT — ACTIVITIES OF DAILY LIVING (ADL)
ADLS_ACUITY_SCORE: 10
ADLS_ACUITY_SCORE: 12
ADLS_ACUITY_SCORE: 10
ADLS_ACUITY_SCORE: 12
ADLS_ACUITY_SCORE: 10
ADLS_ACUITY_SCORE: 12
ADLS_ACUITY_SCORE: 12
ADLS_ACUITY_SCORE: 10
ADLS_ACUITY_SCORE: 12
ADLS_ACUITY_SCORE: 10
ADLS_ACUITY_SCORE: 12
ADLS_ACUITY_SCORE: 12
ADLS_ACUITY_SCORE: 10
ADLS_ACUITY_SCORE: 12

## 2022-01-07 ASSESSMENT — MIFFLIN-ST. JEOR: SCORE: 1726.82

## 2022-01-07 NOTE — PROGRESS NOTES
MEDICAL ICU PROGRESS NOTE  01/07/2022      Date of Service (when I saw the patient): 01/07/2022    ASSESSMENT: Brent Lau is a 41 year old male with PMH of tobacco use, alcohol use, and GERD who was admitted to the MICU as a transfer from OSH on 1/6/22 due to shock and acute hypoxic respiratory failure found to have strep pneumo PNA.     CHANGES and MAJOR THINGS TODAY:   - Precedex weaned off  - Wean off levophed  - PST today  - Phosphorus replacement to maintain level =>1.9. Added scheduled neutra-phos doses TID    PLAN:    Neuro:  # Pain and Sedation  - Fentanyl gtt w/boluses     # Alcohol use disorder  Per wife, patient drinks regularly up to 1 L of vodka a day. BAL elevated on OSH ED arrival. No charted or reported history of alcohol withdrawal/seizures.   - CIWA when extubated   - Folic acid and Multivitamin Daily  - Thiamine per Wernicke protocol   - Chemical Dependency consult when extubated      Pulmonary:  # Acute Hypoxic Respiratory Failure  # Strep Pneumo PNA  # Hx of Recent COVID infection  Patient with dense consolidative infiltrates in the lower lobes on CT c/f significant pneumonia found to have strep pneumo on blood and sputum culture as well as antigen   - Antibiotics as below      Ventilation Mode: CMV/AC  (Continuous Mandatory Ventilation/ Assist Control)  FiO2 (%): 35 %  Rate Set (breaths/minute): 22 breaths/min  Tidal Volume Set (mL): 500 mL  PEEP (cm H2O): 5 cmH2O  Oxygen Concentration (%): 40 %  Resp: 24       Cardiovascular:  # Septic shock   Likely in the setting of hypovolemia v distributive/vasoplegic v other. IVC on bedside U/S appears collapsible, could use more fluid resuscitation (2.25 L at OSH) and 2L within first few hours of transfer.   - MAP goal >65   - Pressors: Levophed, wean as able  - IVF: 1L this AM (1/6)   - Monitor iCal (replete to ~4.4)   - ECHO with EF of 30-35% and moderate diffuse hypokinesis     GI/Nutrition:  # Nutrition  - On TF     # GERD  - PPI for stress  ulcer prophylaxis      # Bowel Regimen   - Senna scheduled  - Miralax PRN      Renal/Electrolytes:  # Metabolic Acidosis   # Alcoholic Ketosis  # Lactic Acidosis, improving   Adequate respiratory compensation. Lactate 10.5 on ED arrival, improved after some fluid resuscitation. Ketones elevated, likely in the setting of alcohol use. CK normal.  - Trend Lactate  - IVF, should help with lactate and ketosis   - CTM      # Hypokalemia  # Hypomagnesemia   - CTM, replete PRN     Infectious Disease:  # Sepsis   # c/f CAP  # Strep Pneumo PNA  Patient had positive strep pneumo with gram+ cocci on BCx and Sputum Cx. Legionella, Covid, influenza and MRSA negative.  - Histo, Blast pending  - Antimicrobials:  Ceftriaxone 1/6 - present   Zosyn 1/5- D'karena same day  Vancomycin 1/5- D'karena same day  Azithromycin 1/5- D'karena same day     # Recent COVID  Patient with COVID infection > 1 month ago. Tested negative on ED arrival; however, due to primary respiratory problem will retest and keep on precautions overnight.      Hematology:  # Acute Thrombocytopenia   # Acute Normocytic Anemia   Likely in the setting of some likely alcoholic liver disease and some suppression in the setting of acute infection. No acute signs of bleeding on admission.   - CTM  - Hb goal > 7      Endocrine:  No active issues      MSK:  No active issues      Skin:  No active issues    General Cares/Prophylaxis:    DVT Prophylaxis: none at this time given thrombocytopenia  GI Prophylaxis: PPI  Restraints: chemical  Family Communication: Wife will be at bedside in PM, will update then  Code Status: Full Code    Lines/tubes/drains:  - PICC 1/6  - ETT 1/5  - Villatoro 1/6  - OG 1/6    Disposition:  - Medical ICU until respiratory support weaned.     Patient seen and findings/plan discussed with medical ICU staff, Dr. Sutton.    Flor Bianchi    ====================================  INTERVAL HISTORY:   The patient has now been weaned off precedex and propofol    Last  night he had been getting agitated, started on precedex. BP became low, had to restarted on levophed  Still needing low dose levophed  UOP can be lower since then    OBJECTIVE:   1. VITAL SIGNS:   Temp:  [98.1  F (36.7  C)-103.1  F (39.5  C)] 101.4  F (38.6  C)  Pulse:  [102-142] 116  Resp:  [22-28] 24  BP: ()/() 95/62  FiO2 (%):  [35 %-40 %] 35 %  SpO2:  [92 %-100 %] 100 %  Ventilation Mode: CMV/AC  (Continuous Mandatory Ventilation/ Assist Control)  FiO2 (%): 35 %  Rate Set (breaths/minute): 22 breaths/min  Tidal Volume Set (mL): 500 mL  PEEP (cm H2O): 5 cmH2O  Oxygen Concentration (%): 40 %  Resp: 24    2. INTAKE/ OUTPUT:   I/O last 3 completed shifts:  In: 2213.54 [I.V.:1593.54; NG/GT:620]  Out: 1600 [Urine:1600]    3. PHYSICAL EXAMINATION:  General: sedated, supine, comfortably laying in bed  HEENT: pupils equal and reactive  Neuro: sedated, able to follow simple commands and stirs to voice  Pulm/Resp: Clear breath sounds bilaterally without rhonchi, crackles or wheeze, breathing non-labored  CV: RRR, no murmur  Abdomen: Soft, non-distended, non-tender  :  newberry catheter in place, urine yellow and clear  Incisions/Skin: no rash or lesions visible    4. LABS:   Arterial Blood Gases   Recent Labs   Lab 01/06/22  1611 01/06/22  0234 01/05/22  2113 01/05/22  1518   PH 7.57* 7.40 7.28* 7.38   PCO2 26* 31* 33* 26*   PO2 91 111* 89 69*   HCO3 24 19* 16* 18*     Complete Blood Count   Recent Labs   Lab 01/07/22  0401 01/06/22  0051 01/05/22  1730 01/05/22  1357   WBC 2.0* 1.5* 2.4* 6.3   HGB 10.6* 11.0* 11.9* 13.3   PLT 43* 44* 54* 69*     Basic Metabolic Panel  Recent Labs   Lab 01/07/22  0401 01/07/22  0008 01/07/22  0001 01/06/22  1611 01/06/22  1129 01/06/22  1014 01/06/22  0829 01/06/22  0523     --   --  137  --  136  --  134   POTASSIUM 3.9  --  3.8 3.3*  --  4.3  --  4.6   CHLORIDE 101  --   --  103  --  104  --  104   CO2 24  --   --  23  --  23  --  19*   BUN 11  --   --  9  --  10  --  11    CR 0.62*  --   --  0.50*  --  0.63*  --  0.63*   * 97  --  88 101* 96   < > 109*    < > = values in this interval not displayed.     Liver Function Tests  Recent Labs   Lab 22  0401 22  1611 22  1014 22  1730 22  1357   *  --   --  119*  --    ALT 35  --   --  43  --    ALKPHOS 37*  --   --  56  --    BILITOTAL 0.8  --   --  1.2*  --    ALBUMIN 1.5* 1.6* 1.8* 2.3*  --    INR  --   --   --   --  1.08     Coagulation Profile  Recent Labs   Lab 22  1357   INR 1.08   PTT 37       5. RADIOLOGY:   Recent Results (from the past 24 hour(s))   Echo Complete   Result Value    LVEF  30-35% (moderately reduced)    Narrative    [id number]  [id number]  [id number]  [id number]^JACK^DIANA^S     [facility],[place]  Echocardiography Laboratory  [address] [place] [id number]     Name: ARAVIND MENDEZ  MRN: [id number]  : 1980  Study Date: 2022 07:11 AM  Age: 41 yrs  Gender: Male  Patient Location: [facility]  Reason For Study: Shock  Ordering Physician: DIANA SANTIAGO  Referring Physician: JEAN-CLAUDE NEAL  Performed By: Jaylyn Ricks RDCS     BSA: 2.0 m2  Height: 71 in  Weight: 172 lb  HR: 139  BP: 139/102 mmHg  ______________________________________________________________________________  Procedure  Echocardiogram with two-dimensional, color and spectral Doppler performed.  Contrast Optison. Optison (NDC #3942-2055-50) given intravenously. Patient was  given 5 ml mixture of 3 ml Optison and 6 ml saline. 4 ml wasted.  ______________________________________________________________________________  Interpretation Summary  The patient is in sinus tachycardia at 140 bpm during the study.  Left ventricular function is decreased. The ejection fraction is 30-35%  (moderately reduced). Moderate diffuse hypokinesis is present.  The right ventricle is normal size. Global right ventricular function is  mildly reduced.  There are no  significant valvular abnormalities.  No pericardial effusion is present.  IVC measures 1.3 cm despite mechanical ventilation suggesting low filling  pressures.     There is no prior study for direct comparison.  ______________________________________________________________________________  Left Ventricle  Left ventricular size is normal. Relative wall thickness is increased  consistent with concentric remodeling. Left ventricular function is decreased.  The ejection fraction is 30-35% (moderately reduced). Diastolic function not  assessed due to tachycardia. Moderate diffuse hypokinesis is present.     Right Ventricle  The right ventricle is normal size. Global right ventricular function is  mildly reduced.     Atria  Both atria appear normal.     Mitral Valve  The mitral valve is normal. Trace mitral insufficiency is present.     Aortic Valve  Aortic valve is normal in structure and function. The aortic valve is  tricuspid. On Doppler interrogation, there is no significant stenosis or  regurgitation.     Tricuspid Valve  The tricuspid valve is normal. Trace tricuspid insufficiency is present. The  peak velocity of the tricuspid regurgitant jet is not obtainable.     Pulmonic Valve  The pulmonic valve is normal. Trace pulmonic insufficiency is present.     Vessels  The aorta root is normal. The thoracic aorta is normal. The pulmonary artery  is normal. The patient is on the ventilator which interferres with the  assessment of the RAP, however the IVC measures 1.3 cm suggesting low filling  pressures.     Pericardium  No pericardial effusion is present.     Compared to Previous Study  There is no prior study for direct comparison.     Attestation  I have personally viewed the imaging and agree with the interpretation and  report as documented by the fellow, Earle Crocker, and/or edited by me.  ______________________________________________________________________________  MMode/2D Measurements &  Calculations  IVSd: 1.1 cm  LVIDd: 4.8 cm  LVIDs: 3.9 cm  LVPWd: 1.2 cm  FS: 19.3 %  LV mass(C)d: 204.3 grams  LV mass(C)dI: 103.3 grams/m2  Ao root diam: 3.8 cm  asc Aorta Diam: 3.7 cm  LVOT diam: 2.4 cm  LVOT area: 4.5 cm2     EF(MOD-bp): 32.0 %  LA Volume (BP): 56.3 ml     LA Volume Index (BP): 28.4 ml/m2  RWT: 0.50     Doppler Measurements & Calculations  PA acc time: 0.10 sec     ______________________________________________________________________________  Report approved by: Bonita Pineda 01/06/2022 10:00 AM

## 2022-01-07 NOTE — PLAN OF CARE
ICU End of Shift Summary. See flowsheets for vital signs and detailed assessment.    Changes this shift: Precedex started.  UOP tappering off.  Tachycardia increased until start of precedex.  Hypotensive episode following starting precedex.  Briefly needed Levo to recover MAPs.  Paused propofol, started precedex lower following MAP goal re obtainment.      Plan:  Titrate off levo.  Advance precedex as tolerated.  Keep comfortable.

## 2022-01-08 ENCOUNTER — APPOINTMENT (OUTPATIENT)
Dept: GENERAL RADIOLOGY | Facility: CLINIC | Age: 42
DRG: 870 | End: 2022-01-08
Attending: SURGERY
Payer: COMMERCIAL

## 2022-01-08 ENCOUNTER — ANESTHESIA (OUTPATIENT)
Dept: INTENSIVE CARE | Facility: CLINIC | Age: 42
DRG: 870 | End: 2022-01-08
Payer: COMMERCIAL

## 2022-01-08 ENCOUNTER — ANESTHESIA EVENT (OUTPATIENT)
Dept: INTENSIVE CARE | Facility: CLINIC | Age: 42
DRG: 870 | End: 2022-01-08
Payer: COMMERCIAL

## 2022-01-08 LAB
ALBUMIN SERPL-MCNC: 1.2 G/DL (ref 3.4–5)
ALP SERPL-CCNC: 115 U/L (ref 40–150)
ALT SERPL W P-5'-P-CCNC: 50 U/L (ref 0–70)
ANION GAP SERPL CALCULATED.3IONS-SCNC: 15 MMOL/L (ref 3–14)
AST SERPL W P-5'-P-CCNC: 211 U/L (ref 0–45)
BACTERIA BLD CULT: ABNORMAL
BACTERIA BLD CULT: ABNORMAL
BASE EXCESS BLDV CALC-SCNC: -0.2 MMOL/L (ref -7.7–1.9)
BASE EXCESS BLDV CALC-SCNC: -3 MMOL/L (ref -7.7–1.9)
BASE EXCESS BLDV CALC-SCNC: 0.5 MMOL/L (ref -7.7–1.9)
BASE EXCESS BLDV CALC-SCNC: 2.2 MMOL/L (ref -7.7–1.9)
BILIRUB SERPL-MCNC: 0.7 MG/DL (ref 0.2–1.3)
BUN SERPL-MCNC: 17 MG/DL (ref 7–30)
CA-I BLD-MCNC: 4.6 MG/DL (ref 4.4–5.2)
CALCIUM SERPL-MCNC: 8.7 MG/DL (ref 8.5–10.1)
CHLORIDE BLD-SCNC: 101 MMOL/L (ref 94–109)
CO2 SERPL-SCNC: 18 MMOL/L (ref 20–32)
CREAT SERPL-MCNC: 0.71 MG/DL (ref 0.66–1.25)
ERYTHROCYTE [DISTWIDTH] IN BLOOD BY AUTOMATED COUNT: 13.5 % (ref 10–15)
GFR SERPL CREATININE-BSD FRML MDRD: >90 ML/MIN/1.73M2
GLUCOSE BLD-MCNC: 102 MG/DL (ref 70–99)
GLUCOSE BLDC GLUCOMTR-MCNC: 114 MG/DL (ref 70–99)
GLUCOSE BLDC GLUCOMTR-MCNC: 123 MG/DL (ref 70–99)
GLUCOSE BLDC GLUCOMTR-MCNC: 125 MG/DL (ref 70–99)
GLUCOSE BLDC GLUCOMTR-MCNC: 137 MG/DL (ref 70–99)
HCO3 BLDV-SCNC: 21 MMOL/L (ref 21–28)
HCO3 BLDV-SCNC: 24 MMOL/L (ref 21–28)
HCO3 BLDV-SCNC: 24 MMOL/L (ref 21–28)
HCO3 BLDV-SCNC: 26 MMOL/L (ref 21–28)
HCT VFR BLD AUTO: 29.6 % (ref 40–53)
HGB BLD-MCNC: 10.1 G/DL (ref 13.3–17.7)
MAGNESIUM SERPL-MCNC: 5 MG/DL (ref 1.6–2.3)
MCH RBC QN AUTO: 32.7 PG (ref 26.5–33)
MCHC RBC AUTO-ENTMCNC: 34.1 G/DL (ref 31.5–36.5)
MCV RBC AUTO: 96 FL (ref 78–100)
O2/TOTAL GAS SETTING VFR VENT: 3 %
O2/TOTAL GAS SETTING VFR VENT: 35 %
O2/TOTAL GAS SETTING VFR VENT: 45 %
O2/TOTAL GAS SETTING VFR VENT: 50 %
PCO2 BLDV: 32 MM HG (ref 40–50)
PCO2 BLDV: 34 MM HG (ref 40–50)
PCO2 BLDV: 36 MM HG (ref 40–50)
PCO2 BLDV: 37 MM HG (ref 40–50)
PH BLDV: 7.43 [PH] (ref 7.32–7.43)
PH BLDV: 7.43 [PH] (ref 7.32–7.43)
PH BLDV: 7.46 [PH] (ref 7.32–7.43)
PH BLDV: 7.47 [PH] (ref 7.32–7.43)
PHOSPHATE SERPL-MCNC: 3 MG/DL (ref 2.5–4.5)
PHOSPHATE SERPL-MCNC: 3.2 MG/DL (ref 2.5–4.5)
PLATELET # BLD AUTO: 42 10E3/UL (ref 150–450)
PO2 BLDV: 31 MM HG (ref 25–47)
PO2 BLDV: 34 MM HG (ref 25–47)
PO2 BLDV: 35 MM HG (ref 25–47)
PO2 BLDV: 37 MM HG (ref 25–47)
POTASSIUM BLD-SCNC: 3.3 MMOL/L (ref 3.4–5.3)
POTASSIUM BLD-SCNC: 3.5 MMOL/L (ref 3.4–5.3)
POTASSIUM BLD-SCNC: 3.6 MMOL/L (ref 3.4–5.3)
PROT SERPL-MCNC: 5.6 G/DL (ref 6.8–8.8)
RBC # BLD AUTO: 3.09 10E6/UL (ref 4.4–5.9)
SODIUM SERPL-SCNC: 134 MMOL/L (ref 133–144)
WBC # BLD AUTO: 3.3 10E3/UL (ref 4–11)

## 2022-01-08 PROCEDURE — 83735 ASSAY OF MAGNESIUM: CPT | Performed by: STUDENT IN AN ORGANIZED HEALTH CARE EDUCATION/TRAINING PROGRAM

## 2022-01-08 PROCEDURE — 250N000013 HC RX MED GY IP 250 OP 250 PS 637: Performed by: STUDENT IN AN ORGANIZED HEALTH CARE EDUCATION/TRAINING PROGRAM

## 2022-01-08 PROCEDURE — 84132 ASSAY OF SERUM POTASSIUM: CPT | Performed by: INTERNAL MEDICINE

## 2022-01-08 PROCEDURE — 250N000011 HC RX IP 250 OP 636: Performed by: STUDENT IN AN ORGANIZED HEALTH CARE EDUCATION/TRAINING PROGRAM

## 2022-01-08 PROCEDURE — 82330 ASSAY OF CALCIUM: CPT | Performed by: STUDENT IN AN ORGANIZED HEALTH CARE EDUCATION/TRAINING PROGRAM

## 2022-01-08 PROCEDURE — 74018 RADEX ABDOMEN 1 VIEW: CPT | Mod: 26 | Performed by: STUDENT IN AN ORGANIZED HEALTH CARE EDUCATION/TRAINING PROGRAM

## 2022-01-08 PROCEDURE — 84100 ASSAY OF PHOSPHORUS: CPT | Performed by: STUDENT IN AN ORGANIZED HEALTH CARE EDUCATION/TRAINING PROGRAM

## 2022-01-08 PROCEDURE — 250N000011 HC RX IP 250 OP 636: Performed by: NURSE ANESTHETIST, CERTIFIED REGISTERED

## 2022-01-08 PROCEDURE — 999N000157 HC STATISTIC RCP TIME EA 10 MIN

## 2022-01-08 PROCEDURE — 94003 VENT MGMT INPAT SUBQ DAY: CPT

## 2022-01-08 PROCEDURE — 999N000065 XR CHEST PORT 1 VIEW

## 2022-01-08 PROCEDURE — 250N000011 HC RX IP 250 OP 636

## 2022-01-08 PROCEDURE — 250N000011 HC RX IP 250 OP 636: Performed by: INTERNAL MEDICINE

## 2022-01-08 PROCEDURE — 82803 BLOOD GASES ANY COMBINATION: CPT | Performed by: STUDENT IN AN ORGANIZED HEALTH CARE EDUCATION/TRAINING PROGRAM

## 2022-01-08 PROCEDURE — 250N000013 HC RX MED GY IP 250 OP 250 PS 637

## 2022-01-08 PROCEDURE — 84132 ASSAY OF SERUM POTASSIUM: CPT | Performed by: STUDENT IN AN ORGANIZED HEALTH CARE EDUCATION/TRAINING PROGRAM

## 2022-01-08 PROCEDURE — 80053 COMPREHEN METABOLIC PANEL: CPT | Performed by: STUDENT IN AN ORGANIZED HEALTH CARE EDUCATION/TRAINING PROGRAM

## 2022-01-08 PROCEDURE — 99291 CRITICAL CARE FIRST HOUR: CPT | Mod: GC | Performed by: INTERNAL MEDICINE

## 2022-01-08 PROCEDURE — 200N000002 HC R&B ICU UMMC

## 2022-01-08 PROCEDURE — 258N000003 HC RX IP 258 OP 636: Performed by: INTERNAL MEDICINE

## 2022-01-08 PROCEDURE — 87205 SMEAR GRAM STAIN: CPT | Performed by: STUDENT IN AN ORGANIZED HEALTH CARE EDUCATION/TRAINING PROGRAM

## 2022-01-08 PROCEDURE — 370N000003 HC ANESTHESIA WARD SERVICE

## 2022-01-08 PROCEDURE — 85027 COMPLETE CBC AUTOMATED: CPT | Performed by: STUDENT IN AN ORGANIZED HEALTH CARE EDUCATION/TRAINING PROGRAM

## 2022-01-08 PROCEDURE — 258N000003 HC RX IP 258 OP 636

## 2022-01-08 PROCEDURE — 250N000013 HC RX MED GY IP 250 OP 250 PS 637: Performed by: SURGERY

## 2022-01-08 PROCEDURE — 36415 COLL VENOUS BLD VENIPUNCTURE: CPT | Performed by: INTERNAL MEDICINE

## 2022-01-08 PROCEDURE — 999N000065 XR ABDOMEN 1 VIEW

## 2022-01-08 PROCEDURE — 87040 BLOOD CULTURE FOR BACTERIA: CPT | Performed by: INTERNAL MEDICINE

## 2022-01-08 PROCEDURE — 250N000009 HC RX 250: Performed by: NURSE ANESTHETIST, CERTIFIED REGISTERED

## 2022-01-08 PROCEDURE — C9113 INJ PANTOPRAZOLE SODIUM, VIA: HCPCS | Performed by: STUDENT IN AN ORGANIZED HEALTH CARE EDUCATION/TRAINING PROGRAM

## 2022-01-08 PROCEDURE — 71045 X-RAY EXAM CHEST 1 VIEW: CPT | Mod: 26 | Performed by: STUDENT IN AN ORGANIZED HEALTH CARE EDUCATION/TRAINING PROGRAM

## 2022-01-08 RX ORDER — FLUMAZENIL 0.1 MG/ML
0.2 INJECTION, SOLUTION INTRAVENOUS
Status: DISCONTINUED | OUTPATIENT
Start: 2022-01-08 | End: 2022-01-15

## 2022-01-08 RX ORDER — PROPOFOL 10 MG/ML
5-75 INJECTION, EMULSION INTRAVENOUS CONTINUOUS
Status: DISCONTINUED | OUTPATIENT
Start: 2022-01-08 | End: 2022-01-13

## 2022-01-08 RX ORDER — POTASSIUM CHLORIDE 1.5 G/1.58G
20 POWDER, FOR SOLUTION ORAL ONCE
Status: COMPLETED | OUTPATIENT
Start: 2022-01-08 | End: 2022-01-08

## 2022-01-08 RX ORDER — PROPOFOL 10 MG/ML
INJECTION, EMULSION INTRAVENOUS PRN
Status: DISCONTINUED | OUTPATIENT
Start: 2022-01-08 | End: 2022-01-08

## 2022-01-08 RX ORDER — PROPOFOL 10 MG/ML
INJECTION, EMULSION INTRAVENOUS
Status: COMPLETED
Start: 2022-01-08 | End: 2022-01-08

## 2022-01-08 RX ORDER — POTASSIUM CHLORIDE 29.8 MG/ML
20 INJECTION INTRAVENOUS
Status: COMPLETED | OUTPATIENT
Start: 2022-01-08 | End: 2022-01-08

## 2022-01-08 RX ORDER — DIAZEPAM 5 MG
10 TABLET ORAL EVERY 30 MIN PRN
Status: DISCONTINUED | OUTPATIENT
Start: 2022-01-08 | End: 2022-01-15

## 2022-01-08 RX ORDER — DIAZEPAM 10 MG/2ML
5-10 INJECTION, SOLUTION INTRAMUSCULAR; INTRAVENOUS EVERY 30 MIN PRN
Status: DISCONTINUED | OUTPATIENT
Start: 2022-01-08 | End: 2022-01-15

## 2022-01-08 RX ADMIN — FENTANYL CITRATE 50 MCG/HR: 50 INJECTION INTRAVENOUS at 17:55

## 2022-01-08 RX ADMIN — FOLIC ACID 1 MG: 1 TABLET ORAL at 08:45

## 2022-01-08 RX ADMIN — PANTOPRAZOLE SODIUM 40 MG: 40 INJECTION, POWDER, FOR SOLUTION INTRAVENOUS at 08:45

## 2022-01-08 RX ADMIN — PROPOFOL 120 MG: 10 INJECTION, EMULSION INTRAVENOUS at 18:09

## 2022-01-08 RX ADMIN — POTASSIUM & SODIUM PHOSPHATES POWDER PACK 280-160-250 MG 1 PACKET: 280-160-250 PACK at 08:45

## 2022-01-08 RX ADMIN — ACETAMINOPHEN 650 MG: 325 TABLET, FILM COATED ORAL at 23:43

## 2022-01-08 RX ADMIN — ACETAMINOPHEN 650 MG: 325 TABLET, FILM COATED ORAL at 08:45

## 2022-01-08 RX ADMIN — THIAMINE HYDROCHLORIDE 250 MG: 100 INJECTION, SOLUTION INTRAMUSCULAR; INTRAVENOUS at 08:45

## 2022-01-08 RX ADMIN — THERA TABS 1 TABLET: TAB at 08:45

## 2022-01-08 RX ADMIN — ROCURONIUM BROMIDE 100 MG: 50 INJECTION, SOLUTION INTRAVENOUS at 18:09

## 2022-01-08 RX ADMIN — CEFTRIAXONE SODIUM 2 G: 2 INJECTION, POWDER, FOR SOLUTION INTRAMUSCULAR; INTRAVENOUS at 10:13

## 2022-01-08 RX ADMIN — POTASSIUM CHLORIDE 20 MEQ: 29.8 INJECTION, SOLUTION INTRAVENOUS at 15:47

## 2022-01-08 RX ADMIN — POTASSIUM CHLORIDE 20 MEQ: 1.5 POWDER, FOR SOLUTION ORAL at 21:22

## 2022-01-08 RX ADMIN — POTASSIUM CHLORIDE 20 MEQ: 1.5 POWDER, FOR SOLUTION ORAL at 06:24

## 2022-01-08 RX ADMIN — POTASSIUM & SODIUM PHOSPHATES POWDER PACK 280-160-250 MG 1 PACKET: 280-160-250 PACK at 19:32

## 2022-01-08 RX ADMIN — PROPOFOL 40 MCG/KG/MIN: 10 INJECTION, EMULSION INTRAVENOUS at 17:54

## 2022-01-08 RX ADMIN — Medication 1 PACKET: at 19:32

## 2022-01-08 RX ADMIN — Medication 50 MCG: at 01:08

## 2022-01-08 RX ADMIN — Medication 1 PACKET: at 08:46

## 2022-01-08 RX ADMIN — PROPOFOL 50 MCG/KG/MIN: 10 INJECTION, EMULSION INTRAVENOUS at 22:16

## 2022-01-08 RX ADMIN — SODIUM CHLORIDE, POTASSIUM CHLORIDE, SODIUM LACTATE AND CALCIUM CHLORIDE 500 ML: 600; 310; 30; 20 INJECTION, SOLUTION INTRAVENOUS at 18:46

## 2022-01-08 RX ADMIN — POTASSIUM CHLORIDE 20 MEQ: 29.8 INJECTION, SOLUTION INTRAVENOUS at 16:54

## 2022-01-08 ASSESSMENT — ACTIVITIES OF DAILY LIVING (ADL)
ADLS_ACUITY_SCORE: 10
ADLS_ACUITY_SCORE: 12
ADLS_ACUITY_SCORE: 12
ADLS_ACUITY_SCORE: 10
ADLS_ACUITY_SCORE: 12
ADLS_ACUITY_SCORE: 10
ADLS_ACUITY_SCORE: 12
ADLS_ACUITY_SCORE: 12
ADLS_ACUITY_SCORE: 10
ADLS_ACUITY_SCORE: 12
ADLS_ACUITY_SCORE: 12
ADLS_ACUITY_SCORE: 10
ADLS_ACUITY_SCORE: 12
ADLS_ACUITY_SCORE: 12
ADLS_ACUITY_SCORE: 10
ADLS_ACUITY_SCORE: 12

## 2022-01-08 ASSESSMENT — MIFFLIN-ST. JEOR: SCORE: 1720.92

## 2022-01-08 NOTE — PROGRESS NOTES
RESPIRATORY NOTE:    Pt successfully extubated after couple of Hrs CPAP/PS 8/5 trail without complications. Strong loose cough and verbalized his name. Slightly tachypnic otherwise stable. Continue monitor with current POC.  José Manuel Boyd, RT

## 2022-01-08 NOTE — PROGRESS NOTES
MEDICAL ICU PROGRESS NOTE  01/08/2022      Date of Service (when I saw the patient): 01/08/2022    ASSESSMENT: Brent Lau is a 41 year old male with PMH of tobacco use, alcohol use, and GERD who was admitted to the MICU as a transfer from OSH on 1/6/22 due to shock and acute hypoxic respiratory failure found to have strep pneumo PNA.     CHANGES and MAJOR THINGS TODAY:   - Wean off sedation  - PST today at 7/5  - Possible extubation later today    PLAN:    Neuro:  # Pain and Sedation  - Propofol, wean off  - Fentanyl, wean off     # Alcohol use disorder  Per wife, patient drinks regularly up to 1 L of vodka a day. BAL elevated on OSH ED arrival. No charted or reported history of alcohol withdrawal/seizures.   - CIWA when extubated   - Folic acid and Multivitamin Daily  - Thiamine per Wernicke protocol   - Chemical Dependency consult when extubated      Pulmonary:  # Acute Hypoxic Respiratory Failure  # Strep Pneumo PNA  # Hx of Recent COVID infection  Patient with dense consolidative infiltrates in the lower lobes on CT c/f significant pneumonia found to have strep pneumo on blood and sputum culture as well as antigen   - Antibiotics as below   - PST today  Ventilation Mode: (S) CPAP/PS  (Continuous positive airway pressure with Pressure Support)  FiO2 (%): 35 %  Rate Set (breaths/minute): 22 breaths/min  Tidal Volume Set (mL): 500 mL  PEEP (cm H2O): 5 cmH2O  Pressure Support (cm H2O): (S) 8 cmH2O  Oxygen Concentration (%): 35 %  Resp: (!) 35     Cardiovascular:  # Septic shock   Likely in the setting of hypovolemia v distributive/vasoplegic v other. IVC on bedside U/S appears collapsible, could use more fluid resuscitation (2.25 L at OSH) and 2L within first few hours of transfer.   - MAP goal >65   - Pressors: Levophed, wean as able  - IVF: 1L this AM (1/6)   - Monitor iCal (replete to ~4.4)   - ECHO with EF of 30-35% and moderate diffuse hypokinesis     GI/Nutrition:  # Nutrition  - On TF     # GERD  - PPI  for stress ulcer prophylaxis      # Bowel Regimen   - Senna scheduled  - Miralax PRN      Renal/Electrolytes:  # Metabolic Acidosis   # Alcoholic Ketosis  # Lactic Acidosis, improving   Adequate respiratory compensation. Lactate 10.5 on ED arrival, improved after some fluid resuscitation. Ketones elevated, likely in the setting of alcohol use. CK normal.  - Trend Lactate  - IVF, should help with lactate and ketosis   - CTM      # Hypokalemia  # Hypomagnesemia   - CTM, replete PRN     Infectious Disease:  # Sepsis   # c/f CAP  # Strep Pneumo PNA  Patient had positive strep pneumo with gram+ cocci on BCx and Sputum Cx. Legionella, Covid, influenza and MRSA negative.  - Histo, Blast pending  - Antimicrobials:  Ceftriaxone 1/6 - present   Zosyn 1/5- D'karena same day  Vancomycin 1/5- D'karena same day  Azithromycin 1/5- D'karena same day     # Recent COVID  Patient with COVID infection > 1 month ago. Tested negative on ED arrival; however, due to primary respiratory problem will retest and keep on precautions overnight.      Hematology:  # Acute Thrombocytopenia   # Acute Normocytic Anemia   Likely in the setting of some likely alcoholic liver disease and some suppression in the setting of acute infection. No acute signs of bleeding on admission.   - CTM  - Hb goal > 7      Endocrine:  No active issues      MSK:  No active issues      Skin:  No active issues    General Cares/Prophylaxis:    DVT Prophylaxis: none at this time given thrombocytopenia  GI Prophylaxis: PPI  Restraints: chemical  Family Communication: Wife will be at bedside in PM, will update then  Code Status: Full Code    Lines/tubes/drains:  - PICC 1/6  - ETT 1/5  - Villatoro 1/6  - OG 1/6    Disposition:  - Medical ICU until respiratory support weaned.     Patient seen and findings/plan discussed with medical ICU staff, Dr. Sutton.    Flor Bianchi    ====================================  INTERVAL HISTORY:   Has been on precedex  He is fully conscious today and  following all commands  He did have PST from 1 PM to 5 PM yesterday and did well  Continues to have low grade fevers    OBJECTIVE:   1. VITAL SIGNS:   Temp:  [98.4  F (36.9  C)-101.5  F (38.6  C)] 100.5  F (38.1  C)  Pulse:  [] 117  Resp:  [22-28] 22  BP: ()/() 129/92  FiO2 (%):  [35 %] 35 %  SpO2:  [96 %-100 %] 96 %  Ventilation Mode: CMV/AC  (Continuous Mandatory Ventilation/ Assist Control)  FiO2 (%): 35 %  Rate Set (breaths/minute): 22 breaths/min  Tidal Volume Set (mL): 500 mL  PEEP (cm H2O): 5 cmH2O  Oxygen Concentration (%): 35 %  Resp: 22    2. INTAKE/ OUTPUT:   I/O last 3 completed shifts:  In: 1553.91 [I.V.:1143.91; NG/GT:355]  Out: 2125 [Urine:2125]    3. PHYSICAL EXAMINATION:  General: supine, comfortably laying in bed  HEENT: pupils equal and reactive  Neuro: sedated, able to follow simple commands and stirs to voice  Pulm/Resp: Clear breath sounds bilaterally without rhonchi, crackles or wheeze, breathing non-labored  CV: RRR, no murmur  Abdomen: Soft, non-distended, non-tender  :  newberry catheter in place, urine yellow and clear  Incisions/Skin: no rash or lesions visible    4. LABS:   Arterial Blood Gases   Recent Labs   Lab 01/06/22  1611 01/06/22  0234 01/05/22  2113 01/05/22  1518   PH 7.57* 7.40 7.28* 7.38   PCO2 26* 31* 33* 26*   PO2 91 111* 89 69*   HCO3 24 19* 16* 18*     Complete Blood Count   Recent Labs   Lab 01/08/22  0412 01/07/22  0401 01/06/22  0051 01/05/22  1730   WBC 3.3* 2.0* 1.5* 2.4*   HGB 10.1* 10.6* 11.0* 11.9*   PLT 42* 43* 44* 54*     Basic Metabolic Panel  Recent Labs   Lab 01/08/22  0412 01/07/22 2005 01/07/22  0401 01/07/22  0008 01/07/22  0001 01/06/22  1611    136 135  --   --  137   POTASSIUM 3.5 3.7 3.9  --  3.8 3.3*   CHLORIDE 101 103 101  --   --  103   CO2 18* 22 24  --   --  23   BUN 17 19 11  --   --  9   CR 0.71 0.73 0.62*  --   --  0.50*   * 87 106* 97  --  88     Liver Function Tests  Recent Labs   Lab 01/08/22 0412  01/07/22  0401 01/06/22  1611 01/06/22  1014 01/05/22  1730 01/05/22  1730 01/05/22  1357   * 112*  --   --   --  119*  --    ALT 50 35  --   --   --  43  --    ALKPHOS 115 37*  --   --   --  56  --    BILITOTAL 0.7 0.8  --   --   --  1.2*  --    ALBUMIN 1.2* 1.5* 1.6* 1.8*   < > 2.3*  --    INR  --   --   --   --   --   --  1.08    < > = values in this interval not displayed.     Coagulation Profile  Recent Labs   Lab 01/05/22  1357   INR 1.08   PTT 37       5. RADIOLOGY:   No results found for this or any previous visit (from the past 24 hour(s)).

## 2022-01-08 NOTE — PLAN OF CARE
ICU End of Shift Summary. See flowsheets for vital signs and detailed assessment.    Changes this shift: Patient extubated to oxymask 3LPM at 1315. After about 30 minutes, patient began to have increased work of breathing, tachypnea into the 40s, accessory muscle use; so patient was placed on high flow nasal cannula 50LPM; 50%.Incessant cough, with copious thick brown secretions, managed per patient. Continues to breathe 35-40 breaths per minute, shallow; MD is aware.     Patient is alert and oriented x 4. Slightly forgetful and impulsive. Bed alarm on for patient safely. CIWA scores 2-3, no valium given. Passed bedside swallow eval. Denies pain.     Patient has become increasingly tachycardic to 140s, and hypertensive to SPB of 160. MD notifed.     Villatoro catheter removed, voiding spontaneously. Has some urgency, external cath placed.     Loose watery BM x 3, patient is continent.     Blood sugar remains stable despite NPO status.     Plan: Monitor respiratory status.

## 2022-01-08 NOTE — PLAN OF CARE
ICU End of Shift Summary. See flowsheets for vital signs and detailed assessment.    Changes this shift: Patient beginning to stool more frequently.  Replaced mag once and potassium twice this shift.  Secretions copious both inline, and oral.  Urine beginning to become more straw colored, with UOP increasing.      Plan:  Continue to support respiratory status.  Treat with appropriate ABX.  Replace electrolytes as needed.

## 2022-01-09 ENCOUNTER — APPOINTMENT (OUTPATIENT)
Dept: CT IMAGING | Facility: CLINIC | Age: 42
DRG: 870 | End: 2022-01-09
Attending: SURGERY
Payer: COMMERCIAL

## 2022-01-09 LAB
ALBUMIN SERPL-MCNC: 1.4 G/DL (ref 3.4–5)
ALBUMIN UR-MCNC: 300 MG/DL
ALP SERPL-CCNC: 138 U/L (ref 40–150)
ALT SERPL W P-5'-P-CCNC: 47 U/L (ref 0–70)
ANION GAP SERPL CALCULATED.3IONS-SCNC: 7 MMOL/L (ref 3–14)
APPEARANCE UR: CLEAR
AST SERPL W P-5'-P-CCNC: 142 U/L (ref 0–45)
BASE EXCESS BLDA CALC-SCNC: 3.2 MMOL/L (ref -9–1.8)
BASE EXCESS BLDV CALC-SCNC: -3.1 MMOL/L (ref -7.7–1.9)
BILIRUB SERPL-MCNC: 0.7 MG/DL (ref 0.2–1.3)
BILIRUB UR QL STRIP: NEGATIVE
BUN SERPL-MCNC: 14 MG/DL (ref 7–30)
C PNEUM DNA SPEC QL NAA+PROBE: NOT DETECTED
CA-I BLD-MCNC: 4.4 MG/DL (ref 4.4–5.2)
CALCIUM SERPL-MCNC: 8.2 MG/DL (ref 8.5–10.1)
CHLORIDE BLD-SCNC: 105 MMOL/L (ref 94–109)
CO2 SERPL-SCNC: 26 MMOL/L (ref 20–32)
COLOR UR AUTO: YELLOW
CREAT SERPL-MCNC: 0.66 MG/DL (ref 0.66–1.25)
CRP SERPL-MCNC: 280 MG/L (ref 0–8)
CRP SERPL-MCNC: 340 MG/L (ref 0–8)
D DIMER PPP FEU-MCNC: 7.12 UG/ML FEU (ref 0–0.5)
ERYTHROCYTE [DISTWIDTH] IN BLOOD BY AUTOMATED COUNT: 13.4 % (ref 10–15)
FERRITIN SERPL-MCNC: 2207 NG/ML (ref 26–388)
FLUAV H1 2009 PAND RNA SPEC QL NAA+PROBE: NOT DETECTED
FLUAV H1 RNA SPEC QL NAA+PROBE: NOT DETECTED
FLUAV H3 RNA SPEC QL NAA+PROBE: NOT DETECTED
FLUAV RNA SPEC QL NAA+PROBE: NEGATIVE
FLUAV RNA SPEC QL NAA+PROBE: NOT DETECTED
FLUBV RNA RESP QL NAA+PROBE: NEGATIVE
FLUBV RNA SPEC QL NAA+PROBE: NOT DETECTED
GFR SERPL CREATININE-BSD FRML MDRD: >90 ML/MIN/1.73M2
GLUCOSE BLD-MCNC: 158 MG/DL (ref 70–99)
GLUCOSE BLDC GLUCOMTR-MCNC: 121 MG/DL (ref 70–99)
GLUCOSE BLDC GLUCOMTR-MCNC: 139 MG/DL (ref 70–99)
GLUCOSE UR STRIP-MCNC: 50 MG/DL
HADV DNA SPEC QL NAA+PROBE: NOT DETECTED
HCO3 BLD-SCNC: 26 MMOL/L (ref 21–28)
HCO3 BLDV-SCNC: 21 MMOL/L (ref 21–28)
HCOV PNL SPEC NAA+PROBE: NOT DETECTED
HCT VFR BLD AUTO: 29.8 % (ref 40–53)
HGB BLD-MCNC: 10.1 G/DL (ref 13.3–17.7)
HGB UR QL STRIP: ABNORMAL
HMPV RNA SPEC QL NAA+PROBE: NOT DETECTED
HPIV1 RNA SPEC QL NAA+PROBE: NOT DETECTED
HPIV2 RNA SPEC QL NAA+PROBE: NOT DETECTED
HPIV3 RNA SPEC QL NAA+PROBE: NOT DETECTED
HPIV4 RNA SPEC QL NAA+PROBE: NOT DETECTED
KETONES UR STRIP-MCNC: NEGATIVE MG/DL
LDH SERPL L TO P-CCNC: 565 U/L (ref 85–227)
LEUKOCYTE ESTERASE UR QL STRIP: NEGATIVE
M PNEUMO DNA SPEC QL NAA+PROBE: NOT DETECTED
MAGNESIUM SERPL-MCNC: 1.9 MG/DL (ref 1.6–2.3)
MCH RBC QN AUTO: 32.4 PG (ref 26.5–33)
MCHC RBC AUTO-ENTMCNC: 33.9 G/DL (ref 31.5–36.5)
MCV RBC AUTO: 96 FL (ref 78–100)
MUCOUS THREADS #/AREA URNS LPF: PRESENT /LPF
NITRATE UR QL: NEGATIVE
O2/TOTAL GAS SETTING VFR VENT: 45 %
O2/TOTAL GAS SETTING VFR VENT: 45 %
PCO2 BLD: 34 MM HG (ref 35–45)
PCO2 BLDV: 32 MM HG (ref 40–50)
PH BLD: 7.5 [PH] (ref 7.35–7.45)
PH BLDV: 7.42 [PH] (ref 7.32–7.43)
PH UR STRIP: 6 [PH] (ref 5–7)
PHOSPHATE SERPL-MCNC: 2.8 MG/DL (ref 2.5–4.5)
PLATELET # BLD AUTO: 58 10E3/UL (ref 150–450)
PO2 BLD: 84 MM HG (ref 80–105)
PO2 BLDV: 44 MM HG (ref 25–47)
POTASSIUM BLD-SCNC: 3.4 MMOL/L (ref 3.4–5.3)
POTASSIUM BLD-SCNC: 3.5 MMOL/L (ref 3.4–5.3)
PROCALCITONIN SERPL-MCNC: 47.05 NG/ML
PROT SERPL-MCNC: 5.9 G/DL (ref 6.8–8.8)
RBC # BLD AUTO: 3.12 10E6/UL (ref 4.4–5.9)
RBC URINE: 2 /HPF
RSV RNA SPEC NAA+PROBE: NEGATIVE
RSV RNA SPEC QL NAA+PROBE: NOT DETECTED
RSV RNA SPEC QL NAA+PROBE: NOT DETECTED
RV+EV RNA SPEC QL NAA+PROBE: NOT DETECTED
SARS-COV-2 RNA RESP QL NAA+PROBE: POSITIVE
SODIUM SERPL-SCNC: 138 MMOL/L (ref 133–144)
SP GR UR STRIP: 1.03 (ref 1–1.03)
SQUAMOUS EPITHELIAL: <1 /HPF
UROBILINOGEN UR STRIP-MCNC: NORMAL MG/DL
WBC # BLD AUTO: 5.6 10E3/UL (ref 4–11)
WBC URINE: 7 /HPF

## 2022-01-09 PROCEDURE — 250N000011 HC RX IP 250 OP 636

## 2022-01-09 PROCEDURE — 87040 BLOOD CULTURE FOR BACTERIA: CPT | Performed by: SURGERY

## 2022-01-09 PROCEDURE — 250N000013 HC RX MED GY IP 250 OP 250 PS 637: Performed by: SURGERY

## 2022-01-09 PROCEDURE — 81001 URINALYSIS AUTO W/SCOPE: CPT | Performed by: STUDENT IN AN ORGANIZED HEALTH CARE EDUCATION/TRAINING PROGRAM

## 2022-01-09 PROCEDURE — 71250 CT THORAX DX C-: CPT | Mod: 26 | Performed by: RADIOLOGY

## 2022-01-09 PROCEDURE — 83735 ASSAY OF MAGNESIUM: CPT | Performed by: STUDENT IN AN ORGANIZED HEALTH CARE EDUCATION/TRAINING PROGRAM

## 2022-01-09 PROCEDURE — 36592 COLLECT BLOOD FROM PICC: CPT | Performed by: STUDENT IN AN ORGANIZED HEALTH CARE EDUCATION/TRAINING PROGRAM

## 2022-01-09 PROCEDURE — 250N000009 HC RX 250: Performed by: STUDENT IN AN ORGANIZED HEALTH CARE EDUCATION/TRAINING PROGRAM

## 2022-01-09 PROCEDURE — 86140 C-REACTIVE PROTEIN: CPT | Performed by: STUDENT IN AN ORGANIZED HEALTH CARE EDUCATION/TRAINING PROGRAM

## 2022-01-09 PROCEDURE — 84145 PROCALCITONIN (PCT): CPT | Performed by: STUDENT IN AN ORGANIZED HEALTH CARE EDUCATION/TRAINING PROGRAM

## 2022-01-09 PROCEDURE — 999N000157 HC STATISTIC RCP TIME EA 10 MIN

## 2022-01-09 PROCEDURE — 82330 ASSAY OF CALCIUM: CPT | Performed by: STUDENT IN AN ORGANIZED HEALTH CARE EDUCATION/TRAINING PROGRAM

## 2022-01-09 PROCEDURE — 250N000013 HC RX MED GY IP 250 OP 250 PS 637: Performed by: STUDENT IN AN ORGANIZED HEALTH CARE EDUCATION/TRAINING PROGRAM

## 2022-01-09 PROCEDURE — 87205 SMEAR GRAM STAIN: CPT | Performed by: STUDENT IN AN ORGANIZED HEALTH CARE EDUCATION/TRAINING PROGRAM

## 2022-01-09 PROCEDURE — 258N000003 HC RX IP 258 OP 636: Performed by: STUDENT IN AN ORGANIZED HEALTH CARE EDUCATION/TRAINING PROGRAM

## 2022-01-09 PROCEDURE — 74176 CT ABD & PELVIS W/O CONTRAST: CPT | Mod: 26 | Performed by: RADIOLOGY

## 2022-01-09 PROCEDURE — 99291 CRITICAL CARE FIRST HOUR: CPT | Mod: GC | Performed by: INTERNAL MEDICINE

## 2022-01-09 PROCEDURE — 94003 VENT MGMT INPAT SUBQ DAY: CPT

## 2022-01-09 PROCEDURE — 36600 WITHDRAWAL OF ARTERIAL BLOOD: CPT

## 2022-01-09 PROCEDURE — 250N000011 HC RX IP 250 OP 636: Performed by: SURGERY

## 2022-01-09 PROCEDURE — 258N000003 HC RX IP 258 OP 636

## 2022-01-09 PROCEDURE — 80053 COMPREHEN METABOLIC PANEL: CPT | Performed by: STUDENT IN AN ORGANIZED HEALTH CARE EDUCATION/TRAINING PROGRAM

## 2022-01-09 PROCEDURE — 83615 LACTATE (LD) (LDH) ENZYME: CPT | Performed by: STUDENT IN AN ORGANIZED HEALTH CARE EDUCATION/TRAINING PROGRAM

## 2022-01-09 PROCEDURE — 85027 COMPLETE CBC AUTOMATED: CPT | Performed by: STUDENT IN AN ORGANIZED HEALTH CARE EDUCATION/TRAINING PROGRAM

## 2022-01-09 PROCEDURE — 87637 SARSCOV2&INF A&B&RSV AMP PRB: CPT | Performed by: STUDENT IN AN ORGANIZED HEALTH CARE EDUCATION/TRAINING PROGRAM

## 2022-01-09 PROCEDURE — 82803 BLOOD GASES ANY COMBINATION: CPT | Performed by: STUDENT IN AN ORGANIZED HEALTH CARE EDUCATION/TRAINING PROGRAM

## 2022-01-09 PROCEDURE — 82728 ASSAY OF FERRITIN: CPT | Performed by: STUDENT IN AN ORGANIZED HEALTH CARE EDUCATION/TRAINING PROGRAM

## 2022-01-09 PROCEDURE — 94640 AIRWAY INHALATION TREATMENT: CPT

## 2022-01-09 PROCEDURE — 999N000185 HC STATISTIC TRANSPORT TIME EA 15 MIN

## 2022-01-09 PROCEDURE — 87633 RESP VIRUS 12-25 TARGETS: CPT | Performed by: STUDENT IN AN ORGANIZED HEALTH CARE EDUCATION/TRAINING PROGRAM

## 2022-01-09 PROCEDURE — 250N000011 HC RX IP 250 OP 636: Performed by: STUDENT IN AN ORGANIZED HEALTH CARE EDUCATION/TRAINING PROGRAM

## 2022-01-09 PROCEDURE — 36415 COLL VENOUS BLD VENIPUNCTURE: CPT | Performed by: SURGERY

## 2022-01-09 PROCEDURE — 200N000002 HC R&B ICU UMMC

## 2022-01-09 PROCEDURE — 71250 CT THORAX DX C-: CPT

## 2022-01-09 PROCEDURE — 85379 FIBRIN DEGRADATION QUANT: CPT | Performed by: STUDENT IN AN ORGANIZED HEALTH CARE EDUCATION/TRAINING PROGRAM

## 2022-01-09 PROCEDURE — 84100 ASSAY OF PHOSPHORUS: CPT | Performed by: STUDENT IN AN ORGANIZED HEALTH CARE EDUCATION/TRAINING PROGRAM

## 2022-01-09 PROCEDURE — 82803 BLOOD GASES ANY COMBINATION: CPT | Performed by: SURGERY

## 2022-01-09 PROCEDURE — 258N000003 HC RX IP 258 OP 636: Performed by: SURGERY

## 2022-01-09 PROCEDURE — 94640 AIRWAY INHALATION TREATMENT: CPT | Mod: 76

## 2022-01-09 PROCEDURE — 84132 ASSAY OF SERUM POTASSIUM: CPT | Performed by: SURGERY

## 2022-01-09 RX ORDER — DEXAMETHASONE SODIUM PHOSPHATE 4 MG/ML
6 INJECTION, SOLUTION INTRA-ARTICULAR; INTRALESIONAL; INTRAMUSCULAR; INTRAVENOUS; SOFT TISSUE DAILY
Status: DISCONTINUED | OUTPATIENT
Start: 2022-01-09 | End: 2022-01-17 | Stop reason: HOSPADM

## 2022-01-09 RX ORDER — MAGNESIUM SULFATE HEPTAHYDRATE 40 MG/ML
2 INJECTION, SOLUTION INTRAVENOUS ONCE
Status: COMPLETED | OUTPATIENT
Start: 2022-01-09 | End: 2022-01-09

## 2022-01-09 RX ORDER — PHENOBARBITAL SODIUM 65 MG/ML
10 INJECTION, SOLUTION INTRAMUSCULAR; INTRAVENOUS ONCE
Status: DISCONTINUED | OUTPATIENT
Start: 2022-01-09 | End: 2022-01-09

## 2022-01-09 RX ORDER — POTASSIUM CHLORIDE 1.5 G/1.58G
20 POWDER, FOR SOLUTION ORAL ONCE
Status: COMPLETED | OUTPATIENT
Start: 2022-01-09 | End: 2022-01-09

## 2022-01-09 RX ORDER — IPRATROPIUM BROMIDE AND ALBUTEROL SULFATE 2.5; .5 MG/3ML; MG/3ML
3 SOLUTION RESPIRATORY (INHALATION) EVERY 4 HOURS
Status: DISCONTINUED | OUTPATIENT
Start: 2022-01-09 | End: 2022-01-09

## 2022-01-09 RX ORDER — ACETYLCYSTEINE 100 MG/ML
4 SOLUTION ORAL; RESPIRATORY (INHALATION) 4 TIMES DAILY
Status: DISCONTINUED | OUTPATIENT
Start: 2022-01-10 | End: 2022-01-11

## 2022-01-09 RX ORDER — POTASSIUM CHLORIDE 1.5 G/1.58G
40 POWDER, FOR SOLUTION ORAL ONCE
Status: COMPLETED | OUTPATIENT
Start: 2022-01-09 | End: 2022-01-09

## 2022-01-09 RX ORDER — IPRATROPIUM BROMIDE AND ALBUTEROL SULFATE 2.5; .5 MG/3ML; MG/3ML
3 SOLUTION RESPIRATORY (INHALATION) 4 TIMES DAILY
Status: DISCONTINUED | OUTPATIENT
Start: 2022-01-10 | End: 2022-01-10

## 2022-01-09 RX ORDER — ACETYLCYSTEINE 100 MG/ML
4 SOLUTION ORAL; RESPIRATORY (INHALATION) EVERY 4 HOURS
Status: DISCONTINUED | OUTPATIENT
Start: 2022-01-09 | End: 2022-01-09

## 2022-01-09 RX ADMIN — THERA TABS 1 TABLET: TAB at 08:36

## 2022-01-09 RX ADMIN — ACETYLCYSTEINE 4 ML: 100 SOLUTION ORAL; RESPIRATORY (INHALATION) at 19:57

## 2022-01-09 RX ADMIN — THIAMINE HYDROCHLORIDE 250 MG: 100 INJECTION, SOLUTION INTRAMUSCULAR; INTRAVENOUS at 13:59

## 2022-01-09 RX ADMIN — ACETYLCYSTEINE 4 ML: 100 SOLUTION ORAL; RESPIRATORY (INHALATION) at 16:09

## 2022-01-09 RX ADMIN — REMDESIVIR 200 MG: 100 INJECTION, POWDER, LYOPHILIZED, FOR SOLUTION INTRAVENOUS at 20:18

## 2022-01-09 RX ADMIN — ACETYLCYSTEINE 4 ML: 100 SOLUTION ORAL; RESPIRATORY (INHALATION) at 13:41

## 2022-01-09 RX ADMIN — FOLIC ACID 1 MG: 1 TABLET ORAL at 08:37

## 2022-01-09 RX ADMIN — Medication 1 PACKET: at 08:37

## 2022-01-09 RX ADMIN — MAGNESIUM SULFATE IN WATER 2 G: 40 INJECTION, SOLUTION INTRAVENOUS at 04:37

## 2022-01-09 RX ADMIN — PROPOFOL 60 MCG/KG/MIN: 10 INJECTION, EMULSION INTRAVENOUS at 14:29

## 2022-01-09 RX ADMIN — ENOXAPARIN SODIUM 40 MG: 40 INJECTION SUBCUTANEOUS at 20:20

## 2022-01-09 RX ADMIN — POTASSIUM CHLORIDE 40 MEQ: 1.5 POWDER, FOR SOLUTION ORAL at 04:37

## 2022-01-09 RX ADMIN — PHENOBARBITAL SODIUM 750 MG: 65 INJECTION INTRAMUSCULAR; INTRAVENOUS at 11:28

## 2022-01-09 RX ADMIN — Medication 40 MG: at 08:36

## 2022-01-09 RX ADMIN — PROPOFOL 60 MCG/KG/MIN: 10 INJECTION, EMULSION INTRAVENOUS at 10:26

## 2022-01-09 RX ADMIN — POTASSIUM CHLORIDE 20 MEQ: 1.5 POWDER, FOR SOLUTION ORAL at 13:59

## 2022-01-09 RX ADMIN — PROPOFOL 50 MCG/KG/MIN: 10 INJECTION, EMULSION INTRAVENOUS at 02:03

## 2022-01-09 RX ADMIN — Medication 1 PACKET: at 13:59

## 2022-01-09 RX ADMIN — SODIUM CHLORIDE 50 ML: 9 INJECTION, SOLUTION INTRAVENOUS at 20:19

## 2022-01-09 RX ADMIN — FENTANYL CITRATE 100 MCG/HR: 50 INJECTION INTRAVENOUS at 12:28

## 2022-01-09 RX ADMIN — Medication 1 PACKET: at 20:20

## 2022-01-09 RX ADMIN — DEXAMETHASONE SODIUM PHOSPHATE 6 MG: 4 INJECTION, SOLUTION INTRA-ARTICULAR; INTRALESIONAL; INTRAMUSCULAR; INTRAVENOUS; SOFT TISSUE at 16:59

## 2022-01-09 RX ADMIN — IPRATROPIUM BROMIDE AND ALBUTEROL SULFATE 3 ML: 2.5; .5 SOLUTION RESPIRATORY (INHALATION) at 16:09

## 2022-01-09 RX ADMIN — PROPOFOL 50 MCG/KG/MIN: 10 INJECTION, EMULSION INTRAVENOUS at 22:02

## 2022-01-09 RX ADMIN — CEFTRIAXONE SODIUM 2 G: 2 INJECTION, POWDER, FOR SOLUTION INTRAMUSCULAR; INTRAVENOUS at 10:25

## 2022-01-09 RX ADMIN — PROPOFOL 50 MCG/KG/MIN: 10 INJECTION, EMULSION INTRAVENOUS at 06:38

## 2022-01-09 RX ADMIN — IPRATROPIUM BROMIDE AND ALBUTEROL SULFATE 3 ML: 2.5; .5 SOLUTION RESPIRATORY (INHALATION) at 19:57

## 2022-01-09 RX ADMIN — IPRATROPIUM BROMIDE AND ALBUTEROL SULFATE 3 ML: 2.5; .5 SOLUTION RESPIRATORY (INHALATION) at 13:41

## 2022-01-09 RX ADMIN — PROPOFOL 50 MCG/KG/MIN: 10 INJECTION, EMULSION INTRAVENOUS at 18:30

## 2022-01-09 RX ADMIN — POTASSIUM & SODIUM PHOSPHATES POWDER PACK 280-160-250 MG 1 PACKET: 280-160-250 PACK at 08:36

## 2022-01-09 ASSESSMENT — MIFFLIN-ST. JEOR: SCORE: 1715.25

## 2022-01-09 ASSESSMENT — ACTIVITIES OF DAILY LIVING (ADL)
ADLS_ACUITY_SCORE: 10
ADLS_ACUITY_SCORE: 13
ADLS_ACUITY_SCORE: 10
ADLS_ACUITY_SCORE: 13
ADLS_ACUITY_SCORE: 10
ADLS_ACUITY_SCORE: 12
ADLS_ACUITY_SCORE: 10

## 2022-01-09 NOTE — PROGRESS NOTES
MEDICAL ICU PROGRESS NOTE  01/09/2022      Date of Service (when I saw the patient): 01/09/2022    ASSESSMENT: Brent Lau is a 41 year old male with PMH of tobacco use, alcohol use, and GERD who was admitted to the MICU as a transfer from OSH on 1/6/22 due to shock and acute hypoxic respiratory failure found to have strep pneumo PNA.     CHANGES and MAJOR THINGS TODAY:   - K, Mag replacement  - recheck CRP, procal  - duoneb q4h  - Mucomyst q4h  - bcx, ucx  - RSV panel  - CT c/a/p  - recheck COVID  - Phenobarbitol 10mg/kg once    PLAN:    Neuro:  # Pain and Sedation  RAAS -2.   - Propofol, wean off  - Fentanyl, wean off     # Alcohol use disorder  Per wife, patient has been drinking up to 1 L of vodka/day since Jan 28, 2021 after the sudden death of his sister. BAL elevated on OSH ED arrival. No charted or reported history of alcohol withdrawal/seizures; wife thinks there is a significant component of depression and would like him to be connected to a PCP at [facility] for mental health resources +/- psych meds.  - CIWA when extubated   - Phenobarbitol 10mg/kg once  - Folic acid and Multivitamin Daily  - Thiamine per Wernicke protocol   - Chemical Dependency consult when extubated      Pulmonary:  # Acute Hypoxic Respiratory Failure  # Strep Pneumo PNA  # Hx of Recent COVID infection  Patient with dense consolidative infiltrates in the lower lobes on CT c/f significant pneumonia found to have strep pneumo on blood and sputum culture as well as antigen. Extubated 1/8 and re intubated for tachypnea. Continues to fever through abx and , Procal 47.05 so will reimage and collect cultures for possible additional infectious sourse, empyema.   - COVID recheck pending  - bcx, ucx  - RSV panel   - CT c/a/p  - add Duoneb q4h  - Mucomyst q4h  - Antibiotics as below, currently Ceftriaxone 2g q24h    Ventilation Mode: CMV/AC  (Continuous Mandatory Ventilation/ Assist Control)  FiO2 (%): 45 %  Rate Set  (breaths/minute): 22 breaths/min  Tidal Volume Set (mL): 500 mL  PEEP (cm H2O): 7 cmH2O  Pressure Support (cm H2O): (S) 8 cmH2O  Oxygen Concentration (%): 45 %  Resp: 22     Cardiovascular:  # Septic shock   Likely in the setting of hypovolemia v distributive/vasoplegic v other. Got 2.25 L at OSH and 2L within first few hours of transfer.   - bcx, ucx  - RSV panel   - CT c/a/p  - MAP goal >65   - Pressors: none  - IVF: none  - Monitor iCal (replete to ~4.4)   - ECHO with EF of 30-35% and moderate diffuse hypokinesis     GI/Nutrition:  # Nutrition  - On TF @ 30mL/hr     # GERD  - PPI for stress ulcer prophylaxis      # Bowel Regimen   - Senna scheduled  - Miralax PRN      Renal/Electrolytes:  # Metabolic Acidosis   # Alcoholic Ketosis  # Lactic Acidosis, improved  Adequate respiratory compensation. Lactate 10.5 on ED arrival, improved after some fluid resuscitation. Ketones elevated, likely in the setting of alcohol use. CK normal. Lactate 1.8 today.  - Trend lactate     # Hypokalemia  # Hypomagnesemia   - daily labs; replete PRN     Infectious Disease:  # Sepsis   # c/f CAP  # Strep Pneumo PNA  Patient had positive strep pneumo with gram+ cocci on BCx and Sputum Cx. Legionella, Covid, influenza, Histo, Blasto and MRSA negative.   - bcx, ucx  - RSV panel   - CT c/a/p  - Antimicrobials:  Ceftriaxone 1/6 - present (Day 3/5)  Zosyn 1/5- D'karena same day  Vancomycin 1/5- D'karena same day  Azithromycin 1/5- D'karena same day    1/5/22 2/2 bcx + Strep pneumo  1/6/22 sputum cx + Strep pneumo, Staph aureus  1/6/22 bcx + Staph aureus  1/7/22 bcx NGTD  1/8/22 bcx NGTD  1/9/22 bcx NGTD     # Recent COVID  Patient with COVID infection > 1 month ago. Negative 1/6/22.  - recheck 1/9/22 for worsening imaging     Hematology:  # Acute Thrombocytopenia   # Acute Normocytic Anemia, stable  Likely in the setting of some likely alcoholic liver disease and some suppression in the setting of acute infection. No acute signs of bleeding on  admission. HgB today 10.1  - hold DVT prophylaxis for low platelets  - daily CBC  - Hb goal > 7      Endocrine:  No active issues      MSK:  No active issues  - PT/OT consulted     Skin:  No active issues    General Cares/Prophylaxis:    DVT Prophylaxis: none at this time given thrombocytopenia  GI Prophylaxis: PPI  Restraints: chemical, soft 4 point  Family Communication: updated wife, Zeynep, at bedside  Code Status: Full Code    Lines/tubes/drains:  - PICC 1/6  - ETT 1/8  - Villatoro 1/9  - OG 1/8    Disposition:  - Medical ICU until respiratory support weaned.     Patient seen and findings/plan discussed with medical ICU staff, Dr. Gaby Hallman, DO   she/her  PGY-2  Family Medicine    ====================================  INTERVAL HISTORY:     Last evening, required re-intubation for tachypnea, agitation, and restlessness. Propofol was increased to 60 overnight and then back to 50. Tube feeds increased to 30mL/hr. Villatoro cath placed for retention.    Per bedside discussion with wife: Mr. Lau started drinking this amount about 1 year ago after the sudden death of his sister. She thinks there is significant underlying depression and if that were treated with meds/therapy, his drinking would likely decrease. Would like the team to address chem dep when he clinically improves but likely will need to connect with psych/PCP outpatient. Wife prefers [facility] [place].     OBJECTIVE:   1. VITAL SIGNS:   Temp:  [99.8  F (37.7  C)-101.8  F (38.8  C)] 99.9  F (37.7  C)  Pulse:  [] 103  Resp:  [22-92] 22  BP: ()/() 86/70  Cuff Mean (mmHg):  [85] 85  FiO2 (%):  [45 %-70 %] 45 %  SpO2:  [90 %-99 %] 95 %  Ventilation Mode: CMV/AC  (Continuous Mandatory Ventilation/ Assist Control)  FiO2 (%): 45 %  Rate Set (breaths/minute): 22 breaths/min  Tidal Volume Set (mL): 500 mL  PEEP (cm H2O): 7 cmH2O  Pressure Support (cm H2O): (S) 8 cmH2O  Oxygen Concentration (%): 45 %  Resp: 22    2.  INTAKE/ OUTPUT:   I/O last 3 completed shifts:  In: 2131.04 [I.V.:826.04; NG/GT:405; IV Piggyback:500]  Out: 1625 [Urine:1625]    3. PHYSICAL EXAMINATION:  General: supine, comfortably laying in bed  Neuro: sedated, not able to follow simple commands  Pulm/Resp: Clear breath sounds bilaterally without rhonchi, crackles or wheeze, breathing non-labored  CV: tachycardic to 110, regular rhythm  Abdomen: Soft, non-distended, non-tender  :  newberry catheter in place, urine yellow and clear  Incisions/Skin: no rash or lesions visible  Lymph: no LE edema    4. LABS:   Arterial Blood Gases   Recent Labs   Lab 01/06/22  1611 01/06/22  0234 01/05/22  2113 01/05/22  1518   PH 7.57* 7.40 7.28* 7.38   PCO2 26* 31* 33* 26*   PO2 91 111* 89 69*   HCO3 24 19* 16* 18*     Complete Blood Count   Recent Labs   Lab 01/09/22  0349 01/08/22  0412 01/07/22  0401 01/06/22  0051   WBC 5.6 3.3* 2.0* 1.5*   HGB 10.1* 10.1* 10.6* 11.0*   PLT 58* 42* 43* 44*     Basic Metabolic Panel  Recent Labs   Lab 01/09/22  0855 01/09/22  0350 01/09/22  0349 01/08/22  2353 01/08/22  1949 01/08/22  1948 01/08/22  1554 01/08/22  1447 01/08/22  0836 01/08/22  0412 01/07/22 2005 01/07/22  0401   NA  --   --  138  --   --   --   --   --   --  134 136 135   POTASSIUM 3.5  --  3.4  --   --  3.6  --  3.3*  --  3.5 3.7 3.9   CHLORIDE  --   --  105  --   --   --   --   --   --  101 103 101   CO2  --   --  26  --   --   --   --   --   --  18* 22 24   BUN  --   --  14  --   --   --   --   --   --  17 19 11   CR  --   --  0.66  --   --   --   --   --   --  0.71 0.73 0.62*   GLC  --  139* 158* 121* 123*  --    < >  --    < > 102* 87 106*    < > = values in this interval not displayed.     Liver Function Tests  Recent Labs   Lab 01/09/22  0349 01/08/22  0412 01/07/22  0401 01/06/22  1611 01/06/22  1014 01/05/22  1730 01/05/22  1357   * 211* 112*  --   --  119*  --    ALT 47 50 35  --   --  43  --    ALKPHOS 138 115 37*  --   --  56  --    BILITOTAL 0.7 0.7 0.8   --   --  1.2*  --    ALBUMIN 1.4* 1.2* 1.5* 1.6*   < > 2.3*  --    INR  --   --   --   --   --   --  1.08    < > = values in this interval not displayed.     Coagulation Profile  Recent Labs   Lab 01/05/22  1357   INR 1.08   PTT 37       5. RADIOLOGY:   Recent Results (from the past 24 hour(s))   XR Chest Port 1 View    Narrative    Exam: XR CHEST PORT 1 VIEW, 1/8/2022 6:42 PM    Indication: ET PLACEMENT    Comparison: 1/6/2022    Findings:   ET tube distal tip projects over the midthoracic trachea. The right  PICC now changes coarse over the superior mediastinum is likely within  the azygos vein. Cardiac silhouette is not enlarged. There are diffuse  bibasilar predominant airspace opacities increased compared to prior.  No pneumothorax. Enteric tube courses into the stomach but distal tip  is not seen.      Impression    Impression:     1. ET tube projects over the midthoracic trachea, about 4.7 cm above  the jan.  2. Increased bibasilar airspace opacities.  3. The right PICC catheter is kinked with tip likely within the azygos  vein. Recommend repositioning.    Right PICC positioning discussed with Dr. Mac by Dr. Abebe at  8:50 PM on 1/8/2022.    I have personally reviewed the examination and initial interpretation  and I agree with the findings.    TALISHA JUAREZ MD         SYSTEM ID:  [id number]   XR Abdomen 1 View    Narrative    Exam: XR ABDOMEN 1 VIEW, 1/8/2022 6:42 PM    Indication: OG PLACEMENT ASSESSMENT    Comparison: 1/6/2022    Findings:     Enteric tube distal tip and sidehole projects over the stomach. No  obstructive bowel gas pattern. Nonspecific air-filled bowel loops. No  portal venous gas or pneumatosis. Bibasilar pulmonary opacities.      Impression    Impression:   1. Enteric tube distal tip and sidehole project over the stomach.   2. Nonspecific prominent but not dilated air-filled bowel loops.  3. Bibasilar pulmonary opacities, increased from prior    I have personally reviewed  the examination and initial interpretation  and I agree with the findings.    TALISHA JUAREZ MD         SYSTEM ID:  [id number]   CT Chest Abdomen Pelvis w/o Contrast    Narrative    EXAMINATION: CT CHEST ABDOMEN PELVIS W/O CONTRAST, 1/9/2022 11:22 AM    INDICATION: Sepsis    COMPARISON STUDY: CT chest, 1/5/2022; abdominal x-ray, 1/8/2022.    TECHNIQUE: CT scan of the chest, abdomen and pelvis was performed on  multidetector CT scanner using volumetric acquisition technique and  images were reconstructed in multiple planes with variable thickness  and reviewed on dedicated workstations.     CONTRAST: Without oral contrast.    CT scan radiation dose is optimized to minimum requisite dose using  automated dose modulation techniques.    FINDINGS:  Right upper extremity PICC line terminates in the right atrium. ET  tube terminates in the mid thoracic trachea. Gastric tube terminates  in the distal stomach. Villatoro catheter in the urinary bladder.    Chest:   Mediastinum: The heart size is normal. Prominent but not enlarged  lymph nodes in the mediastinum.    Pleura: Bilateral small pleural effusion. No pneumothorax.    Lungs: Increased consolidation of bilateral lower lobes with air  bronchograms. Increased scattered patchy groundglass opacities  throughout the lungs.    Abdomen and Pelvis:  Liver: No mass. No intrahepatic biliary ductal dilation.  Grossly  unchanged hepatomegaly and hepatic steatosis.    Biliary System: Contracted gallbladder. No extrahepatic biliary ductal  dilation.    Pancreas: No mass or pancreatic ductal dilation.    Adrenal glands: No mass or nodules    Spleen: Normal.    Kidneys: No suspicious mass, obstructing radiopaque calculus or  hydronephrosis.  Borderline enlarged bilateral kidneys with  perinephric fat stranding.    Gastrointestinal tract: Normal appendix. Normal caliber small bowel.    Mesentery/peritoneum/retroperitoneum: No mass. No free air. Small  ascites.    Lymph nodes: No  significant lymphadenopathy.    Pelvis: Urinary bladder is normal with a Villatoro catheter.    Osseous structures: No aggressive or acute osseous lesion.      Soft tissues: Anasarca.      Impression    IMPRESSION:   1. Increased consolidation of bilateral lower lobes and increased  patchy groundglass opacities throughout the lungs, suggestive of  worsening pneumonia.  2. Nonspecific borderline enlarged bilateral kidneys with perinephric  fat stranding, may be due to due to fluid overload status but  correlation with urinalysis is recommended to rule out pyelonephritis.    3. Anasarca and third space fluid accumulation.  4. Stable hepatomegaly with steatosis.    I have personally reviewed the examination and initial interpretation  and I agree with the findings.    RENA GROVER MD         SYSTEM ID:  [id number]

## 2022-01-09 NOTE — PLAN OF CARE
ICU End of Shift Summary. See flowsheets for vital signs and detailed assessment.    Changes this shift: Pt opens eyes to voice, follows commands intermittently. Very restless with movement and suctioning. Propofol increased to 60 during restless episode, decreased back to 50, fentanyl remains at 100. Tmax 101.4, tylenol given, ice packed, fan applied. ST initially in 130's, decreased throughout night, currently in 110's. BP soft periodically, resolved after decreasing propofol. CMV settings remain, FiO2 45%, RR 22, , PEEP 7. Moderate amount of brown/red-streaked secretions via ET tube. Strong cough with any suctioning and movement. TF increased to 30mL/hr @0200. Villatoro catheter placed for retention post-intubation with bladder scan>200mL. Adequate output after placement. K replaced x2, Mag replaced.     Plan: Aggressive pulmonary hygiene. Decrease sedation as able. K recheck @0900, replace electrolytes as needed.    Patient seen and examined at bedside.    --Anticoagulation--    T(C): 36.7 (08-14-19 @ 01:33), Max: 36.9 (08-13-19 @ 20:00)  HR: 106 (08-14-19 @ 02:32) (70 - 117)  BP: 151/84 (08-14-19 @ 01:33) (102/51 - 167/69)  RR: 18 (08-14-19 @ 02:32) (14 - 24)  SpO2: 95% (08-14-19 @ 02:32) (92% - 100%)  Wt(kg): --    Exam:  AAOx0, not FC, mumbling nonsensical words and moaning, CONTRERAS strongly. Pseudomeningoceal not tense, wound c/d/i.

## 2022-01-09 NOTE — PLAN OF CARE
ICU End of Shift Summary. See flowsheets for vital signs and detailed assessment.    Changes this shift:  Elevated temps, procal and CRP.  Pan cultures sent.  Pan CT done.  Thick, tan secretions from ETT.  Unable to assess CIWA with sedation; one time phenobarb given as ordered.  Pt's wife at bedside and updated with pt status and POC.  Retested for covid due to worsening GGO on CT scan.      Plan:   Await culture results.  Continue with POC.

## 2022-01-10 LAB
ALBUMIN SERPL-MCNC: 1.2 G/DL (ref 3.4–5)
ALP SERPL-CCNC: 107 U/L (ref 40–150)
ALT SERPL W P-5'-P-CCNC: 42 U/L (ref 0–70)
ANION GAP SERPL CALCULATED.3IONS-SCNC: 6 MMOL/L (ref 3–14)
ANION GAP SERPL CALCULATED.3IONS-SCNC: 8 MMOL/L (ref 3–14)
AST SERPL W P-5'-P-CCNC: 97 U/L (ref 0–45)
B DERMAT AB SER QL ID: NORMAL
BACTERIA SPT CULT: ABNORMAL
BACTERIA SPT CULT: ABNORMAL
BACTERIA SPT CULT: NO GROWTH
BASE EXCESS BLDV CALC-SCNC: 2 MMOL/L (ref -7.7–1.9)
BILIRUB SERPL-MCNC: 0.6 MG/DL (ref 0.2–1.3)
BUN SERPL-MCNC: 13 MG/DL (ref 7–30)
BUN SERPL-MCNC: 15 MG/DL (ref 7–30)
CA-I BLD-MCNC: 3.9 MG/DL (ref 4.4–5.2)
CALCIUM SERPL-MCNC: 7.2 MG/DL (ref 8.5–10.1)
CALCIUM SERPL-MCNC: 7.9 MG/DL (ref 8.5–10.1)
CHLORIDE BLD-SCNC: 101 MMOL/L (ref 94–109)
CHLORIDE BLD-SCNC: 99 MMOL/L (ref 94–109)
CO2 SERPL-SCNC: 23 MMOL/L (ref 20–32)
CO2 SERPL-SCNC: 24 MMOL/L (ref 20–32)
CREAT SERPL-MCNC: 0.56 MG/DL (ref 0.66–1.25)
CREAT SERPL-MCNC: 0.62 MG/DL (ref 0.66–1.25)
ERYTHROCYTE [DISTWIDTH] IN BLOOD BY AUTOMATED COUNT: 13.5 % (ref 10–15)
GFR SERPL CREATININE-BSD FRML MDRD: >90 ML/MIN/1.73M2
GFR SERPL CREATININE-BSD FRML MDRD: >90 ML/MIN/1.73M2
GLUCOSE BLD-MCNC: 212 MG/DL (ref 70–99)
GLUCOSE BLD-MCNC: 330 MG/DL (ref 70–99)
GLUCOSE BLDC GLUCOMTR-MCNC: 210 MG/DL (ref 70–99)
GLUCOSE BLDC GLUCOMTR-MCNC: 213 MG/DL (ref 70–99)
GLUCOSE BLDC GLUCOMTR-MCNC: 245 MG/DL (ref 70–99)
GLUCOSE BLDC GLUCOMTR-MCNC: 246 MG/DL (ref 70–99)
GLUCOSE BLDC GLUCOMTR-MCNC: 295 MG/DL (ref 70–99)
GLUCOSE BLDC GLUCOMTR-MCNC: 358 MG/DL (ref 70–99)
GRAM STAIN RESULT: ABNORMAL
GRAM STAIN RESULT: NORMAL
GRAM STAIN RESULT: NORMAL
H CAPSUL AG SER IA-ACNC: NOT DETECTED
H CAPSUL AG SER QL IA: NOT DETECTED
HCO3 BLDV-SCNC: 26 MMOL/L (ref 21–28)
HCT VFR BLD AUTO: 27.3 % (ref 40–53)
HGB BLD-MCNC: 9.9 G/DL (ref 13.3–17.7)
LACTATE SERPL-SCNC: 1.8 MMOL/L (ref 0.7–2)
MAGNESIUM SERPL-MCNC: 2.1 MG/DL (ref 1.6–2.3)
MCH RBC QN AUTO: 35 PG (ref 26.5–33)
MCHC RBC AUTO-ENTMCNC: 36.3 G/DL (ref 31.5–36.5)
MCV RBC AUTO: 97 FL (ref 78–100)
O2/TOTAL GAS SETTING VFR VENT: 45 %
PCO2 BLDV: 37 MM HG (ref 40–50)
PH BLDV: 7.46 [PH] (ref 7.32–7.43)
PHOSPHATE SERPL-MCNC: 2.5 MG/DL (ref 2.5–4.5)
PLAT MORPH BLD: NORMAL
PLATELET # BLD AUTO: 111 10E3/UL (ref 150–450)
PO2 BLDV: 39 MM HG (ref 25–47)
POTASSIUM BLD-SCNC: 3.6 MMOL/L (ref 3.4–5.3)
POTASSIUM BLD-SCNC: 3.8 MMOL/L (ref 3.4–5.3)
PROT SERPL-MCNC: 5.4 G/DL (ref 6.8–8.8)
RBC # BLD AUTO: 2.83 10E6/UL (ref 4.4–5.9)
RBC MORPH BLD: NORMAL
SCANNED LAB RESULT: NORMAL
SODIUM SERPL-SCNC: 128 MMOL/L (ref 133–144)
SODIUM SERPL-SCNC: 133 MMOL/L (ref 133–144)
TRIGL SERPL-MCNC: 1944 MG/DL
TRIGL SERPL-MCNC: 1985 MG/DL
TRIGL SERPL-MCNC: 262 MG/DL
WBC # BLD AUTO: 7.3 10E3/UL (ref 4–11)

## 2022-01-10 PROCEDURE — 250N000011 HC RX IP 250 OP 636

## 2022-01-10 PROCEDURE — 250N000013 HC RX MED GY IP 250 OP 250 PS 637: Performed by: SURGERY

## 2022-01-10 PROCEDURE — 84100 ASSAY OF PHOSPHORUS: CPT | Performed by: STUDENT IN AN ORGANIZED HEALTH CARE EDUCATION/TRAINING PROGRAM

## 2022-01-10 PROCEDURE — 200N000002 HC R&B ICU UMMC

## 2022-01-10 PROCEDURE — 250N000009 HC RX 250: Performed by: STUDENT IN AN ORGANIZED HEALTH CARE EDUCATION/TRAINING PROGRAM

## 2022-01-10 PROCEDURE — 83605 ASSAY OF LACTIC ACID: CPT | Performed by: STUDENT IN AN ORGANIZED HEALTH CARE EDUCATION/TRAINING PROGRAM

## 2022-01-10 PROCEDURE — 83735 ASSAY OF MAGNESIUM: CPT | Performed by: STUDENT IN AN ORGANIZED HEALTH CARE EDUCATION/TRAINING PROGRAM

## 2022-01-10 PROCEDURE — 250N000012 HC RX MED GY IP 250 OP 636 PS 637: Performed by: STUDENT IN AN ORGANIZED HEALTH CARE EDUCATION/TRAINING PROGRAM

## 2022-01-10 PROCEDURE — 99291 CRITICAL CARE FIRST HOUR: CPT | Mod: GC | Performed by: INTERNAL MEDICINE

## 2022-01-10 PROCEDURE — 82803 BLOOD GASES ANY COMBINATION: CPT | Performed by: STUDENT IN AN ORGANIZED HEALTH CARE EDUCATION/TRAINING PROGRAM

## 2022-01-10 PROCEDURE — 84478 ASSAY OF TRIGLYCERIDES: CPT | Performed by: STUDENT IN AN ORGANIZED HEALTH CARE EDUCATION/TRAINING PROGRAM

## 2022-01-10 PROCEDURE — 250N000011 HC RX IP 250 OP 636: Performed by: STUDENT IN AN ORGANIZED HEALTH CARE EDUCATION/TRAINING PROGRAM

## 2022-01-10 PROCEDURE — 84478 ASSAY OF TRIGLYCERIDES: CPT | Performed by: SURGERY

## 2022-01-10 PROCEDURE — 80053 COMPREHEN METABOLIC PANEL: CPT | Performed by: STUDENT IN AN ORGANIZED HEALTH CARE EDUCATION/TRAINING PROGRAM

## 2022-01-10 PROCEDURE — 94640 AIRWAY INHALATION TREATMENT: CPT | Mod: 76

## 2022-01-10 PROCEDURE — 250N000009 HC RX 250: Performed by: SURGERY

## 2022-01-10 PROCEDURE — 999N000157 HC STATISTIC RCP TIME EA 10 MIN

## 2022-01-10 PROCEDURE — 94003 VENT MGMT INPAT SUBQ DAY: CPT

## 2022-01-10 PROCEDURE — 82330 ASSAY OF CALCIUM: CPT | Performed by: STUDENT IN AN ORGANIZED HEALTH CARE EDUCATION/TRAINING PROGRAM

## 2022-01-10 PROCEDURE — 250N000013 HC RX MED GY IP 250 OP 250 PS 637: Performed by: STUDENT IN AN ORGANIZED HEALTH CARE EDUCATION/TRAINING PROGRAM

## 2022-01-10 PROCEDURE — 258N000003 HC RX IP 258 OP 636: Performed by: STUDENT IN AN ORGANIZED HEALTH CARE EDUCATION/TRAINING PROGRAM

## 2022-01-10 PROCEDURE — 94640 AIRWAY INHALATION TREATMENT: CPT

## 2022-01-10 PROCEDURE — 85014 HEMATOCRIT: CPT | Performed by: STUDENT IN AN ORGANIZED HEALTH CARE EDUCATION/TRAINING PROGRAM

## 2022-01-10 PROCEDURE — 258N000003 HC RX IP 258 OP 636

## 2022-01-10 RX ORDER — FUROSEMIDE 10 MG/ML
40 INJECTION INTRAMUSCULAR; INTRAVENOUS ONCE
Status: COMPLETED | OUTPATIENT
Start: 2022-01-10 | End: 2022-01-10

## 2022-01-10 RX ORDER — DEXTROSE MONOHYDRATE 25 G/50ML
25-50 INJECTION, SOLUTION INTRAVENOUS
Status: DISCONTINUED | OUTPATIENT
Start: 2022-01-10 | End: 2022-01-15

## 2022-01-10 RX ORDER — ALBUTEROL SULFATE 0.83 MG/ML
2.5 SOLUTION RESPIRATORY (INHALATION) EVERY 4 HOURS
Status: DISCONTINUED | OUTPATIENT
Start: 2022-01-10 | End: 2022-01-11

## 2022-01-10 RX ORDER — CEFTRIAXONE 2 G/1
2 INJECTION, POWDER, FOR SOLUTION INTRAMUSCULAR; INTRAVENOUS EVERY 24 HOURS
Status: DISCONTINUED | OUTPATIENT
Start: 2022-01-11 | End: 2022-01-17 | Stop reason: HOSPADM

## 2022-01-10 RX ORDER — DEXMEDETOMIDINE HYDROCHLORIDE 4 UG/ML
.1-1.2 INJECTION, SOLUTION INTRAVENOUS CONTINUOUS
Status: DISCONTINUED | OUTPATIENT
Start: 2022-01-10 | End: 2022-01-10

## 2022-01-10 RX ORDER — HYDRALAZINE HYDROCHLORIDE 20 MG/ML
10 INJECTION INTRAMUSCULAR; INTRAVENOUS EVERY 6 HOURS PRN
Status: DISCONTINUED | OUTPATIENT
Start: 2022-01-10 | End: 2022-01-17 | Stop reason: HOSPADM

## 2022-01-10 RX ORDER — POTASSIUM CHLORIDE 20MEQ/15ML
20 LIQUID (ML) ORAL ONCE
Status: COMPLETED | OUTPATIENT
Start: 2022-01-10 | End: 2022-01-10

## 2022-01-10 RX ORDER — DEXMEDETOMIDINE HYDROCHLORIDE 4 UG/ML
.1-1.2 INJECTION, SOLUTION INTRAVENOUS CONTINUOUS
Status: DISCONTINUED | OUTPATIENT
Start: 2022-01-10 | End: 2022-01-15

## 2022-01-10 RX ORDER — NICOTINE POLACRILEX 4 MG
15-30 LOZENGE BUCCAL
Status: DISCONTINUED | OUTPATIENT
Start: 2022-01-10 | End: 2022-01-15

## 2022-01-10 RX ORDER — QUETIAPINE FUMARATE 25 MG/1
25 TABLET, FILM COATED ORAL EVERY 6 HOURS PRN
Status: DISCONTINUED | OUTPATIENT
Start: 2022-01-10 | End: 2022-01-11

## 2022-01-10 RX ADMIN — DEXAMETHASONE SODIUM PHOSPHATE 6 MG: 4 INJECTION, SOLUTION INTRA-ARTICULAR; INTRALESIONAL; INTRAMUSCULAR; INTRAVENOUS; SOFT TISSUE at 12:44

## 2022-01-10 RX ADMIN — CEFTRIAXONE SODIUM 2 G: 2 INJECTION, POWDER, FOR SOLUTION INTRAMUSCULAR; INTRAVENOUS at 10:12

## 2022-01-10 RX ADMIN — Medication 50 MCG: at 10:29

## 2022-01-10 RX ADMIN — INSULIN ASPART 3 UNITS: 100 INJECTION, SOLUTION INTRAVENOUS; SUBCUTANEOUS at 20:24

## 2022-01-10 RX ADMIN — Medication 1 PACKET: at 14:10

## 2022-01-10 RX ADMIN — ALBUTEROL SULFATE 2.5 MG: 2.5 SOLUTION RESPIRATORY (INHALATION) at 12:19

## 2022-01-10 RX ADMIN — CALCIUM CHLORIDE 2 G: 100 INJECTION, SOLUTION INTRAVENOUS at 06:01

## 2022-01-10 RX ADMIN — FUROSEMIDE 40 MG: 10 INJECTION, SOLUTION INTRAVENOUS at 10:10

## 2022-01-10 RX ADMIN — INSULIN ASPART 2 UNITS: 100 INJECTION, SOLUTION INTRAVENOUS; SUBCUTANEOUS at 15:30

## 2022-01-10 RX ADMIN — ALBUTEROL SULFATE 2.5 MG: 2.5 SOLUTION RESPIRATORY (INHALATION) at 14:37

## 2022-01-10 RX ADMIN — FENTANYL CITRATE 100 MCG/HR: 50 INJECTION INTRAVENOUS at 14:05

## 2022-01-10 RX ADMIN — ENOXAPARIN SODIUM 40 MG: 40 INJECTION SUBCUTANEOUS at 08:49

## 2022-01-10 RX ADMIN — ACETYLCYSTEINE 4 ML: 100 SOLUTION ORAL; RESPIRATORY (INHALATION) at 21:07

## 2022-01-10 RX ADMIN — Medication 40 MG: at 08:49

## 2022-01-10 RX ADMIN — DEXMEDETOMIDINE HYDROCHLORIDE 0.6 MCG/KG/HR: 400 INJECTION INTRAVENOUS at 19:22

## 2022-01-10 RX ADMIN — PROPOFOL 60 MCG/KG/MIN: 10 INJECTION, EMULSION INTRAVENOUS at 11:26

## 2022-01-10 RX ADMIN — DEXMEDETOMIDINE HYDROCHLORIDE 0.3 MCG/KG/HR: 400 INJECTION INTRAVENOUS at 10:48

## 2022-01-10 RX ADMIN — PROPOFOL 60 MCG/KG/MIN: 10 INJECTION, EMULSION INTRAVENOUS at 04:48

## 2022-01-10 RX ADMIN — REMDESIVIR 100 MG: 100 INJECTION, POWDER, LYOPHILIZED, FOR SOLUTION INTRAVENOUS at 18:15

## 2022-01-10 RX ADMIN — DIAZEPAM 10 MG: 5 INJECTION, SOLUTION INTRAMUSCULAR; INTRAVENOUS at 10:43

## 2022-01-10 RX ADMIN — PROPOFOL 60 MCG/KG/MIN: 10 INJECTION, EMULSION INTRAVENOUS at 08:30

## 2022-01-10 RX ADMIN — ACETYLCYSTEINE 4 ML: 100 SOLUTION ORAL; RESPIRATORY (INHALATION) at 12:19

## 2022-01-10 RX ADMIN — ACETYLCYSTEINE 4 ML: 100 SOLUTION ORAL; RESPIRATORY (INHALATION) at 14:37

## 2022-01-10 RX ADMIN — ALBUTEROL SULFATE 2.5 MG: 2.5 SOLUTION RESPIRATORY (INHALATION) at 21:07

## 2022-01-10 RX ADMIN — FOLIC ACID 1 MG: 1 TABLET ORAL at 08:49

## 2022-01-10 RX ADMIN — Medication 1 PACKET: at 09:00

## 2022-01-10 RX ADMIN — SODIUM CHLORIDE 50 ML: 9 INJECTION, SOLUTION INTRAVENOUS at 19:24

## 2022-01-10 RX ADMIN — ENOXAPARIN SODIUM 40 MG: 40 INJECTION SUBCUTANEOUS at 20:24

## 2022-01-10 RX ADMIN — INSULIN ASPART 2 UNITS: 100 INJECTION, SOLUTION INTRAVENOUS; SUBCUTANEOUS at 14:03

## 2022-01-10 RX ADMIN — PROPOFOL 30 MCG/KG/MIN: 10 INJECTION, EMULSION INTRAVENOUS at 21:52

## 2022-01-10 RX ADMIN — QUETIAPINE FUMARATE 25 MG: 25 TABLET ORAL at 23:04

## 2022-01-10 RX ADMIN — ACETYLCYSTEINE 4 ML: 100 SOLUTION ORAL; RESPIRATORY (INHALATION) at 09:24

## 2022-01-10 RX ADMIN — Medication 1 PACKET: at 20:24

## 2022-01-10 RX ADMIN — THERA TABS 1 TABLET: TAB at 08:49

## 2022-01-10 RX ADMIN — THIAMINE HYDROCHLORIDE 250 MG: 100 INJECTION, SOLUTION INTRAMUSCULAR; INTRAVENOUS at 08:50

## 2022-01-10 RX ADMIN — POTASSIUM CHLORIDE 20 MEQ: 40 SOLUTION ORAL at 04:54

## 2022-01-10 RX ADMIN — FUROSEMIDE 40 MG: 10 INJECTION, SOLUTION INTRAVENOUS at 18:14

## 2022-01-10 RX ADMIN — IPRATROPIUM BROMIDE AND ALBUTEROL SULFATE 3 ML: 2.5; .5 SOLUTION RESPIRATORY (INHALATION) at 09:24

## 2022-01-10 RX ADMIN — PROPOFOL 70 MCG/KG/MIN: 10 INJECTION, EMULSION INTRAVENOUS at 01:42

## 2022-01-10 ASSESSMENT — ACTIVITIES OF DAILY LIVING (ADL)
ADLS_ACUITY_SCORE: 15
ADLS_ACUITY_SCORE: 14
ADLS_ACUITY_SCORE: 15
ADLS_ACUITY_SCORE: 15
ADLS_ACUITY_SCORE: 14
ADLS_ACUITY_SCORE: 15
ADLS_ACUITY_SCORE: 15
DEPENDENT_IADLS:: INDEPENDENT
ADLS_ACUITY_SCORE: 15
ADLS_ACUITY_SCORE: 13
ADLS_ACUITY_SCORE: 15
ADLS_ACUITY_SCORE: 15
ADLS_ACUITY_SCORE: 14
ADLS_ACUITY_SCORE: 14
ADLS_ACUITY_SCORE: 13
ADLS_ACUITY_SCORE: 15
ADLS_ACUITY_SCORE: 15
ADLS_ACUITY_SCORE: 19
ADLS_ACUITY_SCORE: 16
ADLS_ACUITY_SCORE: 15
ADLS_ACUITY_SCORE: 14
ADLS_ACUITY_SCORE: 19

## 2022-01-10 ASSESSMENT — MIFFLIN-ST. JEOR: SCORE: 1721.25

## 2022-01-10 NOTE — PROGRESS NOTES
SPIRITUAL HEALTH SERVICES  SPIRITUAL ASSESSMENT Progress Note  [facility] ([place] 4C     REFERRAL SOURCE: Family request     Pt intubated and sedated, so phone call placed to wife Zeynep.  She expressed sadness and grief over her 's condition and hospitalization and shared her struggle over caring for her two daughters who are 11 and 13.  Pt said she also is struggling with her faustino, as things are difficult right now and she is having trouble connecting with God.  We explored what it might look like to renew that connection however is meaningful for her, and other coping mechanisms and support during this time.  I let her know that chaplains will continue to be available for her and for her  during her 's hospitalization and I would continue to provide support.    PLAN: [facility] will remain available     Marguerite Bertrand    Pager: [phone]

## 2022-01-10 NOTE — PLAN OF CARE
Major Shift Events:  Repeat Covid test positive. Started on decadron. Remdesivir ordered, to be given when arrives from pharmacy. Soft blood pressures, MAP >65 without pressor support. Villatoro patent, UOP 50-60 mL/hour. Remains on fentanyl and propofol gtts.   Plan: Close monitoring hemodynamics. Respiratory support.   For vital signs and complete assessments, please see documentation flowsheets.

## 2022-01-10 NOTE — PLAN OF CARE
ICU End of Shift Summary. See flowsheets for vital signs and detailed assessment.    Changes this shift: wrist restraints ordered d/t pulling at ETT, inconsistently following commands, increased sedation due to agitation, SR/ST, adequate urine output, 1 small BM, fist dose of remdisivir given, K+ replaced, calcium replaced     Plan: monitor respiratory status and wean as tolerated, monitor hemodynamics

## 2022-01-10 NOTE — CONSULTS
Care Management Initial Consult    General Information  Assessment completed with: Spouse or significant otherJudyZeynep    Type of CM/SW Visit: Initial Assessment    Primary Care Provider verified and updated as needed: Yes   Readmission within the last 30 days:     Reason for Consult: grief and loss  Advance Care Planning: Advance Care Planning Reviewed: no concerns identified,education/resources on health care directives provided,questions answered     Communication Assessment  Patient's communication style: spoken language (English or Bilingual)    Hearing Difficulty or Deaf: no   Wear Glasses or Blind: no    Cognitive  Cognitive/Neuro/Behavioral: .WDL except  Level of Consciousness: alert  Arousal Level: opens eyes spontaneously  Orientation: other (see comments) (cortney)  Mood/Behavior: agitated  Best Language:  (CORTNEY)  Speech: endotracheal tube    Living Environment:   People in home: child(shelly), dependent,spouse     Current living Arrangements: house      Able to return to prior arrangements: yes     Family/Social Support:  Care provided by: self  Provides care for: child(shelly)  Marital Status:   Support System: Wife,Children,Parent(s),Sibling(s)          Description of Support System: Supportive,Involved    Support Assessment: Adequate family and caregiver support,Adequate social supports    Current Resources:   Patient receiving home care services: No     Community Resources: Financial/Insurance. Receiving SSI for daughters disability   Equipment currently used at home: none  Supplies currently used at home: None    Employment/Financial:  Employment Status: employed full-time     Financial Concerns: other (see comments) (Pt's work was main source of income)   Referral to Financial Counselor: No     Lifestyle & Psychosocial Needs:  Social Determinants of Health     Tobacco Use: High Risk     Smoking Tobacco Use: Current Every Day Smoker     Smokeless Tobacco Use: Former User   Alcohol Use: Not on file  "  Financial Resource Strain: Not on file   Food Insecurity: Not on file   Transportation Needs: Not on file   Physical Activity: Not on file   Stress: Not on file   Social Connections: Not on file   Intimate Partner Violence: Not on file   Depression: Not at risk     PHQ-2 Score: 0   Housing Stability: Not on file       Functional Status:  Prior to admission patient needed assistance:   Dependent ADLs:: Independent  Dependent IADLs:: Independent  Assesssment of Functional Status: Not at baseline with ADL Functioning,Not at baseline with mobility,Not at  functional baseline    Mental Health Status:  Mental Health Status: Current Concern       Chemical Dependency Status:  Chemical Dependency Status: Current Concern           Values/Beliefs:  Spiritual, Cultural Beliefs, Uatsdin Practices, Values: yes -Mosque      Cultural/Uatsdin Practices Patient Routinely Participates In: ceremony,prayer       Additional Information:  Per H&P, \"Brent Lau  is a 41-year-old man who was admitted to the ICU for shock and acute hypoxic respiratory failure due to bilateral lower lobe pneumonia that requires full vent support. The patient's preexisting medical problems include alcohol use, tobacco use, and GERD.\"    EZIO spoke with pt's wife, Zeynep, via phone to introduce self and role and complete assessment. Zeynep reported that Brent is independent at baseline and does not receive any services in home or community. Pt and wife have two daughters, ages 11 and 13, and Zeynep expressed feeling overwhelmed with being a single parent while pt is hospitalized. Zeynep reported that she has a good support system of family, friends, and a therapist. Pt has a large family support system but has never received mental health treatment. Zeynep reported that pt had a lot of loss and tragedy in the last year, including the death of his sister and uncle. His mom recently was diagnosed with stage 4 ovarian cancer and is not seeking " chemotherapy. All of this has been very emotionally taxing on the pt and he has coped by increasing his alcohol intake. Zeynep shared that his drinking has become problematic and has caused problems for their marriage. Zeynep believes that pt needs mental health/psych support more than anything.  EZIO informed Zeynep about psych/mental health and chem dep support at hospital. Zeynep denied having any questions at this time but did note that having a  check in would be beneficial. EZIO put in Spiritual Health consult.     EZIO/[facility] will continue to follow for support    ALEX Delacruz, RICARDOSW  4A/4C   Ph: [phone]  Pager: [phone]

## 2022-01-10 NOTE — PLAN OF CARE
8 hr shift summary note: See flowsheets for vital signs and detailed assessment.    Changes this shift: Very agitated this morning and still trying to climb out of bed with restraints on but was intermittently able to redirect; PRNs given and fent, prop, precedex on for sedation, PRN seroquel also ordered; PST for less than 1hr d/t tachypnea and tachycardia, diuresed x1 with good response; hemodynamically stable; sliding scale insulin added for BG in the 200s; TF at goal, 1 BM this shift    Plan: Continue to wean sedation and vent settings as tolerated

## 2022-01-10 NOTE — PROGRESS NOTES
ICU End of Shift Summary. See flowsheets for vital signs and detailed assessment.    Changes this shift: Patient transferred to room 4311. Continuing to titrated sedation: Fentanyl to 100 mcg/hr, Propofol to 40 mcg/kg/min, and Precedex to 0.5 mcg/kg/hr. Patient is light sedated but able to shake head to respond no pain. Urine output around 100 ml/hr, Lasix ordered once at evening.    Plan: will keep titrated sedation.

## 2022-01-11 ENCOUNTER — APPOINTMENT (OUTPATIENT)
Dept: GENERAL RADIOLOGY | Facility: CLINIC | Age: 42
DRG: 870 | End: 2022-01-11
Attending: SURGERY
Payer: COMMERCIAL

## 2022-01-11 LAB
ALBUMIN SERPL-MCNC: 1.2 G/DL (ref 3.4–5)
ALBUMIN UR-MCNC: 10 MG/DL
ALP SERPL-CCNC: 116 U/L (ref 40–150)
ALT SERPL W P-5'-P-CCNC: 73 U/L (ref 0–70)
ANION GAP SERPL CALCULATED.3IONS-SCNC: 6 MMOL/L (ref 3–14)
APPEARANCE UR: CLEAR
AST SERPL W P-5'-P-CCNC: 100 U/L (ref 0–45)
BACTERIA SPT CULT: NO GROWTH
BASE EXCESS BLDV CALC-SCNC: 3 MMOL/L (ref -7.7–1.9)
BASOPHILS # BLD AUTO: 0 10E3/UL (ref 0–0.2)
BASOPHILS # BLD MANUAL: 0 10E3/UL (ref 0–0.2)
BASOPHILS NFR BLD AUTO: 0 %
BASOPHILS NFR BLD MANUAL: 0 %
BILIRUB SERPL-MCNC: 0.3 MG/DL (ref 0.2–1.3)
BILIRUB UR QL STRIP: NEGATIVE
BUN SERPL-MCNC: 15 MG/DL (ref 7–30)
CA-I BLD-MCNC: 4.3 MG/DL (ref 4.4–5.2)
CALCIUM SERPL-MCNC: 7.6 MG/DL (ref 8.5–10.1)
CHLORIDE BLD-SCNC: 107 MMOL/L (ref 94–109)
CO2 SERPL-SCNC: 26 MMOL/L (ref 20–32)
COLOR UR AUTO: ABNORMAL
CREAT SERPL-MCNC: 0.56 MG/DL (ref 0.66–1.25)
EOSINOPHIL # BLD AUTO: 0.1 10E3/UL (ref 0–0.7)
EOSINOPHIL # BLD MANUAL: 0 10E3/UL (ref 0–0.7)
EOSINOPHIL NFR BLD AUTO: 1 %
EOSINOPHIL NFR BLD MANUAL: 0 %
ERYTHROCYTE [DISTWIDTH] IN BLOOD BY AUTOMATED COUNT: 13.4 % (ref 10–15)
ERYTHROCYTE [DISTWIDTH] IN BLOOD BY AUTOMATED COUNT: 13.5 % (ref 10–15)
GFR SERPL CREATININE-BSD FRML MDRD: >90 ML/MIN/1.73M2
GLUCOSE BLD-MCNC: 268 MG/DL (ref 70–99)
GLUCOSE BLDC GLUCOMTR-MCNC: 205 MG/DL (ref 70–99)
GLUCOSE BLDC GLUCOMTR-MCNC: 246 MG/DL (ref 70–99)
GLUCOSE BLDC GLUCOMTR-MCNC: 249 MG/DL (ref 70–99)
GLUCOSE BLDC GLUCOMTR-MCNC: 259 MG/DL (ref 70–99)
GLUCOSE BLDC GLUCOMTR-MCNC: 292 MG/DL (ref 70–99)
GLUCOSE BLDC GLUCOMTR-MCNC: 301 MG/DL (ref 70–99)
GLUCOSE UR STRIP-MCNC: NEGATIVE MG/DL
GRAM STAIN RESULT: NORMAL
GRAM STAIN RESULT: NORMAL
HCO3 BLDV-SCNC: 27 MMOL/L (ref 21–28)
HCT VFR BLD AUTO: 28.5 % (ref 40–53)
HCT VFR BLD AUTO: 28.7 % (ref 40–53)
HGB BLD-MCNC: 9.6 G/DL (ref 13.3–17.7)
HGB BLD-MCNC: 9.9 G/DL (ref 13.3–17.7)
HGB UR QL STRIP: NEGATIVE
HYALINE CASTS: 4 /LPF
IMM GRANULOCYTES # BLD: 0.6 10E3/UL
IMM GRANULOCYTES NFR BLD: 4 %
KETONES UR STRIP-MCNC: NEGATIVE MG/DL
LEUKOCYTE ESTERASE UR QL STRIP: NEGATIVE
LYMPHOCYTES # BLD AUTO: 1.8 10E3/UL (ref 0.8–5.3)
LYMPHOCYTES # BLD MANUAL: 1.2 10E3/UL (ref 0.8–5.3)
LYMPHOCYTES NFR BLD AUTO: 12 %
LYMPHOCYTES NFR BLD MANUAL: 10 %
MAGNESIUM SERPL-MCNC: 1.6 MG/DL (ref 1.6–2.3)
MCH RBC QN AUTO: 32.1 PG (ref 26.5–33)
MCH RBC QN AUTO: 33.3 PG (ref 26.5–33)
MCHC RBC AUTO-ENTMCNC: 33.7 G/DL (ref 31.5–36.5)
MCHC RBC AUTO-ENTMCNC: 34.5 G/DL (ref 31.5–36.5)
MCV RBC AUTO: 95 FL (ref 78–100)
MCV RBC AUTO: 97 FL (ref 78–100)
METAMYELOCYTES # BLD MANUAL: 0.1 10E3/UL
METAMYELOCYTES NFR BLD MANUAL: 1 %
MONOCYTES # BLD AUTO: 1.6 10E3/UL (ref 0–1.3)
MONOCYTES # BLD MANUAL: 0.2 10E3/UL (ref 0–1.3)
MONOCYTES NFR BLD AUTO: 11 %
MONOCYTES NFR BLD MANUAL: 2 %
MUCOUS THREADS #/AREA URNS LPF: PRESENT /LPF
MYELOCYTES # BLD MANUAL: 0.1 10E3/UL
MYELOCYTES NFR BLD MANUAL: 1 %
NEUTROPHILS # BLD AUTO: 10.8 10E3/UL (ref 1.6–8.3)
NEUTROPHILS # BLD MANUAL: 10.3 10E3/UL (ref 1.6–8.3)
NEUTROPHILS NFR BLD AUTO: 72 %
NEUTROPHILS NFR BLD MANUAL: 84 %
NITRATE UR QL: NEGATIVE
NRBC # BLD AUTO: 0 10E3/UL
NRBC BLD AUTO-RTO: 0 /100
O2/TOTAL GAS SETTING VFR VENT: 45 %
PCO2 BLDV: 39 MM HG (ref 40–50)
PH BLDV: 7.45 [PH] (ref 7.32–7.43)
PH UR STRIP: 5 [PH] (ref 5–7)
PHOSPHATE SERPL-MCNC: 3.1 MG/DL (ref 2.5–4.5)
PLASMA CELLS # BLD MANUAL: 0.2 10E3/UL
PLASMA CELLS NFR BLD MANUAL: 2 %
PLAT MORPH BLD: ABNORMAL
PLAT MORPH BLD: NORMAL
PLATELET # BLD AUTO: 158 10E3/UL (ref 150–450)
PLATELET # BLD AUTO: 172 10E3/UL (ref 150–450)
PO2 BLDV: 45 MM HG (ref 25–47)
POTASSIUM BLD-SCNC: 3.4 MMOL/L (ref 3.4–5.3)
POTASSIUM BLD-SCNC: 4.3 MMOL/L (ref 3.4–5.3)
PROT SERPL-MCNC: 5.9 G/DL (ref 6.8–8.8)
RBC # BLD AUTO: 2.97 10E6/UL (ref 4.4–5.9)
RBC # BLD AUTO: 2.99 10E6/UL (ref 4.4–5.9)
RBC MORPH BLD: ABNORMAL
RBC MORPH BLD: NORMAL
RBC URINE: <1 /HPF
SCANNED LAB RESULT: NORMAL
SODIUM SERPL-SCNC: 139 MMOL/L (ref 133–144)
SP GR UR STRIP: 1.01 (ref 1–1.03)
UROBILINOGEN UR STRIP-MCNC: NORMAL MG/DL
WBC # BLD AUTO: 12.3 10E3/UL (ref 4–11)
WBC # BLD AUTO: 12.3 10E3/UL (ref 4–11)
WBC # BLD AUTO: 15 10E3/UL (ref 4–11)
WBC URINE: <1 /HPF

## 2022-01-11 PROCEDURE — 250N000009 HC RX 250: Performed by: STUDENT IN AN ORGANIZED HEALTH CARE EDUCATION/TRAINING PROGRAM

## 2022-01-11 PROCEDURE — 999N000157 HC STATISTIC RCP TIME EA 10 MIN

## 2022-01-11 PROCEDURE — 250N000011 HC RX IP 250 OP 636

## 2022-01-11 PROCEDURE — 84132 ASSAY OF SERUM POTASSIUM: CPT | Performed by: SURGERY

## 2022-01-11 PROCEDURE — 87205 SMEAR GRAM STAIN: CPT

## 2022-01-11 PROCEDURE — 74018 RADEX ABDOMEN 1 VIEW: CPT

## 2022-01-11 PROCEDURE — 250N000013 HC RX MED GY IP 250 OP 250 PS 637

## 2022-01-11 PROCEDURE — 250N000013 HC RX MED GY IP 250 OP 250 PS 637: Performed by: STUDENT IN AN ORGANIZED HEALTH CARE EDUCATION/TRAINING PROGRAM

## 2022-01-11 PROCEDURE — 83735 ASSAY OF MAGNESIUM: CPT | Performed by: STUDENT IN AN ORGANIZED HEALTH CARE EDUCATION/TRAINING PROGRAM

## 2022-01-11 PROCEDURE — 94640 AIRWAY INHALATION TREATMENT: CPT | Mod: 76

## 2022-01-11 PROCEDURE — 200N000002 HC R&B ICU UMMC

## 2022-01-11 PROCEDURE — 250N000013 HC RX MED GY IP 250 OP 250 PS 637: Performed by: SURGERY

## 2022-01-11 PROCEDURE — 250N000012 HC RX MED GY IP 250 OP 636 PS 637: Performed by: STUDENT IN AN ORGANIZED HEALTH CARE EDUCATION/TRAINING PROGRAM

## 2022-01-11 PROCEDURE — 71045 X-RAY EXAM CHEST 1 VIEW: CPT | Mod: 26 | Performed by: RADIOLOGY

## 2022-01-11 PROCEDURE — 250N000011 HC RX IP 250 OP 636: Performed by: STUDENT IN AN ORGANIZED HEALTH CARE EDUCATION/TRAINING PROGRAM

## 2022-01-11 PROCEDURE — 258N000003 HC RX IP 258 OP 636: Performed by: STUDENT IN AN ORGANIZED HEALTH CARE EDUCATION/TRAINING PROGRAM

## 2022-01-11 PROCEDURE — 85027 COMPLETE CBC AUTOMATED: CPT | Performed by: STUDENT IN AN ORGANIZED HEALTH CARE EDUCATION/TRAINING PROGRAM

## 2022-01-11 PROCEDURE — 71045 X-RAY EXAM CHEST 1 VIEW: CPT

## 2022-01-11 PROCEDURE — 94003 VENT MGMT INPAT SUBQ DAY: CPT

## 2022-01-11 PROCEDURE — 87040 BLOOD CULTURE FOR BACTERIA: CPT | Performed by: SURGERY

## 2022-01-11 PROCEDURE — 82803 BLOOD GASES ANY COMBINATION: CPT | Performed by: STUDENT IN AN ORGANIZED HEALTH CARE EDUCATION/TRAINING PROGRAM

## 2022-01-11 PROCEDURE — 82330 ASSAY OF CALCIUM: CPT | Performed by: STUDENT IN AN ORGANIZED HEALTH CARE EDUCATION/TRAINING PROGRAM

## 2022-01-11 PROCEDURE — 84100 ASSAY OF PHOSPHORUS: CPT | Performed by: STUDENT IN AN ORGANIZED HEALTH CARE EDUCATION/TRAINING PROGRAM

## 2022-01-11 PROCEDURE — 258N000003 HC RX IP 258 OP 636

## 2022-01-11 PROCEDURE — 250N000009 HC RX 250: Performed by: SURGERY

## 2022-01-11 PROCEDURE — 74018 RADEX ABDOMEN 1 VIEW: CPT | Mod: 26 | Performed by: RADIOLOGY

## 2022-01-11 PROCEDURE — 99291 CRITICAL CARE FIRST HOUR: CPT | Mod: GC | Performed by: INTERNAL MEDICINE

## 2022-01-11 PROCEDURE — 36415 COLL VENOUS BLD VENIPUNCTURE: CPT | Performed by: SURGERY

## 2022-01-11 PROCEDURE — 94640 AIRWAY INHALATION TREATMENT: CPT

## 2022-01-11 PROCEDURE — 81001 URINALYSIS AUTO W/SCOPE: CPT

## 2022-01-11 PROCEDURE — 82040 ASSAY OF SERUM ALBUMIN: CPT | Performed by: STUDENT IN AN ORGANIZED HEALTH CARE EDUCATION/TRAINING PROGRAM

## 2022-01-11 PROCEDURE — 80053 COMPREHEN METABOLIC PANEL: CPT | Performed by: STUDENT IN AN ORGANIZED HEALTH CARE EDUCATION/TRAINING PROGRAM

## 2022-01-11 RX ORDER — QUETIAPINE FUMARATE 50 MG/1
50 TABLET, FILM COATED ORAL 2 TIMES DAILY
Status: DISCONTINUED | OUTPATIENT
Start: 2022-01-11 | End: 2022-01-13

## 2022-01-11 RX ORDER — POTASSIUM CHLORIDE 20MEQ/15ML
40 LIQUID (ML) ORAL ONCE
Status: COMPLETED | OUTPATIENT
Start: 2022-01-11 | End: 2022-01-11

## 2022-01-11 RX ORDER — FUROSEMIDE 10 MG/ML
40 INJECTION INTRAMUSCULAR; INTRAVENOUS ONCE
Status: COMPLETED | OUTPATIENT
Start: 2022-01-11 | End: 2022-01-11

## 2022-01-11 RX ORDER — ACETYLCYSTEINE 100 MG/ML
4 SOLUTION ORAL; RESPIRATORY (INHALATION) 3 TIMES DAILY
Status: DISCONTINUED | OUTPATIENT
Start: 2022-01-11 | End: 2022-01-13

## 2022-01-11 RX ORDER — ALBUTEROL SULFATE 0.83 MG/ML
2.5 SOLUTION RESPIRATORY (INHALATION)
Status: DISCONTINUED | OUTPATIENT
Start: 2022-01-11 | End: 2022-01-15

## 2022-01-11 RX ADMIN — Medication 40 MG: at 10:03

## 2022-01-11 RX ADMIN — FUROSEMIDE 40 MG: 10 INJECTION, SOLUTION INTRAVENOUS at 16:40

## 2022-01-11 RX ADMIN — CALCIUM CHLORIDE 2 G: 100 INJECTION, SOLUTION INTRAVENOUS at 05:54

## 2022-01-11 RX ADMIN — ENOXAPARIN SODIUM 40 MG: 40 INJECTION SUBCUTANEOUS at 08:36

## 2022-01-11 RX ADMIN — ACETYLCYSTEINE 4 ML: 100 SOLUTION ORAL; RESPIRATORY (INHALATION) at 14:57

## 2022-01-11 RX ADMIN — CEFTRIAXONE SODIUM 2 G: 2 INJECTION, POWDER, FOR SOLUTION INTRAMUSCULAR; INTRAVENOUS at 09:29

## 2022-01-11 RX ADMIN — ALBUTEROL SULFATE 2.5 MG: 2.5 SOLUTION RESPIRATORY (INHALATION) at 08:29

## 2022-01-11 RX ADMIN — ALTEPLASE 2 MG: 2.2 INJECTION, POWDER, LYOPHILIZED, FOR SOLUTION INTRAVENOUS at 13:22

## 2022-01-11 RX ADMIN — ALBUTEROL SULFATE 2.5 MG: 2.5 SOLUTION RESPIRATORY (INHALATION) at 20:25

## 2022-01-11 RX ADMIN — PROPOFOL 30 MCG/KG/MIN: 10 INJECTION, EMULSION INTRAVENOUS at 03:52

## 2022-01-11 RX ADMIN — Medication 50 MCG: at 21:27

## 2022-01-11 RX ADMIN — REMDESIVIR 100 MG: 100 INJECTION, POWDER, LYOPHILIZED, FOR SOLUTION INTRAVENOUS at 17:55

## 2022-01-11 RX ADMIN — ALBUTEROL SULFATE 2.5 MG: 2.5 SOLUTION RESPIRATORY (INHALATION) at 00:13

## 2022-01-11 RX ADMIN — Medication 1 PACKET: at 08:38

## 2022-01-11 RX ADMIN — ACETYLCYSTEINE 4 ML: 100 SOLUTION ORAL; RESPIRATORY (INHALATION) at 08:29

## 2022-01-11 RX ADMIN — ALBUTEROL SULFATE 2.5 MG: 2.5 SOLUTION RESPIRATORY (INHALATION) at 14:56

## 2022-01-11 RX ADMIN — ACETAMINOPHEN 650 MG: 325 TABLET, FILM COATED ORAL at 08:36

## 2022-01-11 RX ADMIN — QUETIAPINE FUMARATE 50 MG: 50 TABLET ORAL at 11:53

## 2022-01-11 RX ADMIN — FENTANYL CITRATE 100 MCG/HR: 50 INJECTION INTRAVENOUS at 09:31

## 2022-01-11 RX ADMIN — FUROSEMIDE 40 MG: 10 INJECTION, SOLUTION INTRAVENOUS at 08:36

## 2022-01-11 RX ADMIN — SODIUM CHLORIDE 50 ML: 9 INJECTION, SOLUTION INTRAVENOUS at 18:29

## 2022-01-11 RX ADMIN — FOLIC ACID 1 MG: 1 TABLET ORAL at 08:36

## 2022-01-11 RX ADMIN — PROPOFOL 15 MCG/KG/MIN: 10 INJECTION, EMULSION INTRAVENOUS at 13:22

## 2022-01-11 RX ADMIN — DEXMEDETOMIDINE HYDROCHLORIDE 0.9 MCG/KG/HR: 400 INJECTION INTRAVENOUS at 14:23

## 2022-01-11 RX ADMIN — Medication 1 PACKET: at 20:14

## 2022-01-11 RX ADMIN — THERA TABS 1 TABLET: TAB at 08:36

## 2022-01-11 RX ADMIN — STANDARDIZED SENNA CONCENTRATE 8.6 MG: 8.6 TABLET ORAL at 08:36

## 2022-01-11 RX ADMIN — DEXMEDETOMIDINE HYDROCHLORIDE 0.9 MCG/KG/HR: 400 INJECTION INTRAVENOUS at 20:14

## 2022-01-11 RX ADMIN — THIAMINE HYDROCHLORIDE 250 MG: 100 INJECTION, SOLUTION INTRAMUSCULAR; INTRAVENOUS at 08:36

## 2022-01-11 RX ADMIN — DEXAMETHASONE SODIUM PHOSPHATE 6 MG: 4 INJECTION, SOLUTION INTRA-ARTICULAR; INTRALESIONAL; INTRAMUSCULAR; INTRAVENOUS; SOFT TISSUE at 13:23

## 2022-01-11 RX ADMIN — Medication 1 PACKET: at 13:23

## 2022-01-11 RX ADMIN — POTASSIUM CHLORIDE 40 MEQ: 40 SOLUTION ORAL at 05:00

## 2022-01-11 RX ADMIN — ACETYLCYSTEINE 4 ML: 100 SOLUTION ORAL; RESPIRATORY (INHALATION) at 20:26

## 2022-01-11 RX ADMIN — DEXMEDETOMIDINE HYDROCHLORIDE 0.9 MCG/KG/HR: 400 INJECTION INTRAVENOUS at 08:38

## 2022-01-11 RX ADMIN — QUETIAPINE FUMARATE 50 MG: 50 TABLET ORAL at 20:14

## 2022-01-11 RX ADMIN — DEXMEDETOMIDINE HYDROCHLORIDE 0.8 MCG/KG/HR: 400 INJECTION INTRAVENOUS at 02:32

## 2022-01-11 RX ADMIN — ENOXAPARIN SODIUM 40 MG: 40 INJECTION SUBCUTANEOUS at 20:14

## 2022-01-11 RX ADMIN — INSULIN HUMAN 4 UNITS: 100 INJECTION, SUSPENSION SUBCUTANEOUS at 16:39

## 2022-01-11 ASSESSMENT — ACTIVITIES OF DAILY LIVING (ADL)
ADLS_ACUITY_SCORE: 14
ADLS_ACUITY_SCORE: 19
ADLS_ACUITY_SCORE: 14
ADLS_ACUITY_SCORE: 15
ADLS_ACUITY_SCORE: 14
ADLS_ACUITY_SCORE: 15
ADLS_ACUITY_SCORE: 14
ADLS_ACUITY_SCORE: 15
ADLS_ACUITY_SCORE: 14
ADLS_ACUITY_SCORE: 19
ADLS_ACUITY_SCORE: 15
ADLS_ACUITY_SCORE: 14
ADLS_ACUITY_SCORE: 15
ADLS_ACUITY_SCORE: 19
ADLS_ACUITY_SCORE: 15
ADLS_ACUITY_SCORE: 14
ADLS_ACUITY_SCORE: 19
ADLS_ACUITY_SCORE: 14
ADLS_ACUITY_SCORE: 15
ADLS_ACUITY_SCORE: 19

## 2022-01-11 ASSESSMENT — MIFFLIN-ST. JEOR: SCORE: 1720.25

## 2022-01-11 NOTE — PLAN OF CARE
ICU End of Shift Summary. See flowsheets for vital signs and detailed assessment.    Changes this shift: Pt alert, following commands, remains in mitts and wrist restraints for pulling at lines and ETT. Tmax 102.8, pan culture, tylenol given and cooling measures initiated, follow up temp 99.4. PS 15 min before vent self-turned back on for apnea. One bowel movement. Diuresed with 40mg Lasix, good UO. Blood sugar remains in high 200s, sliding scale adjusted to high intensity and NPH added.     Plan: Pressure support tomorrow. Continue plan of care.    Problem: Adult Inpatient Plan of Care  Goal: Absence of Hospital-Acquired Illness or Injury  Intervention: Prevent Skin Injury  Recent Flowsheet Documentation  Taken 1/11/2022 1400 by Hollie Calderón RN  Body Position:    turned    right  Taken 1/11/2022 1200 by Hollie Calderón RN  Body Position:    turned    left  Taken 1/11/2022 1000 by Hollie Calderón RN  Body Position:    turned    right  Taken 1/11/2022 0800 by Hollie Calderón RN  Body Position:    turned    left  Intervention: Prevent and Manage VTE (Venous Thromboembolism) Risk  Recent Flowsheet Documentation  Taken 1/11/2022 1200 by Hollie Calderón RN  VTE Prevention/Management:    anticoagulant therapy maintained    PROM (passive range of motion) performed    AROM (active range of motion) performed  Taken 1/11/2022 0800 by Hollie Calderón RN  VTE Prevention/Management:    anticoagulant therapy maintained    PROM (passive range of motion) performed    AROM (active range of motion) performed  Intervention: Prevent Infection  Recent Flowsheet Documentation  Taken 1/11/2022 1200 by Hollie Calderón RN  Infection Prevention:    single patient room provided    rest/sleep promoted  Taken 1/11/2022 0800 by Hollie Calderón RN  Infection Prevention:    single patient room provided    rest/sleep promoted     Problem: Risk for Delirium  Goal: Optimal Coping  Intervention: Optimize Psychosocial  Adjustment to Delirium  Recent Flowsheet Documentation  Taken 1/11/2022 0800 by Hollie Calderón RN  Supportive Measures: relaxation techniques promoted  Goal: Improved Behavioral Control  Intervention: Prevent and Manage Agitation  Recent Flowsheet Documentation  Taken 1/11/2022 0800 by Hollie Calderón RN  Environment Familiarity/Consistency: daily routine followed  Goal: Improved Attention and Thought Clarity  Intervention: Maximize Cognitive Function  Recent Flowsheet Documentation  Taken 1/11/2022 0800 by Hollie Calderón RN  Sensory Stimulation Regulation: quiet environment promoted  Reorientation Measures:    calendar in view    clock in view    reorientation provided  Goal: Improved Sleep  Intervention: Promote Sleep  Recent Flowsheet Documentation  Taken 1/11/2022 0800 by Hollie Calderón RN  Sleep/Rest Enhancement:    natural light exposure provided    regular sleep/rest pattern promoted    relaxation techniques promoted     Problem: Alcohol Withdrawal  Goal: Alcohol Withdrawal Symptom Control  Intervention: Minimize or Manage Alcohol Withdrawal Symptoms  Recent Flowsheet Documentation  Taken 1/11/2022 0800 by Hollie Calderón RN  Sensory Stimulation Regulation: quiet environment promoted  Aspiration Precautions: oral hygiene care promoted  Seizure Precautions: activity supervised     Problem: Adjustment to Illness (Sepsis/Septic Shock)  Goal: Optimal Coping  Intervention: Optimize Psychosocial Adjustment to Illness  Recent Flowsheet Documentation  Taken 1/11/2022 0800 by Hollie Calderón RN  Supportive Measures: relaxation techniques promoted     Problem: Bleeding (Sepsis/Septic Shock)  Goal: Absence of Bleeding  Intervention: Monitor and Manage Bleeding  Recent Flowsheet Documentation  Taken 1/11/2022 0800 by Hollie Calderón RN  Bleeding Precautions: monitored for signs of bleeding     Problem: Glycemic Control Impaired (Sepsis/Septic Shock)  Goal: Blood Glucose Level Within Desired  Range  Intervention: Optimize Glycemic Control  Recent Flowsheet Documentation  Taken 1/11/2022 0800 by Hollie Calderón RN  Glycemic Management: blood glucose monitored     Problem: Infection Progression (Sepsis)  Goal: Absence of Infection Signs and Symptoms  Intervention: Initiate Sepsis Management  Recent Flowsheet Documentation  Taken 1/11/2022 1200 by Hollie Calderón RN  Infection Prevention:    single patient room provided    rest/sleep promoted  Isolation Precautions:    airborne precautions maintained    contact precautions maintained  Taken 1/11/2022 0800 by Hollie Calderón RN  Infection Prevention:    single patient room provided    rest/sleep promoted  Isolation Precautions:    airborne precautions maintained    contact precautions maintained  Intervention: Promote Stabilization  Recent Flowsheet Documentation  Taken 1/11/2022 0800 by Hollie Calderón RN  Lung Protection Measures: lung compliance monitored  Fluid/Electrolyte Management: electrolyte supplement adjusted  Intervention: Promote Recovery  Recent Flowsheet Documentation  Taken 1/11/2022 1200 by Hollie Calderón RN  Activity Management: activity adjusted per tolerance  Taken 1/11/2022 0800 by Hollie Calderón RN  Airway/Ventilation Support: comfort measures provided  Sleep/Rest Enhancement:    natural light exposure provided    regular sleep/rest pattern promoted    relaxation techniques promoted  Activity Management: activity adjusted per tolerance     Problem: Nutrition Impaired (Sepsis/Septic Shock)  Goal: Optimal Nutrition Intake  Intervention: Promote and Optimize Nutrition Delivery  Recent Flowsheet Documentation  Taken 1/11/2022 0800 by Hollie Calderón RN  Nutrition Support Management: weight trending reviewed

## 2022-01-11 NOTE — PLAN OF CARE
ICU End of Shift Summary. See flowsheets for vital signs and detailed assessment.     Changes this shift: wrist restraints ordered d/t pulling at ETT, inconsistently following commands, occasionally HTN, increased sedation due to agitation, SR/ST, adequate urine output, 1 small BM, K+ replaced, calcium replaced,  R PICC, L PIV     Plan: monitor respiratory status and wean as tolerated, monitor hemodynamics

## 2022-01-11 NOTE — PROGRESS NOTES
MEDICAL ICU PROGRESS NOTE  01/11/2022    Date of Service (when I saw the patient): 01/11/2022    ASSESSMENT: Brent Lau is a 41 year old male with PMH of tobacco use, alcohol use, and GERD who was admitted to the MICU as a transfer from OSH on 1/6/22 due to shock and acute hypoxic respiratory failure found to have strep pneumo PNA and MSSA bacteremia.    CHANGES and MAJOR THINGS TODAY:   - Schedule Seroquel  - New fever, will pan culture and obtain CXR and AXR.   - Broaden abx if fever persistent   - Lasix 40mg x1, check UO this afternoon for additional dosing  - Starting NPH for additional coverage while on Decadron. Increase to HDSSI.    PLAN:    Neuro:  # Pain and Sedation  RAAS -1 to -2.   - continue to wean propofol.  - continue Precedex.  - Seroquel 50 mg BID scheduled.  - Fentanyl     # Alcohol use disorder  Per wife, patient has been drinking up to 1 L of vodka/day since Jan 28, 2021 after the sudden death of his sister. BAL elevated on OSH ED arrival. No charted or reported history of alcohol withdrawal/seizures; wife thinks there is a significant component of depression and would like him to be connected to a PCP at [facility] for mental health resources +/- psych meds at the time of discharge. Given Phenobarbitol load 1/9/22.  - Folic acid and Multivitamin Daily  - Thiamine per Wernicke protocol (250 mg daily x5 days, then 100 mg daily thereafter)  - Chemical Dependency consult when extubated      Pulmonary:  # Acute Hypoxic Respiratory Failure  # Strep Pneumo PNA  # MSSA bacteremia  # COVID infection  Patient with dense consolidative infiltrates in the lower lobes on CT c/f significant pneumonia found to have strep pneumo on blood and sputum culture as well as antigen. Extubated 1/8 and re intubated, however no documentation was made of indications. Ongoing thick secretions.  - Albuterol 2.5mg q4h nebulizer  - Mucomyst q4h  - Antibiotics as below, currently Ceftriaxone 2g  q24h    Ventilation Mode: CMV/AC  (Continuous Mandatory Ventilation/ Assist Control)  FiO2 (%): 45 %  Rate Set (breaths/minute): 22 breaths/min  Tidal Volume Set (mL): 450 mL  PEEP (cm H2O): 7 cmH2O  Pressure Support (cm H2O): 7 cmH2O  Oxygen Concentration (%): 45 %  Resp: 11     Cardiovascular:  # Septic shock, resolved  Likely in the setting of hypovolemia v distributive/vasoplegic v other. Got 2.25 L at OSH and 2L within first few hours of transfer.   - MAP goal >65   - ECHO with EF of 30-35% and moderate diffuse hypokinesis    # Hypertension  - Hydralazine 10mg prn for BP >180/>110     GI/Nutrition:  # Nutrition  - at goal TF @ 60mL/hr     # GERD  - PPI for stress ulcer prophylaxis      # Bowel Regimen   - Senna scheduled  - Miralax PRN      Renal/Electrolytes:  # Metabolic Acidosis, resolved  # Alcoholic Ketosis, resolved  # Lactic Acidosis, resolved  Adequate respiratory compensation. Lactate 10.5 on ED arrival, improved after some fluid resuscitation. Ketones elevated, likely in the setting of acute alcohol intoxication. CK normal.     # Volume management  Goal net negative 0.5-1L daily.  - 40 mg IVP furosemide today. Reassess in afternoon.    # Hypokalemia  # Hypomagnesemia  # Hypocalcemia   - daily labs; replete PRN     Infectious Disease:  # Sepsis   # Strep Pneumo PNA  # MSSA bacteremia  Patient had positive strep pneumo on BCx and Sputum Cx with MSSA on sputum cx. Legionella, Covid, influenza, Histo, Blasto RSV, and MRSA negative.      # New-onset Fever  Tmax 102.8 on 1/11 a.m. Already on ceftriaxone for MSSA bacteremia. Respiratory status has not worsened. Abd slightly distended, but stable from previous exams.  - CXR and AXR.  - UA, BCx x2 and sputum Cx.  - add-on diff to assess for eosinophils.  - Will hold on broadening abx unless fever persists or findings suggestive of untreated infection.    Antimicrobials:  Ceftriaxone 1/6 - present (Day 4/14)  Zosyn 1/5- D'karena same day  Vancomycin 1/5- D'karena  same day  Azithromycin 1/5- D'karena same day    1/5/22 2/2 bcx + Strep pneumo  1/6/22 sputum cx + Strep pneumo, Staph aureus  1/6/22 bcx + Staph aureus  1/7/22 bcx NGTD  1/8/22 bcx NGTD  1/9/22 bcx NGTD  1/9/22 ucx NGTD  1/9/22 sputum cx NGTD     # COVID-19  S/p 1 Pfizer vaccine 11/17/21. COVID infection approx 11/25/21. Negative tests 1/5 and 1/6; found to be positive 1/9/22 in the setting of clinically worsening.  - Dexamethasone 6mg for 10-day course (1/9/22- )  - 5 day Remdesevir course (1/9/22- )  - Lovenox 40mg BID    Hematology:  # Acute Thrombocytopenia   # Acute Normocytic Anemia, stable  Likely in the setting of some likely alcoholic liver disease and some suppression in the setting of acute infection. No acute signs of bleeding on admission or during stay. HgB today 9.9.  - Lovenox 40mg BID  - daily CBC  - Hb goal > 7      Endocrine:  No history of DM. Increasing BG after initiating Dexamethasone 1/9. BG's in 200-300 rabge, Started low dose sliding scale insulin on 1/10.  - Start NPH 4 units daily around time of Decadron administration.  - Increase sliding scale to high resistance dosing.      MSK:  No active issues  - PT/OT consulted     Skin:  No active issues    General Cares/Prophylaxis:    DVT Prophylaxis: Lovenox 40mg BID; COVID dosing  GI Prophylaxis: PPI  Restraints: chemical, soft 4 point  Family Communication: updated wife, Zeynep, on the phone 1/11/22.  Code Status: Full Code    Lines/tubes/drains:  - PICC 1/6  - ETT 1/8  - Villatoro 1/9  - OG 1/8    Disposition:  - Medical ICU    Patient seen and findings/plan discussed with medical ICU staff, Dr. Lalita Quiroz  rheumatology fellow  p:[phone]      Attending note:  Patient seen, examined and discussed with the Resident physicians. All data reviewed including vital signs, medications, laboratory studies and imaging.  I agree with the assessment and plan as outlined in the above note.  The patient remains critically ill with acute  respiratory failure, alcohol abuse syndrome, encephalopathy, and alcoholic liver disease.  I personally adjusted the ventilator to treat the acute respiratory failure, managed the vasopressors for shock, and managed medications for his encephalopathy.  Decreasing ventilator support- will assess for wean trials. Fever curve was decreasing but spike this morning, will obtain CXR to assess for new pleural effusion or infiltrate.     Total Critical Care time excluding time for teaching and procedures was 40 minutes.     Elenita Celis MD  [phone]  ====================================  INTERVAL HISTORY:   Reviewed overnight nursing notes. No acute events. Isolated fever this morning.    OBJECTIVE:   1. VITAL SIGNS:   Temp:  [98.7  F (37.1  C)-99.4  F (37.4  C)] 99.4  F (37.4  C)  Pulse:  [] 95  Resp:  [10-31] 11  BP: (124-156)/() 139/98  Cuff Mean (mmHg):  [115] 115  FiO2 (%):  [45 %] 45 %  SpO2:  [91 %-100 %] 99 %  Ventilation Mode: CMV/AC  (Continuous Mandatory Ventilation/ Assist Control)  FiO2 (%): 45 %  Rate Set (breaths/minute): 22 breaths/min  Tidal Volume Set (mL): 450 mL  PEEP (cm H2O): 7 cmH2O  Pressure Support (cm H2O): 7 cmH2O  Oxygen Concentration (%): 45 %  Resp: 11    2. INTAKE/ OUTPUT:   I/O last 3 completed shifts:  In: 3275.7 [I.V.:1430.7; NG/GT:465]  Out: 4120 [Urine:4120]    3. PHYSICAL EXAMINATION:  General: supine, comfortably laying in bed  Neuro: sedated, not following commands  Pulm/Resp: mechanical breath sounds, no appreciable rhonchi, crackles, or wheeze  CV: RRR  Abdomen: Soft, slight-distention, does not appear tender, non-tympanitic  :  newberry catheter in place, urine yellow and clear  Incisions/Skin: no rash or lesions visible  Lymph: no LE edema    4. LABS:   Arterial Blood Gases   Recent Labs   Lab 01/09/22  1740 01/06/22  1611 01/06/22  0234 01/05/22 2113   PH 7.50* 7.57* 7.40 7.28*   PCO2 34* 26* 31* 33*   PO2 84 91 111* 89   HCO3 26 24 19* 16*     Complete Blood Count    Recent Labs   Lab 01/11/22  0338 01/10/22  0511 01/09/22 0349 01/08/22 0412   WBC 12.3* 7.3 5.6 3.3*   HGB 9.6* 9.9* 10.1* 10.1*    111* 58* 42*     Basic Metabolic Panel  Recent Labs   Lab 01/11/22 0346 01/11/22 0338 01/11/22  0013 01/10/22  2023 01/10/22  1834 01/10/22  0349 01/10/22  0344 01/10/22  0032 01/09/22  0855 01/09/22 0350 01/09/22 0349   NA  --  139  --   --  133  --  128*  --   --   --  138   POTASSIUM  --  3.4  --   --  3.8  --  3.6  --  3.5  --  3.4   CHLORIDE  --  107  --   --  101  --  99  --   --   --  105   CO2  --  26  --   --  24  --  23  --   --   --  26   BUN  --  15  --   --  15  --  13  --   --   --  14   CR  --  0.56*  --   --  0.62*  --  0.56*  --   --   --  0.66   * 268* 301* 358* 330*   < > 212*   < >  --    < > 158*    < > = values in this interval not displayed.     Liver Function Tests  Recent Labs   Lab 01/11/22  0338 01/10/22  0344 01/09/22  0349 01/08/22  0412 01/05/22  1730 01/05/22  1357   * 97* 142* 211*   < >  --    ALT 73* 42 47 50   < >  --    ALKPHOS 116 107 138 115   < >  --    BILITOTAL 0.3 0.6 0.7 0.7   < >  --    ALBUMIN 1.2* 1.2* 1.4* 1.2*   < >  --    INR  --   --   --   --   --  1.08    < > = values in this interval not displayed.     Coagulation Profile  Recent Labs   Lab 01/05/22  1357   INR 1.08   PTT 37       5. RADIOLOGY:   No results found for this or any previous visit (from the past 24 hour(s)).

## 2022-01-12 ENCOUNTER — APPOINTMENT (OUTPATIENT)
Dept: PHYSICAL THERAPY | Facility: CLINIC | Age: 42
DRG: 870 | End: 2022-01-12
Attending: SURGERY
Payer: COMMERCIAL

## 2022-01-12 LAB
ALBUMIN SERPL-MCNC: 1.4 G/DL (ref 3.4–5)
ALP SERPL-CCNC: 114 U/L (ref 40–150)
ALT SERPL W P-5'-P-CCNC: 61 U/L (ref 0–70)
ANION GAP SERPL CALCULATED.3IONS-SCNC: 6 MMOL/L (ref 3–14)
AST SERPL W P-5'-P-CCNC: 50 U/L (ref 0–45)
BACTERIA BLD CULT: NO GROWTH
BACTERIA BLD CULT: NO GROWTH
BASE EXCESS BLDV CALC-SCNC: 5.8 MMOL/L (ref -7.7–1.9)
BILIRUB SERPL-MCNC: 0.3 MG/DL (ref 0.2–1.3)
BUN SERPL-MCNC: 16 MG/DL (ref 7–30)
CA-I BLD-MCNC: 4.5 MG/DL (ref 4.4–5.2)
CALCIUM SERPL-MCNC: 8.1 MG/DL (ref 8.5–10.1)
CHLORIDE BLD-SCNC: 104 MMOL/L (ref 94–109)
CO2 SERPL-SCNC: 28 MMOL/L (ref 20–32)
CREAT SERPL-MCNC: 0.6 MG/DL (ref 0.66–1.25)
ERYTHROCYTE [DISTWIDTH] IN BLOOD BY AUTOMATED COUNT: 13.5 % (ref 10–15)
GFR SERPL CREATININE-BSD FRML MDRD: >90 ML/MIN/1.73M2
GLUCOSE BLD-MCNC: 271 MG/DL (ref 70–99)
GLUCOSE BLDC GLUCOMTR-MCNC: 211 MG/DL (ref 70–99)
GLUCOSE BLDC GLUCOMTR-MCNC: 229 MG/DL (ref 70–99)
GLUCOSE BLDC GLUCOMTR-MCNC: 234 MG/DL (ref 70–99)
GLUCOSE BLDC GLUCOMTR-MCNC: 251 MG/DL (ref 70–99)
GLUCOSE BLDC GLUCOMTR-MCNC: 270 MG/DL (ref 70–99)
GLUCOSE BLDC GLUCOMTR-MCNC: 271 MG/DL (ref 70–99)
GLUCOSE BLDC GLUCOMTR-MCNC: 292 MG/DL (ref 70–99)
HCO3 BLDV-SCNC: 30 MMOL/L (ref 21–28)
HCT VFR BLD AUTO: 29.2 % (ref 40–53)
HGB BLD-MCNC: 9.8 G/DL (ref 13.3–17.7)
MAGNESIUM SERPL-MCNC: 1.4 MG/DL (ref 1.6–2.3)
MCH RBC QN AUTO: 32.3 PG (ref 26.5–33)
MCHC RBC AUTO-ENTMCNC: 33.6 G/DL (ref 31.5–36.5)
MCV RBC AUTO: 96 FL (ref 78–100)
O2/TOTAL GAS SETTING VFR VENT: 50 %
PCO2 BLDV: 43 MM HG (ref 40–50)
PH BLDV: 7.46 [PH] (ref 7.32–7.43)
PHOSPHATE SERPL-MCNC: 3.7 MG/DL (ref 2.5–4.5)
PLATELET # BLD AUTO: 296 10E3/UL (ref 150–450)
PO2 BLDV: 40 MM HG (ref 25–47)
POTASSIUM BLD-SCNC: 3.3 MMOL/L (ref 3.4–5.3)
PROCALCITONIN SERPL-MCNC: 11.53 NG/ML
PROT SERPL-MCNC: 6.3 G/DL (ref 6.8–8.8)
RBC # BLD AUTO: 3.03 10E6/UL (ref 4.4–5.9)
SODIUM SERPL-SCNC: 138 MMOL/L (ref 133–144)
WBC # BLD AUTO: 17 10E3/UL (ref 4–11)

## 2022-01-12 PROCEDURE — 82803 BLOOD GASES ANY COMBINATION: CPT | Performed by: STUDENT IN AN ORGANIZED HEALTH CARE EDUCATION/TRAINING PROGRAM

## 2022-01-12 PROCEDURE — 258N000003 HC RX IP 258 OP 636

## 2022-01-12 PROCEDURE — 250N000011 HC RX IP 250 OP 636: Performed by: STUDENT IN AN ORGANIZED HEALTH CARE EDUCATION/TRAINING PROGRAM

## 2022-01-12 PROCEDURE — 250N000011 HC RX IP 250 OP 636: Performed by: SURGERY

## 2022-01-12 PROCEDURE — 200N000002 HC R&B ICU UMMC

## 2022-01-12 PROCEDURE — 250N000009 HC RX 250: Performed by: STUDENT IN AN ORGANIZED HEALTH CARE EDUCATION/TRAINING PROGRAM

## 2022-01-12 PROCEDURE — 97161 PT EVAL LOW COMPLEX 20 MIN: CPT | Mod: GP

## 2022-01-12 PROCEDURE — 250N000013 HC RX MED GY IP 250 OP 250 PS 637

## 2022-01-12 PROCEDURE — 85027 COMPLETE CBC AUTOMATED: CPT | Performed by: STUDENT IN AN ORGANIZED HEALTH CARE EDUCATION/TRAINING PROGRAM

## 2022-01-12 PROCEDURE — 99291 CRITICAL CARE FIRST HOUR: CPT | Mod: GC | Performed by: INTERNAL MEDICINE

## 2022-01-12 PROCEDURE — 250N000013 HC RX MED GY IP 250 OP 250 PS 637: Performed by: STUDENT IN AN ORGANIZED HEALTH CARE EDUCATION/TRAINING PROGRAM

## 2022-01-12 PROCEDURE — 97530 THERAPEUTIC ACTIVITIES: CPT | Mod: GP

## 2022-01-12 PROCEDURE — 94640 AIRWAY INHALATION TREATMENT: CPT

## 2022-01-12 PROCEDURE — 82330 ASSAY OF CALCIUM: CPT | Performed by: STUDENT IN AN ORGANIZED HEALTH CARE EDUCATION/TRAINING PROGRAM

## 2022-01-12 PROCEDURE — 82040 ASSAY OF SERUM ALBUMIN: CPT | Performed by: STUDENT IN AN ORGANIZED HEALTH CARE EDUCATION/TRAINING PROGRAM

## 2022-01-12 PROCEDURE — 999N000157 HC STATISTIC RCP TIME EA 10 MIN

## 2022-01-12 PROCEDURE — 94003 VENT MGMT INPAT SUBQ DAY: CPT

## 2022-01-12 PROCEDURE — 250N000013 HC RX MED GY IP 250 OP 250 PS 637: Performed by: SURGERY

## 2022-01-12 PROCEDURE — 80053 COMPREHEN METABOLIC PANEL: CPT | Performed by: STUDENT IN AN ORGANIZED HEALTH CARE EDUCATION/TRAINING PROGRAM

## 2022-01-12 PROCEDURE — 84100 ASSAY OF PHOSPHORUS: CPT | Performed by: STUDENT IN AN ORGANIZED HEALTH CARE EDUCATION/TRAINING PROGRAM

## 2022-01-12 PROCEDURE — 94640 AIRWAY INHALATION TREATMENT: CPT | Mod: 76

## 2022-01-12 PROCEDURE — 258N000003 HC RX IP 258 OP 636: Performed by: STUDENT IN AN ORGANIZED HEALTH CARE EDUCATION/TRAINING PROGRAM

## 2022-01-12 PROCEDURE — 84145 PROCALCITONIN (PCT): CPT | Performed by: STUDENT IN AN ORGANIZED HEALTH CARE EDUCATION/TRAINING PROGRAM

## 2022-01-12 PROCEDURE — 250N000011 HC RX IP 250 OP 636

## 2022-01-12 PROCEDURE — 250N000009 HC RX 250: Performed by: SURGERY

## 2022-01-12 PROCEDURE — 83735 ASSAY OF MAGNESIUM: CPT | Performed by: STUDENT IN AN ORGANIZED HEALTH CARE EDUCATION/TRAINING PROGRAM

## 2022-01-12 RX ORDER — MAGNESIUM SULFATE HEPTAHYDRATE 40 MG/ML
4 INJECTION, SOLUTION INTRAVENOUS ONCE
Status: COMPLETED | OUTPATIENT
Start: 2022-01-12 | End: 2022-01-12

## 2022-01-12 RX ORDER — POTASSIUM CHLORIDE 20MEQ/15ML
40 LIQUID (ML) ORAL ONCE
Status: COMPLETED | OUTPATIENT
Start: 2022-01-12 | End: 2022-01-12

## 2022-01-12 RX ADMIN — PROPOFOL 15 MCG/KG/MIN: 10 INJECTION, EMULSION INTRAVENOUS at 00:49

## 2022-01-12 RX ADMIN — POTASSIUM CHLORIDE 40 MEQ: 40 SOLUTION ORAL at 05:50

## 2022-01-12 RX ADMIN — ALBUTEROL SULFATE 2.5 MG: 2.5 SOLUTION RESPIRATORY (INHALATION) at 14:02

## 2022-01-12 RX ADMIN — FOLIC ACID 1 MG: 1 TABLET ORAL at 08:00

## 2022-01-12 RX ADMIN — DEXMEDETOMIDINE HYDROCHLORIDE 0.9 MCG/KG/HR: 400 INJECTION INTRAVENOUS at 18:38

## 2022-01-12 RX ADMIN — ENOXAPARIN SODIUM 40 MG: 40 INJECTION SUBCUTANEOUS at 20:01

## 2022-01-12 RX ADMIN — Medication 40 MG: at 08:00

## 2022-01-12 RX ADMIN — QUETIAPINE FUMARATE 50 MG: 50 TABLET ORAL at 20:01

## 2022-01-12 RX ADMIN — ALBUTEROL SULFATE 2.5 MG: 2.5 SOLUTION RESPIRATORY (INHALATION) at 08:32

## 2022-01-12 RX ADMIN — FENTANYL CITRATE 100 MCG/HR: 50 INJECTION INTRAVENOUS at 07:46

## 2022-01-12 RX ADMIN — CEFTRIAXONE SODIUM 2 G: 2 INJECTION, POWDER, FOR SOLUTION INTRAMUSCULAR; INTRAVENOUS at 09:42

## 2022-01-12 RX ADMIN — DEXMEDETOMIDINE HYDROCHLORIDE 0.9 MCG/KG/HR: 400 INJECTION INTRAVENOUS at 12:55

## 2022-01-12 RX ADMIN — INSULIN HUMAN 4 UNITS: 100 INJECTION, SUSPENSION SUBCUTANEOUS at 08:05

## 2022-01-12 RX ADMIN — ALBUTEROL SULFATE 2.5 MG: 2.5 SOLUTION RESPIRATORY (INHALATION) at 02:14

## 2022-01-12 RX ADMIN — THIAMINE HYDROCHLORIDE 250 MG: 100 INJECTION, SOLUTION INTRAMUSCULAR; INTRAVENOUS at 07:53

## 2022-01-12 RX ADMIN — ACETYLCYSTEINE 4 ML: 100 SOLUTION ORAL; RESPIRATORY (INHALATION) at 20:39

## 2022-01-12 RX ADMIN — Medication 1 PACKET: at 08:00

## 2022-01-12 RX ADMIN — REMDESIVIR 100 MG: 100 INJECTION, POWDER, LYOPHILIZED, FOR SOLUTION INTRAVENOUS at 21:40

## 2022-01-12 RX ADMIN — DEXMEDETOMIDINE HYDROCHLORIDE 0.9 MCG/KG/HR: 400 INJECTION INTRAVENOUS at 05:51

## 2022-01-12 RX ADMIN — ACETYLCYSTEINE 4 ML: 100 SOLUTION ORAL; RESPIRATORY (INHALATION) at 14:02

## 2022-01-12 RX ADMIN — STANDARDIZED SENNA CONCENTRATE 8.6 MG: 8.6 TABLET ORAL at 20:01

## 2022-01-12 RX ADMIN — DEXMEDETOMIDINE HYDROCHLORIDE 0.9 MCG/KG/HR: 400 INJECTION INTRAVENOUS at 01:59

## 2022-01-12 RX ADMIN — Medication 1 PACKET: at 20:02

## 2022-01-12 RX ADMIN — PROPOFOL 20 MCG/KG/MIN: 10 INJECTION, EMULSION INTRAVENOUS at 08:06

## 2022-01-12 RX ADMIN — QUETIAPINE FUMARATE 50 MG: 50 TABLET ORAL at 08:00

## 2022-01-12 RX ADMIN — THERA TABS 1 TABLET: TAB at 08:00

## 2022-01-12 RX ADMIN — SODIUM CHLORIDE 50 ML: 9 INJECTION, SOLUTION INTRAVENOUS at 23:22

## 2022-01-12 RX ADMIN — Medication 1 PACKET: at 13:54

## 2022-01-12 RX ADMIN — ALBUTEROL SULFATE 2.5 MG: 2.5 SOLUTION RESPIRATORY (INHALATION) at 20:39

## 2022-01-12 RX ADMIN — MAGNESIUM SULFATE IN WATER 4 G: 40 INJECTION, SOLUTION INTRAVENOUS at 05:50

## 2022-01-12 RX ADMIN — DEXAMETHASONE SODIUM PHOSPHATE 6 MG: 4 INJECTION, SOLUTION INTRA-ARTICULAR; INTRALESIONAL; INTRAMUSCULAR; INTRAVENOUS; SOFT TISSUE at 11:35

## 2022-01-12 RX ADMIN — ACETYLCYSTEINE 4 ML: 100 SOLUTION ORAL; RESPIRATORY (INHALATION) at 08:32

## 2022-01-12 RX ADMIN — DEXMEDETOMIDINE HYDROCHLORIDE 0.9 MCG/KG/HR: 400 INJECTION INTRAVENOUS at 23:23

## 2022-01-12 RX ADMIN — ENOXAPARIN SODIUM 40 MG: 40 INJECTION SUBCUTANEOUS at 08:04

## 2022-01-12 ASSESSMENT — ACTIVITIES OF DAILY LIVING (ADL)
ADLS_ACUITY_SCORE: 16
ADLS_ACUITY_SCORE: 18
ADLS_ACUITY_SCORE: 18
ADLS_ACUITY_SCORE: 19
ADLS_ACUITY_SCORE: 18
ADLS_ACUITY_SCORE: 16
ADLS_ACUITY_SCORE: 18
ADLS_ACUITY_SCORE: 18
ADLS_ACUITY_SCORE: 16
ADLS_ACUITY_SCORE: 16
ADLS_ACUITY_SCORE: 18
ADLS_ACUITY_SCORE: 18
ADLS_ACUITY_SCORE: 16
ADLS_ACUITY_SCORE: 18
ADLS_ACUITY_SCORE: 19
ADLS_ACUITY_SCORE: 18
ADLS_ACUITY_SCORE: 18
ADLS_ACUITY_SCORE: 19
ADLS_ACUITY_SCORE: 16
ADLS_ACUITY_SCORE: 18
ADLS_ACUITY_SCORE: 18

## 2022-01-12 ASSESSMENT — MIFFLIN-ST. JEOR: SCORE: 1815.25

## 2022-01-12 NOTE — PROGRESS NOTES
MEDICAL ICU PROGRESS NOTE  01/12/2022    Date of Service (when I saw the patient): 01/12/2022    ASSESSMENT: Brent Lau is a 41 year old male with PMH of tobacco use, alcohol use, and GERD who was admitted to the MICU as a transfer from OSH on 1/6/22 due to shock and acute hypoxic respiratory failure found to have strep pneumo PNA and MSSA bacteremia.    CHANGES and MAJOR THINGS TODAY:   - x1 Fever yesterday has come down, follow cultures   - increase NPH considering elevated BG  - continue pressure support trials     PLAN:    Neuro:  # Pain and Sedation  RAAS -1 to -2.   - continue to wean propofol.  - continue Precedex.  - Seroquel 50 mg BID scheduled.  - Fentanyl     # Alcohol use disorder  Per wife, patient has been drinking up to 1 L of vodka/day since Jan 28, 2021 after the sudden death of his sister. BAL elevated on OSH ED arrival. No charted or reported history of alcohol withdrawal/seizures; wife thinks there is a significant component of depression and would like [facility] +/- psych meds at the time of discharge. Given Phenobarbitol load 1/9/22.  - Folic acid and Multivitamin Daily  - Thiamine per Wernicke protocol (250 mg daily x5 days, then 100 mg daily thereafter)  - Chemical Dependency consult when extubated      Pulmonary:  # Acute Hypoxic Respiratory Failure  # Strep Pneumo PNA  # MSSA bacteremia  # COVID infection  Patient with dense consolidative infiltrates in the lower lobes on CT c/f significant pneumonia found to have strep pneumo on blood and sputum culture as well as antigen. Extubated 1/8 and re intubated, however no documentation was made of indications. Ongoing thick secretions. CXR 1/11 improved .Procalcitonin today decreased from prior.   - Albuterol 2.5mg q4h nebulizer  - Mucomyst q4h  - Antibiotics as below, currently Ceftriaxone 2g q24h    Ventilation Mode: CMV/AC  (Continuous Mandatory Ventilation/ Assist  Control)  FiO2 (%): 40 %  Rate Set (breaths/minute): 22 breaths/min  Tidal Volume Set (mL): 450 mL  PEEP (cm H2O): 7 cmH2O  Oxygen Concentration (%): 45 %  Resp: 24     Cardiovascular:  # Septic shock, resolved  Likely in the setting of hypovolemia v distributive/vasoplegic v other. Got 2.25 L at OSH and 2L within first few hours of transfer.   - MAP goal >65   - ECHO with EF of 30-35% and moderate diffuse hypokinesis    # Hypertension  - Hydralazine 10mg prn for BP >180/>110     GI/Nutrition:  # Nutrition  - at goal TF @ 60mL/hr     # GERD  - PPI for stress ulcer prophylaxis      # Bowel Regimen   - Senna scheduled  - Miralax PRN      Renal/Electrolytes:  # Metabolic Acidosis, resolved  # Alcoholic Ketosis, resolved  # Lactic Acidosis, resolved  Adequate respiratory compensation. Lactate 10.5 on ED arrival, improved after some fluid resuscitation. Ketones elevated, likely in the setting of acute alcohol intoxication. CK normal.     # Volume management  Goal net negative 0.5-1L daily.  - 40 mg IVP furosemide today. Reassess in afternoon.    # Hypokalemia  # Hypomagnesemia  # Hypocalcemia   - daily labs; replete PRN     Infectious Disease:  # Sepsis   # Strep Pneumo PNA  # MSSA bacteremia  Patient had positive strep pneumo on BCx and Sputum Cx with MSSA on sputum cx. Legionella, Covid, influenza, Histo, Blasto RSV, and MRSA negative.      # New-onset Fever  Tmax 102.8 on 1/11 a.m. Already on ceftriaxone for MSSA bacteremia. Respiratory status has not worsened. Abd slightly distended, but stable from previous exams.  - CXR and AXR.  - UA, BCx x2 and sputum Cx.  - add-on diff to assess for eosinophils.  - Will hold on broadening abx unless fever persists or findings suggestive of untreated infection.    Antimicrobials:  Ceftriaxone 1/6 - present (Day 4/14)  Zosyn 1/5- D'karena same day  Vancomycin 1/5- D'karena same day  Azithromycin 1/5- D'karena same day    1/5/22 2/2 bcx + Strep pneumo  1/6/22 sputum cx + Strep pneumo, Staph  aureus  1/6/22 bcx + Staph aureus  1/7/22 bcx NGTD  1/8/22 bcx NGTD  1/9/22 bcx NGTD  1/9/22 ucx NGTD  1/9/22 sputum cx NGTD     # COVID-19  S/p 1 Pfizer vaccine 11/17/21. COVID infection approx 11/25/21. Negative tests 1/5 and 1/6; found to be positive 1/9/22 in the setting of clinically worsening.  - Dexamethasone 6mg for 10-day course (1/9/22- )  - 5 day Remdesevir course (1/9/22- )  - Lovenox 40mg BID    Hematology:  # Acute Thrombocytopenia   # Acute Normocytic Anemia, stable  Likely in the setting of some likely alcoholic liver disease and some suppression in the setting of acute infection. No acute signs of bleeding on admission or during stay. HgB today 9.9.  - Lovenox 40mg BID  - daily CBC  - Hb goal > 7      Endocrine:  No history of DM. Increasing BG after initiating Dexamethasone 1/9. BG's in 200-300 rabge, Started low dose sliding scale insulin on 1/10.  - Increase NPH to 15 units daily around time of Decadron administration.  - Increase sliding scale to high resistance dosing.      MSK:  No active issues  - PT/OT consulted     Skin:  No active issues    General Cares/Prophylaxis:    DVT Prophylaxis: Lovenox 40mg BID; COVID dosing  GI Prophylaxis: PPI  Restraints: chemical, soft 4 point  Family Communication: updated wife, Zeynep, on the phone 1/11/22.  Code Status: Full Code    Lines/tubes/drains:  - PICC 1/6  - ETT 1/8  - Villatoro 1/9  - OG 1/8    Disposition:  - Medical ICU    Patient seen and findings/plan discussed with medical ICU staff, Dr. Lalita Lombardi MD  ENT PGY2    Attending note:  Patient seen, examined and discussed with the Resident physicians. All data reviewed including vital signs, medications, laboratory studies and imaging.  I agree with the assessment and plan as outlined in the above note.  The patient remains critically ill with acute respiratory failure, alcohol abuse syndrome, encephalopathy, and alcoholic liver disease.  I personally adjusted the ventilator  to treat the acute respiratory failure, managed the vasopressors for shock, and managed medications for his encephalopathy.  Decreasing ventilator support- tolerating some wean trials.  Fever curve is improved.  Completed Remdesivir and decadron.  Will wean sedation.     Total Critical Care time excluding time for teaching and procedures was 40 minutes.     Elenita Celis MD  [phone]  ========================  INTERVAL HISTORY:   Reviewed overnight nursing notes. No acute events. Tm overnight 100.8 after elevated temp yesterday.     OBJECTIVE:   1. VITAL SIGNS:   Temp:  [97.7  F (36.5  C)-100.8  F (38.2  C)] 99.6  F (37.6  C)  Pulse:  [] 97  Resp:  [14-37] 24  BP: ()/() 92/60  FiO2 (%):  [40 %-50 %] 40 %  SpO2:  [91 %-100 %] 96 %  Ventilation Mode: CMV/AC  (Continuous Mandatory Ventilation/ Assist Control)  FiO2 (%): 40 %  Rate Set (breaths/minute): 22 breaths/min  Tidal Volume Set (mL): 450 mL  PEEP (cm H2O): 7 cmH2O  Oxygen Concentration (%): 45 %  Resp: 24    2. INTAKE/ OUTPUT:   I/O last 3 completed shifts:  In: 2807.82 [I.V.:1097.82; NG/GT:390]  Out: 3435 [Urine:3435]    3. PHYSICAL EXAMINATION:  General: supine, comfortably laying in bed  Neuro: sedated, not following commands  Pulm/Resp: mechanical ventilation. Placed on pressure support in room with slight increase in R  CV: RRR  Abdomen: Soft, slight-distention, does not appear tender  :  newberry catheter in place, urine yellow and clear  Incisions/Skin: no rash or lesions visible  Lymph: no LE edema    4. LABS:   Arterial Blood Gases   Recent Labs   Lab 01/09/22  1740 01/06/22  1611 01/06/22  0234 01/05/22  2113   PH 7.50* 7.57* 7.40 7.28*   PCO2 34* 26* 31* 33*   PO2 84 91 111* 89   HCO3 26 24 19* 16*     Complete Blood Count   Recent Labs   Lab 01/12/22  0354 01/11/22  1024 01/11/22  0338 01/10/22  0511   WBC 17.0* 15.0* 12.3*  12.3* 7.3   HGB 9.8* 9.9* 9.6* 9.9*    172 158 111*     Basic Metabolic Panel  Recent Labs   Lab  01/12/22  1131 01/12/22  0749 01/12/22  0353 01/12/22  0333 01/11/22  0835 01/11/22  0832 01/11/22  0346 01/11/22  0338 01/10/22  2023 01/10/22  1834 01/10/22  0349 01/10/22  0344   NA  --   --  138  --   --   --   --  139  --  133  --  128*   POTASSIUM  --   --  3.3*  --   --  4.3  --  3.4  --  3.8  --  3.6   CHLORIDE  --   --  104  --   --   --   --  107  --  101  --  99   CO2  --   --  28  --   --   --   --  26  --  24  --  23   BUN  --   --  16  --   --   --   --  15  --  15  --  13   CR  --   --  0.60*  --   --   --   --  0.56*  --  0.62*  --  0.56*   * 229* 271* 234*   < >  --    < > 268*   < > 330*   < > 212*    < > = values in this interval not displayed.     Liver Function Tests  Recent Labs   Lab 01/12/22  0353 01/11/22  0338 01/10/22  0344 01/09/22  0349 01/05/22  1730 01/05/22  1357   AST 50* 100* 97* 142*   < >  --    ALT 61 73* 42 47   < >  --    ALKPHOS 114 116 107 138   < >  --    BILITOTAL 0.3 0.3 0.6 0.7   < >  --    ALBUMIN 1.4* 1.2* 1.2* 1.4*   < >  --    INR  --   --   --   --   --  1.08    < > = values in this interval not displayed.     Coagulation Profile  Recent Labs   Lab 01/05/22  1357   INR 1.08   PTT 37       5. RADIOLOGY:   No results found for this or any previous visit (from the past 24 hour(s)).

## 2022-01-12 NOTE — PROGRESS NOTES
01/12/22 1600   Quick Adds   Type of Visit Initial PT Evaluation   Living Environment   People in home spouse;child(shelly), dependent   Current Living Arrangements house   Home Accessibility stairs within home   Living Environment Comments Pt intubated but awake, alert and appears appropriate. 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 WIll need to clarify some details once pt extubated such as number of stairs in home, etc, but  pt does not yes to I with mobility, ADLs prior to admission   Self-Care   Usual Activity Tolerance good   Current Activity Tolerance poor   Equipment Currently Used at Home none   Activity/Exercise/Self-Care Comment Pt nods yes to I with ADLs, stands to shower   Disability/Function   Hearing Difficulty or Deaf no   Concentrating, Remembering or Making Decisions Difficulty no   Difficulty Eating/Swallowing no   Walking or Climbing Stairs Difficulty no   Dressing/Bathing Difficulty no   Toileting issues no   Change in Functional Status Since Onset of Current Illness/Injury yes   General Information   Onset of Illness/Injury or Date of Surgery 01/06/22   Referring Physician Tim Grant MD   Patient/Family Therapy Goals Statement (PT) unable to state   Pertinent History of Current Problem (include personal factors and/or comorbidities that impact the POC) 41 year old male with PMH of tobacco use, alcohol use, and GERD who was admitted to the MICU as a transfer from OSH on 1/6/22 due to shock and acute hypoxic respiratory failure found to have strep pneumo PNA and MSSA bacteremia.   General Observations Pt very agreeable to working with PT, appreciated being able to move around   Cognition   Orientation Status (Cognition) oriented to;person;place;situation   Affect/Mental Status (Cognition) WFL   Follows Commands (Cognition) follows one-step commands;75-90% accuracy;increased processing time needed;physical/tactile prompts required;verbal  cues/prompting required   Posture    Posture Protracted shoulders   Range of Motion (ROM)   ROM Comment B LEs WFL   Strength   Strength Comments B LEs mildly decreased due to increased time in bed   Bed Mobility   Comment (Bed Mobility) Heavy mod A sit<>supine, sitting EOB min A   Transfers   Transfer Safety Comments Not appropriate to trial yet with some increased difficulty breathing sitting up unsupported   Balance   Balance Comments Mod A initially sitting balance, min A sitting balance with some self UE support as remained up   Clinical Impression   Criteria for Skilled Therapeutic Intervention yes, treatment indicated   PT Diagnosis (PT) impaired functional mobility   Influenced by the following impairments decreased strength, act tolerance, balance, increased work of breathing/intubated   Functional limitations due to impairments decreased I with transfers, bed mob, gait, ADLs, barrier navigation   Clinical Presentation Evolving/Changing   Clinical Presentation Rationale clinical judgement   Clinical Decision Making (Complexity) low complexity   Therapy Frequency (PT) 5x/week   Predicted Duration of Therapy Intervention (days/wks) 3 weeks   Planned Therapy Interventions (PT) balance training;bed mobility training;gait training;home exercise program;patient/family education;ROM (range of motion);stair training;strengthening;transfer training;progressive activity/exercise;risk factor education;home program guidelines   Risk & Benefits of therapy have been explained evaluation/treatment results reviewed;care plan/treatment goals reviewed;risks/benefits reviewed;current/potential barriers reviewed;participants voiced agreement with care plan;participants included;patient   PT Discharge Planning    PT Discharge Recommendation (DC Rec) Transitional Care Facility;home with assist;home with home care physical therapy   PT Rationale for DC Rec PT: Pt intubated so not appropriate for home at this time. Pt currently may  need rehab stay due to poor activity tolerance still for functional mobility, but based on mobility today while intubated, anticipate pt could progress to discharge home with A from family. Will be able to better assess functional mobility/ADL progress once pt extubated.    PT Brief overview of current status  Mod A transfer to sitting, intubated currently   Total Evaluation Time   Total Evaluation Time (Minutes) 8

## 2022-01-12 NOTE — PLAN OF CARE
ICU End of Shift Summary. See flowsheets for vital signs and detailed assessment.    Changes this shift:   Patient has done well over night.  Sats did drop some near beginning of shift.  FiO2 increased to 50%.   Later in the shift it was turned back down.  Patient has been able to maintain sats.  Patient became anxious about 0200.  He just wanted to 'get outside'. Will seemed to a little confused.  Will be to monitor.  Plan:  Will continue to monitor.

## 2022-01-12 NOTE — PLAN OF CARE
ICU End of Shift Summary. See flowsheets for vital signs and detailed assessment.    Changes this shift:     Neuro: Alert and appears oriented, RASS 0 to -1, nods yes/no to questions and able to make needs known with gestures, following commands. Purposeful movements in all extremities, 5/5 strengths. Denied pain. Off propofol since 1130. Continue on fentanyl and precedex to maintain RASS goal.  Restraints and mitts removed currently.  Cardiac: SR, HR 80s-90s, up to 110s with agitation and cares. BP WNL.  Resp: CMV settings: 22/450/7/45%, O2 sat >92%. Switched to PST per MICU I this morning, patient failed. Will attempt again this evening, RT notified.  GI/: TF at goal, 60 mL/hr with standard flushes. BG remains in the 200. NPH increased to 11 units daily. Novolog given per sliding scale (see MAR).  Continent BMs x2, small-large, loose/form, brown. Villatoro patent and intact.    Plan: Continue to PST and wean sedation. Monitor for changes and follow POC.

## 2022-01-12 NOTE — PLAN OF CARE
[id number]:    ICU End of Shift Summary. See flowsheets for vital signs and detailed assessment.    Changes this shift: IV Lasix 40 mg given with good urine output. Large, loose incontinent BM. BG was 205.     Plan: Pressure support tomorrow. Continue with POC and notify team of any changes or concerns.

## 2022-01-13 ENCOUNTER — APPOINTMENT (OUTPATIENT)
Dept: PHYSICAL THERAPY | Facility: CLINIC | Age: 42
DRG: 870 | End: 2022-01-13
Attending: SURGERY
Payer: COMMERCIAL

## 2022-01-13 LAB
ALBUMIN SERPL-MCNC: 1.4 G/DL (ref 3.4–5)
ALP SERPL-CCNC: 106 U/L (ref 40–150)
ALT SERPL W P-5'-P-CCNC: 57 U/L (ref 0–70)
ANION GAP SERPL CALCULATED.3IONS-SCNC: 5 MMOL/L (ref 3–14)
AST SERPL W P-5'-P-CCNC: 48 U/L (ref 0–45)
BACTERIA BLD CULT: NO GROWTH
BACTERIA BLD CULT: NO GROWTH
BACTERIA SPT CULT: ABNORMAL
BASE EXCESS BLDV CALC-SCNC: 5.6 MMOL/L (ref -7.7–1.9)
BILIRUB SERPL-MCNC: 0.2 MG/DL (ref 0.2–1.3)
BUN SERPL-MCNC: 18 MG/DL (ref 7–30)
CA-I BLD-MCNC: 4.4 MG/DL (ref 4.4–5.2)
CALCIUM SERPL-MCNC: 8.1 MG/DL (ref 8.5–10.1)
CHLORIDE BLD-SCNC: 105 MMOL/L (ref 94–109)
CO2 SERPL-SCNC: 28 MMOL/L (ref 20–32)
CREAT SERPL-MCNC: 0.61 MG/DL (ref 0.66–1.25)
ERYTHROCYTE [DISTWIDTH] IN BLOOD BY AUTOMATED COUNT: 13.5 % (ref 10–15)
GFR SERPL CREATININE-BSD FRML MDRD: >90 ML/MIN/1.73M2
GLUCOSE BLD-MCNC: 157 MG/DL (ref 70–99)
GLUCOSE BLDC GLUCOMTR-MCNC: 146 MG/DL (ref 70–99)
GLUCOSE BLDC GLUCOMTR-MCNC: 148 MG/DL (ref 70–99)
GLUCOSE BLDC GLUCOMTR-MCNC: 161 MG/DL (ref 70–99)
GLUCOSE BLDC GLUCOMTR-MCNC: 170 MG/DL (ref 70–99)
GLUCOSE BLDC GLUCOMTR-MCNC: 184 MG/DL (ref 70–99)
GLUCOSE BLDC GLUCOMTR-MCNC: 232 MG/DL (ref 70–99)
GRAM STAIN RESULT: ABNORMAL
GRAM STAIN RESULT: ABNORMAL
HCO3 BLDV-SCNC: 30 MMOL/L (ref 21–28)
HCT VFR BLD AUTO: 28 % (ref 40–53)
HGB BLD-MCNC: 9.2 G/DL (ref 13.3–17.7)
MAGNESIUM SERPL-MCNC: 2.2 MG/DL (ref 1.6–2.3)
MCH RBC QN AUTO: 32.3 PG (ref 26.5–33)
MCHC RBC AUTO-ENTMCNC: 32.9 G/DL (ref 31.5–36.5)
MCV RBC AUTO: 98 FL (ref 78–100)
O2/TOTAL GAS SETTING VFR VENT: 40 %
PCO2 BLDV: 44 MM HG (ref 40–50)
PH BLDV: 7.45 [PH] (ref 7.32–7.43)
PHOSPHATE SERPL-MCNC: 3.1 MG/DL (ref 2.5–4.5)
PLATELET # BLD AUTO: 303 10E3/UL (ref 150–450)
PO2 BLDV: 42 MM HG (ref 25–47)
POTASSIUM BLD-SCNC: 4 MMOL/L (ref 3.4–5.3)
PROT SERPL-MCNC: 6.8 G/DL (ref 6.8–8.8)
RBC # BLD AUTO: 2.85 10E6/UL (ref 4.4–5.9)
SODIUM SERPL-SCNC: 138 MMOL/L (ref 133–144)
WBC # BLD AUTO: 16.5 10E3/UL (ref 4–11)

## 2022-01-13 PROCEDURE — 250N000013 HC RX MED GY IP 250 OP 250 PS 637: Performed by: STUDENT IN AN ORGANIZED HEALTH CARE EDUCATION/TRAINING PROGRAM

## 2022-01-13 PROCEDURE — 250N000011 HC RX IP 250 OP 636: Performed by: STUDENT IN AN ORGANIZED HEALTH CARE EDUCATION/TRAINING PROGRAM

## 2022-01-13 PROCEDURE — 250N000013 HC RX MED GY IP 250 OP 250 PS 637: Performed by: SURGERY

## 2022-01-13 PROCEDURE — 82330 ASSAY OF CALCIUM: CPT | Performed by: STUDENT IN AN ORGANIZED HEALTH CARE EDUCATION/TRAINING PROGRAM

## 2022-01-13 PROCEDURE — 258N000003 HC RX IP 258 OP 636: Performed by: STUDENT IN AN ORGANIZED HEALTH CARE EDUCATION/TRAINING PROGRAM

## 2022-01-13 PROCEDURE — 999N000157 HC STATISTIC RCP TIME EA 10 MIN

## 2022-01-13 PROCEDURE — 99291 CRITICAL CARE FIRST HOUR: CPT | Mod: GC | Performed by: INTERNAL MEDICINE

## 2022-01-13 PROCEDURE — 94640 AIRWAY INHALATION TREATMENT: CPT | Mod: 76

## 2022-01-13 PROCEDURE — 250N000013 HC RX MED GY IP 250 OP 250 PS 637

## 2022-01-13 PROCEDURE — 200N000002 HC R&B ICU UMMC

## 2022-01-13 PROCEDURE — 94003 VENT MGMT INPAT SUBQ DAY: CPT

## 2022-01-13 PROCEDURE — 250N000009 HC RX 250: Performed by: STUDENT IN AN ORGANIZED HEALTH CARE EDUCATION/TRAINING PROGRAM

## 2022-01-13 PROCEDURE — 85014 HEMATOCRIT: CPT | Performed by: STUDENT IN AN ORGANIZED HEALTH CARE EDUCATION/TRAINING PROGRAM

## 2022-01-13 PROCEDURE — 83735 ASSAY OF MAGNESIUM: CPT | Performed by: STUDENT IN AN ORGANIZED HEALTH CARE EDUCATION/TRAINING PROGRAM

## 2022-01-13 PROCEDURE — 80053 COMPREHEN METABOLIC PANEL: CPT | Performed by: STUDENT IN AN ORGANIZED HEALTH CARE EDUCATION/TRAINING PROGRAM

## 2022-01-13 PROCEDURE — 250N000009 HC RX 250

## 2022-01-13 PROCEDURE — 84100 ASSAY OF PHOSPHORUS: CPT | Performed by: STUDENT IN AN ORGANIZED HEALTH CARE EDUCATION/TRAINING PROGRAM

## 2022-01-13 PROCEDURE — 94640 AIRWAY INHALATION TREATMENT: CPT

## 2022-01-13 PROCEDURE — 97530 THERAPEUTIC ACTIVITIES: CPT | Mod: GP

## 2022-01-13 PROCEDURE — 250N000011 HC RX IP 250 OP 636

## 2022-01-13 PROCEDURE — 250N000009 HC RX 250: Performed by: SURGERY

## 2022-01-13 PROCEDURE — 82803 BLOOD GASES ANY COMBINATION: CPT | Performed by: STUDENT IN AN ORGANIZED HEALTH CARE EDUCATION/TRAINING PROGRAM

## 2022-01-13 RX ORDER — QUETIAPINE FUMARATE 50 MG/1
50 TABLET, FILM COATED ORAL AT BEDTIME
Status: DISCONTINUED | OUTPATIENT
Start: 2022-01-13 | End: 2022-01-15

## 2022-01-13 RX ORDER — ACETYLCYSTEINE 100 MG/ML
2 SOLUTION ORAL; RESPIRATORY (INHALATION) 3 TIMES DAILY
Status: DISCONTINUED | OUTPATIENT
Start: 2022-01-13 | End: 2022-01-15

## 2022-01-13 RX ORDER — FUROSEMIDE 10 MG/ML
40 INJECTION INTRAMUSCULAR; INTRAVENOUS ONCE
Status: COMPLETED | OUTPATIENT
Start: 2022-01-13 | End: 2022-01-13

## 2022-01-13 RX ADMIN — ACETYLCYSTEINE 2 ML: 100 SOLUTION ORAL; RESPIRATORY (INHALATION) at 20:32

## 2022-01-13 RX ADMIN — QUETIAPINE FUMARATE 50 MG: 50 TABLET ORAL at 07:46

## 2022-01-13 RX ADMIN — Medication 1 PACKET: at 15:18

## 2022-01-13 RX ADMIN — THERA TABS 1 TABLET: TAB at 07:46

## 2022-01-13 RX ADMIN — Medication 50 MCG: at 16:21

## 2022-01-13 RX ADMIN — ALBUTEROL SULFATE 2.5 MG: 2.5 SOLUTION RESPIRATORY (INHALATION) at 03:38

## 2022-01-13 RX ADMIN — ALBUTEROL SULFATE 2.5 MG: 2.5 SOLUTION RESPIRATORY (INHALATION) at 14:18

## 2022-01-13 RX ADMIN — Medication 40 MG: at 10:03

## 2022-01-13 RX ADMIN — Medication 1 PACKET: at 19:53

## 2022-01-13 RX ADMIN — FUROSEMIDE 40 MG: 10 INJECTION, SOLUTION INTRAVENOUS at 11:33

## 2022-01-13 RX ADMIN — DEXMEDETOMIDINE HYDROCHLORIDE 0.5 MCG/KG/HR: 400 INJECTION INTRAVENOUS at 07:12

## 2022-01-13 RX ADMIN — DEXMEDETOMIDINE HYDROCHLORIDE 1 MCG/KG/HR: 400 INJECTION INTRAVENOUS at 16:20

## 2022-01-13 RX ADMIN — Medication 50 MCG: at 22:07

## 2022-01-13 RX ADMIN — QUETIAPINE FUMARATE 50 MG: 50 TABLET ORAL at 21:47

## 2022-01-13 RX ADMIN — ALBUTEROL SULFATE 2.5 MG: 2.5 SOLUTION RESPIRATORY (INHALATION) at 20:32

## 2022-01-13 RX ADMIN — CEFTRIAXONE SODIUM 2 G: 2 INJECTION, POWDER, FOR SOLUTION INTRAMUSCULAR; INTRAVENOUS at 10:03

## 2022-01-13 RX ADMIN — DEXAMETHASONE SODIUM PHOSPHATE 6 MG: 4 INJECTION, SOLUTION INTRA-ARTICULAR; INTRALESIONAL; INTRAMUSCULAR; INTRAVENOUS; SOFT TISSUE at 11:31

## 2022-01-13 RX ADMIN — FENTANYL CITRATE 50 MCG/HR: 50 INJECTION INTRAVENOUS at 16:19

## 2022-01-13 RX ADMIN — ENOXAPARIN SODIUM 40 MG: 40 INJECTION SUBCUTANEOUS at 07:46

## 2022-01-13 RX ADMIN — ALBUTEROL SULFATE 2.5 MG: 2.5 SOLUTION RESPIRATORY (INHALATION) at 09:21

## 2022-01-13 RX ADMIN — FOLIC ACID 1 MG: 1 TABLET ORAL at 07:46

## 2022-01-13 RX ADMIN — Medication 1 PACKET: at 07:46

## 2022-01-13 RX ADMIN — ENOXAPARIN SODIUM 40 MG: 40 INJECTION SUBCUTANEOUS at 19:51

## 2022-01-13 RX ADMIN — ACETYLCYSTEINE 2 ML: 100 SOLUTION ORAL; RESPIRATORY (INHALATION) at 14:18

## 2022-01-13 RX ADMIN — THIAMINE HCL TAB 100 MG 100 MG: 100 TAB at 07:46

## 2022-01-13 RX ADMIN — REMDESIVIR 100 MG: 100 INJECTION, POWDER, LYOPHILIZED, FOR SOLUTION INTRAVENOUS at 19:53

## 2022-01-13 RX ADMIN — DEXMEDETOMIDINE HYDROCHLORIDE 0.5 MCG/KG/HR: 400 INJECTION INTRAVENOUS at 23:29

## 2022-01-13 RX ADMIN — SODIUM CHLORIDE 50 ML: 9 INJECTION, SOLUTION INTRAVENOUS at 21:00

## 2022-01-13 ASSESSMENT — ACTIVITIES OF DAILY LIVING (ADL)
ADLS_ACUITY_SCORE: 16
ADLS_ACUITY_SCORE: 18
ADLS_ACUITY_SCORE: 18
ADLS_ACUITY_SCORE: 16
ADLS_ACUITY_SCORE: 18
ADLS_ACUITY_SCORE: 16
ADLS_ACUITY_SCORE: 16
ADLS_ACUITY_SCORE: 18
ADLS_ACUITY_SCORE: 16
ADLS_ACUITY_SCORE: 18
ADLS_ACUITY_SCORE: 16
ADLS_ACUITY_SCORE: 18
ADLS_ACUITY_SCORE: 16
ADLS_ACUITY_SCORE: 18
ADLS_ACUITY_SCORE: 18
ADLS_ACUITY_SCORE: 16

## 2022-01-13 ASSESSMENT — MIFFLIN-ST. JEOR
SCORE: 1830.25
SCORE: 1763.25

## 2022-01-13 NOTE — PROGRESS NOTES
CLINICAL NUTRITION SERVICES - REASSESSMENT NOTE     Nutrition Prescription    Recommendations:  If loose stools persist, continue to hold bowel regimen.     Malnutrition Status:    Moderate malnutrition in the context of acute on chronic illness       EVALUATION OF THE PROGRESS TOWARD GOALS   Diet: NPO (intubated)   Nutrition Support: Osmolite 1.5 Diogo @ 60ml/hr (1440ml/day) +3 pkts Prosource via OGT to provide: 2280 kcals (29 kcal/kg), 123 g PRO (1.6 g/kg), 1097 ml free H20, 293 g CHO, and 0 g fiber daily. Micro/Nx: Folic acid 1 mg, thiamine 100 mg, multivitamin.    Intake: Met ~75% needs on avg from TF over past 5 days. Also received kcal from propfol (100-500 kcal/day), now off.      NEW FINDINGS   GI/Nutrition: Currently receiving feeds @ goal rate. x1-4 BM/day with scheduled bowel regimen.     MALNUTRITION  % Intake: Decreased intake does not meet criteria  % Weight Loss: None noted. Wt today > admission wt, suspect r/t volume status.   Subcutaneous Fat Loss: Facial region:  mild and Upper arm:  moderate per assessment on 1/6  Muscle Loss: Temporal:  mild and Thoracic region (clavicle, acromium bone, deltoid, trapezius, pectoral):  Mild to moderate per assessment on 1/6  Fluid Accumulation/Edema: None noted  Malnutrition Diagnosis: Moderate malnutrition in the context of acute on chronic illness    Previous Goals   Total avg nutritional intake to meet a minimum of 25 kcal/kg and 1.2-1.5+ g PRO/kg daily (per dosing wt 78 kg).  Evaluation: Not met.     Previous Nutrition Diagnosis  Inadequate oral intake   Evaluation: No change    CURRENT NUTRITION DIAGNOSIS  Inadequate oral intake related to mechanical ventilation inhibiting ability to take nutrition PO as evidenced by enteral nutrition to meet 100% of needs.    INTERVENTIONS  None additionally at this time. See future recommendations.     Goals  Total avg nutritional intake to meet a minimum of 25 kcal/kg and 1.2 g PRO/kg daily (per dosing wt 78  kg).    Monitoring/Evaluation  Progress toward goals will be monitored and evaluated per protocol.    Kat Torres RD, [place]   [facility] RD Pager: [phone]

## 2022-01-13 NOTE — PLAN OF CARE
ICU End of Shift Summary. See flowsheets for vital signs and detailed assessment.    Changes this shift: Patient doing well.  He PS on 10/7 for 4 hours last night.  Sedation is being weaned down.  Patient seems to be tolerating all of this well.  He is following commands and moving in the bed independently.  He will be glad when his ETT is out.  Patient has had a few small stools throughout the night.  Patient vitals have been stable.    Plan: Continue to wean meds and vent.  Will continue to monitor.

## 2022-01-13 NOTE — PROGRESS NOTES
MEDICAL ICU PROGRESS NOTE  01/13/2022    Date of Service (when I saw the patient): 01/13/2022    ASSESSMENT: Brent Lau is a 41 year old male with PMH of tobacco use, alcohol use, and GERD who was admitted to the MICU as a transfer from OSH on 1/6/22 due to shock and acute hypoxic respiratory failure found to have strep pneumo PNA and MSSA bacteremia.    CHANGES and MAJOR THINGS TODAY:   - Continue to wean down on sedation  - Lasix 40mg IV x1 dose this AM, will reassess patient's I's/O's this PM to eval need for additional dose.  - Continue pressure support trials  - Increase RASS goal to 0 to -1  - Weaned off propofol    PLAN:    Neuro:  # Pain and Sedation  RAAS 0 to -1.   - Continue to wean Precedex.  - Seroquel 50 mg BID scheduled.  - Continue to wean fentanyl     # Alcohol use disorder  Per wife, patient has been drinking up to 1 L of vodka/day since Jan 28, 2021 after the sudden death of his sister. BAL elevated on OSH ED arrival. No charted or reported history of alcohol withdrawal/seizures; wife thinks there is a significant component of depression and would like him to be connected to a PCP at [facility] for mental health resources +/- psych meds at the time of discharge. Given Phenobarbitol load 1/9/22.   - Folic acid and Multivitamin Daily  - Thiamine per Wernicke protocol (250 mg daily x5 days, then 100 mg daily thereafter)  - Chemical Dependency consult when extubated      Pulmonary:  # Acute Hypoxic Respiratory Failure  # Strep Pneumo PNA  # MSSA bacteremia  # COVID infection  Patient with dense consolidative infiltrates in the lower lobes on CT c/f significant pneumonia found to have strep pneumo on blood and sputum culture as well as antigen. Extubated 1/8 and re intubated, however no documentation was made of indications. Ongoing thick secretions. CXR 1/11 improved .Procalcitonin today decreased from prior.   - Albuterol 2.5mg q4h nebulizer  - Mucomyst q4h  - Antibiotics as below,  currently Ceftriaxone 2g q24h  - Continue pressure support trials as able    Ventilation Mode: CMV/AC  (Continuous Mandatory Ventilation/ Assist Control)  FiO2 (%): 40 %  Rate Set (breaths/minute): 22 breaths/min  Tidal Volume Set (mL): 450 mL  PEEP (cm H2O): 7 cmH2O  Pressure Support (cm H2O): 8 cmH2O  Oxygen Concentration (%): 40 %  Resp: (!) 32     Cardiovascular:  # Septic shock, resolved  Likely in the setting of hypovolemia v distributive/vasoplegic v other. Got 2.25 L at OSH and 2L within first few hours of transfer.   - MAP goal >65   - ECHO with EF of 30-35% and moderate diffuse hypokinesis    # Hypertension  - Hydralazine 10mg prn for BP >180/>110     GI/Nutrition:  # Nutrition  - at goal TF @ 60mL/hr     # GERD  - PPI for stress ulcer prophylaxis      # Bowel Regimen   - Senna scheduled  - Miralax PRN      Renal/Electrolytes:  # Metabolic Acidosis, resolved  # Alcoholic Ketosis, resolved  # Lactic Acidosis, resolved  Adequate respiratory compensation. Lactate 10.5 on ED arrival, improved after some fluid resuscitation. Ketones elevated, likely in the setting of acute alcohol intoxication. CK normal.     # Volume management  Goal net negative 0.5-1L daily.  - 40 mg IVP furosemide today. Reassess in afternoon.    # Hypokalemia  # Hypomagnesemia  # Hypocalcemia   - daily labs; replete PRN     Infectious Disease:  # Sepsis   # Strep Pneumo PNA  # MSSA bacteremia  Patient had positive strep pneumo on BCx and Sputum Cx with MSSA on sputum cx. Legionella, Covid, influenza, Histo, Blasto RSV, and MRSA negative.  Antimicrobials as below. Procalcitonin downtrending.    Antimicrobials:  Ceftriaxone 1/6 - present (Day 4/14)  Zosyn 1/5- D'karena same day  Vancomycin 1/5- D'karena same day  Azithromycin 1/5- D'karena same day    1/5/22 2/2 bcx + Strep pneumo  1/6/22 sputum cx + Strep pneumo, Staph aureus  1/6/22 bcx + Staph aureus  1/7/22 bcx NGTD  1/8/22 bcx NGTD  1/9/22 bcx NGTD  1/9/22 ucx NGTD  1/9/22 sputum cx  NGTD  1/11/22 Sputum culture with 1+ candida dublinensis  1/11/22 Blood Cx x2 with NGTD    # New-onset Fever, resolved  Tmax 102.8 on 1/11 a.m. Already on ceftriaxone for MSSA bacteremia. Respiratory status has not worsened. Abd slightly distended, but stable from previous exams. Have held off on broadening antibiotics. Last febrile to 100.8 at 0000 on 1/12.  - Continue to follow UA, BCx x2 and sputum Cx.  - add-on diff to assess for eosinophils.  - Will hold on broadening abx unless fever persists or findings suggestive of untreated infection.           # COVID-19  S/p 1 Pfizer vaccine 11/17/21. COVID infection approx 11/25/21. Negative tests 1/5 and 1/6; found to be positive 1/9/22 in the setting of clinically worsening.  - Dexamethasone 6mg for 10-day course (1/9/22- )  - 5 day Remdesevir course (1/9/22- )  - Lovenox 40mg BID    Hematology:  # Acute Thrombocytopenia   # Acute Normocytic Anemia, stable  Likely in the setting of some likely alcoholic liver disease and some suppression in the setting of acute infection. No acute signs of bleeding on admission or during stay. HgB today 9.9.  - Lovenox 40mg BID  - daily CBC  - Hb goal > 7      Endocrine:  No history of DM. Increasing BG after initiating Dexamethasone 1/9. BG's in 200-300 rabge, Started low dose sliding scale insulin on 1/10.  - Increase NPH to 15 units daily around time of Decadron administration.  - Increase sliding scale to high resistance dosing.      MSK:  No active issues  - PT/OT consulted     Skin:  No active issues    General Cares/Prophylaxis:    DVT Prophylaxis: Lovenox 40mg BID; COVID dosing  GI Prophylaxis: PPI  Restraints: chemical, soft 4 point  Family Communication: updated wife, Zeynep, on the phone 1/13/22.  Code Status: Full Code    Lines/tubes/drains:  - PICC 1/6  - ETT 1/8  - Villatoro 1/9  - OG 1/8    Disposition:  - Medical ICU    Patient seen and findings/plan discussed with medical ICU staff, Dr. Lalita Medina MD  IM  PGY2      Attending note:  Patient seen, examined and discussed with the Resident physicians. All data reviewed including vital signs, medications, laboratory studies and imaging.  I agree with the assessment and plan as outlined in the above note.  The patient remains critically ill with acute respiratory failure, alcohol abuse syndrome, encephalopathy, and alcoholic liver disease.  I personally adjusted the ventilator to treat the acute respiratory failure, managed the vasopressors for shock, and managed medications for his encephalopathy.  Decreasing ventilator support- tolerating wean trials- will try to advance and hopefully extubate soon.   Completed Remdesivir and decadron.  Weaning sedation- mental status improvimng.     Total Critical Care time excluding time for teaching and procedures was 40 minutes.     Elenita Celis MD  [phone]    ========================  INTERVAL HISTORY:   No acute events overnight, Tmax of 99.9 overnight. Pressure supported at 10/7 for 4hrs last night. He is following commands this afternoon, nodding yes/no to questions appropriately. He denies pain, is asking to have the ET tube removed.     OBJECTIVE:   1. VITAL SIGNS:   Temp:  [98.3  F (36.8  C)-99.9  F (37.7  C)] 98.9  F (37.2  C)  Pulse:  [] 120  Resp:  [12-36] 32  BP: (100-144)/() 140/100  FiO2 (%):  [40 %-45 %] 40 %  SpO2:  [94 %-100 %] 94 %  Ventilation Mode: CMV/AC  (Continuous Mandatory Ventilation/ Assist Control)  FiO2 (%): 40 %  Rate Set (breaths/minute): 22 breaths/min  Tidal Volume Set (mL): 450 mL  PEEP (cm H2O): 7 cmH2O  Pressure Support (cm H2O): 8 cmH2O  Oxygen Concentration (%): 40 %  Resp: (!) 32    2. INTAKE/ OUTPUT:   I/O last 3 completed shifts:  In: 3209.31 [I.V.:1404.31; NG/GT:365]  Out: 1250 [Urine:1250]    3. PHYSICAL EXAMINATION:  General: supine, comfortably laying in bed  Neuro: Comfortable, laying in bed, following commands appropriately  Pulm/Resp: Coarse breath sounds in bilateral anterior  lung fields, ETT in place.  CV: RRR, extremities WWP  Abdomen: Soft, slight-distention, nontender  :  newberry catheter in place, urine yellow and clear  Incisions/Skin: no rash or lesions visible  Lymph: no LE edema    4. LABS:   Arterial Blood Gases   Recent Labs   Lab 01/09/22  1740 01/06/22  1611   PH 7.50* 7.57*   PCO2 34* 26*   PO2 84 91   HCO3 26 24     Complete Blood Count   Recent Labs   Lab 01/13/22  0342 01/12/22  0354 01/11/22  1024 01/11/22 0338   WBC 16.5* 17.0* 15.0* 12.3*  12.3*   HGB 9.2* 9.8* 9.9* 9.6*    296 172 158     Basic Metabolic Panel  Recent Labs   Lab 01/13/22  1137 01/13/22  0752 01/13/22  0358 01/13/22  0342 01/12/22  0749 01/12/22  0353 01/11/22  0835 01/11/22  0832 01/11/22  0346 01/11/22 0338 01/10/22  2023 01/10/22  1834   NA  --   --   --  138  --  138  --   --   --  139  --  133   POTASSIUM  --   --   --  4.0  --  3.3*  --  4.3  --  3.4  --  3.8   CHLORIDE  --   --   --  105  --  104  --   --   --  107  --  101   CO2  --   --   --  28  --  28  --   --   --  26  --  24   BUN  --   --   --  18  --  16  --   --   --  15  --  15   CR  --   --   --  0.61*  --  0.60*  --   --   --  0.56*  --  0.62*   * 184* 146* 157*   < > 271*   < >  --    < > 268*   < > 330*    < > = values in this interval not displayed.     Liver Function Tests  Recent Labs   Lab 01/13/22  0342 01/12/22  0353 01/11/22  0338 01/10/22  0344   AST 48* 50* 100* 97*   ALT 57 61 73* 42   ALKPHOS 106 114 116 107   BILITOTAL 0.2 0.3 0.3 0.6   ALBUMIN 1.4* 1.4* 1.2* 1.2*     Coagulation Profile  No lab results found in last 7 days.    5. RADIOLOGY:   No results found for this or any previous visit (from the past 24 hour(s)).

## 2022-01-14 ENCOUNTER — APPOINTMENT (OUTPATIENT)
Dept: PHYSICAL THERAPY | Facility: CLINIC | Age: 42
DRG: 870 | End: 2022-01-14
Attending: SURGERY
Payer: COMMERCIAL

## 2022-01-14 ENCOUNTER — APPOINTMENT (OUTPATIENT)
Dept: SPEECH THERAPY | Facility: CLINIC | Age: 42
DRG: 870 | End: 2022-01-14
Attending: SURGERY
Payer: COMMERCIAL

## 2022-01-14 LAB
ALBUMIN SERPL-MCNC: 1.4 G/DL (ref 3.4–5)
ALP SERPL-CCNC: 98 U/L (ref 40–150)
ALT SERPL W P-5'-P-CCNC: 58 U/L (ref 0–70)
ANION GAP SERPL CALCULATED.3IONS-SCNC: 5 MMOL/L (ref 3–14)
AST SERPL W P-5'-P-CCNC: 51 U/L (ref 0–45)
BACTERIA BLD CULT: NO GROWTH
BASE EXCESS BLDV CALC-SCNC: 3.1 MMOL/L (ref -7.7–1.9)
BASE EXCESS BLDV CALC-SCNC: 4.1 MMOL/L (ref -7.7–1.9)
BILIRUB SERPL-MCNC: 0.2 MG/DL (ref 0.2–1.3)
BUN SERPL-MCNC: 20 MG/DL (ref 7–30)
CA-I BLD-MCNC: 4.3 MG/DL (ref 4.4–5.2)
CALCIUM SERPL-MCNC: 8.2 MG/DL (ref 8.5–10.1)
CHLORIDE BLD-SCNC: 106 MMOL/L (ref 94–109)
CO2 SERPL-SCNC: 28 MMOL/L (ref 20–32)
CREAT SERPL-MCNC: 0.64 MG/DL (ref 0.66–1.25)
ERYTHROCYTE [DISTWIDTH] IN BLOOD BY AUTOMATED COUNT: 13.7 % (ref 10–15)
GFR SERPL CREATININE-BSD FRML MDRD: >90 ML/MIN/1.73M2
GLUCOSE BLD-MCNC: 154 MG/DL (ref 70–99)
GLUCOSE BLDC GLUCOMTR-MCNC: 109 MG/DL (ref 70–99)
GLUCOSE BLDC GLUCOMTR-MCNC: 132 MG/DL (ref 70–99)
GLUCOSE BLDC GLUCOMTR-MCNC: 144 MG/DL (ref 70–99)
GLUCOSE BLDC GLUCOMTR-MCNC: 148 MG/DL (ref 70–99)
GLUCOSE BLDC GLUCOMTR-MCNC: 170 MG/DL (ref 70–99)
HCO3 BLDV-SCNC: 28 MMOL/L (ref 21–28)
HCO3 BLDV-SCNC: 29 MMOL/L (ref 21–28)
HCT VFR BLD AUTO: 26 % (ref 40–53)
HGB BLD-MCNC: 8.5 G/DL (ref 13.3–17.7)
MAGNESIUM SERPL-MCNC: 2.1 MG/DL (ref 1.6–2.3)
MCH RBC QN AUTO: 31.8 PG (ref 26.5–33)
MCHC RBC AUTO-ENTMCNC: 32.7 G/DL (ref 31.5–36.5)
MCV RBC AUTO: 97 FL (ref 78–100)
O2/TOTAL GAS SETTING VFR VENT: 30 %
O2/TOTAL GAS SETTING VFR VENT: 40 %
PCO2 BLDV: 40 MM HG (ref 40–50)
PCO2 BLDV: 47 MM HG (ref 40–50)
PH BLDV: 7.39 [PH] (ref 7.32–7.43)
PH BLDV: 7.46 [PH] (ref 7.32–7.43)
PHOSPHATE SERPL-MCNC: 4.2 MG/DL (ref 2.5–4.5)
PLATELET # BLD AUTO: 295 10E3/UL (ref 150–450)
PO2 BLDV: 40 MM HG (ref 25–47)
PO2 BLDV: 52 MM HG (ref 25–47)
POTASSIUM BLD-SCNC: 3.7 MMOL/L (ref 3.4–5.3)
PROT SERPL-MCNC: 6.7 G/DL (ref 6.8–8.8)
RBC # BLD AUTO: 2.67 10E6/UL (ref 4.4–5.9)
SODIUM SERPL-SCNC: 139 MMOL/L (ref 133–144)
WBC # BLD AUTO: 10.1 10E3/UL (ref 4–11)

## 2022-01-14 PROCEDURE — 250N000013 HC RX MED GY IP 250 OP 250 PS 637: Performed by: SURGERY

## 2022-01-14 PROCEDURE — 99291 CRITICAL CARE FIRST HOUR: CPT | Mod: GC | Performed by: INTERNAL MEDICINE

## 2022-01-14 PROCEDURE — 250N000011 HC RX IP 250 OP 636: Performed by: STUDENT IN AN ORGANIZED HEALTH CARE EDUCATION/TRAINING PROGRAM

## 2022-01-14 PROCEDURE — 250N000013 HC RX MED GY IP 250 OP 250 PS 637

## 2022-01-14 PROCEDURE — 97530 THERAPEUTIC ACTIVITIES: CPT | Mod: GP

## 2022-01-14 PROCEDURE — 999N000157 HC STATISTIC RCP TIME EA 10 MIN

## 2022-01-14 PROCEDURE — 83735 ASSAY OF MAGNESIUM: CPT | Performed by: STUDENT IN AN ORGANIZED HEALTH CARE EDUCATION/TRAINING PROGRAM

## 2022-01-14 PROCEDURE — 250N000011 HC RX IP 250 OP 636

## 2022-01-14 PROCEDURE — 94003 VENT MGMT INPAT SUBQ DAY: CPT

## 2022-01-14 PROCEDURE — 250N000009 HC RX 250: Performed by: SURGERY

## 2022-01-14 PROCEDURE — 84100 ASSAY OF PHOSPHORUS: CPT | Performed by: STUDENT IN AN ORGANIZED HEALTH CARE EDUCATION/TRAINING PROGRAM

## 2022-01-14 PROCEDURE — 85027 COMPLETE CBC AUTOMATED: CPT | Performed by: STUDENT IN AN ORGANIZED HEALTH CARE EDUCATION/TRAINING PROGRAM

## 2022-01-14 PROCEDURE — 82330 ASSAY OF CALCIUM: CPT | Performed by: STUDENT IN AN ORGANIZED HEALTH CARE EDUCATION/TRAINING PROGRAM

## 2022-01-14 PROCEDURE — 82803 BLOOD GASES ANY COMBINATION: CPT

## 2022-01-14 PROCEDURE — 94640 AIRWAY INHALATION TREATMENT: CPT | Mod: 76

## 2022-01-14 PROCEDURE — 250N000009 HC RX 250

## 2022-01-14 PROCEDURE — 250N000009 HC RX 250: Performed by: STUDENT IN AN ORGANIZED HEALTH CARE EDUCATION/TRAINING PROGRAM

## 2022-01-14 PROCEDURE — 82803 BLOOD GASES ANY COMBINATION: CPT | Performed by: STUDENT IN AN ORGANIZED HEALTH CARE EDUCATION/TRAINING PROGRAM

## 2022-01-14 PROCEDURE — 250N000013 HC RX MED GY IP 250 OP 250 PS 637: Performed by: STUDENT IN AN ORGANIZED HEALTH CARE EDUCATION/TRAINING PROGRAM

## 2022-01-14 PROCEDURE — 94640 AIRWAY INHALATION TREATMENT: CPT

## 2022-01-14 PROCEDURE — 200N000002 HC R&B ICU UMMC

## 2022-01-14 PROCEDURE — 92610 EVALUATE SWALLOWING FUNCTION: CPT | Mod: GN

## 2022-01-14 PROCEDURE — 999N000111 HC STATISTIC OT IP EVAL DEFER: Performed by: OCCUPATIONAL THERAPIST

## 2022-01-14 PROCEDURE — 82947 ASSAY GLUCOSE BLOOD QUANT: CPT | Performed by: STUDENT IN AN ORGANIZED HEALTH CARE EDUCATION/TRAINING PROGRAM

## 2022-01-14 RX ORDER — POTASSIUM CHLORIDE 1.5 G/1.58G
20 POWDER, FOR SOLUTION ORAL ONCE
Status: COMPLETED | OUTPATIENT
Start: 2022-01-14 | End: 2022-01-14

## 2022-01-14 RX ORDER — FUROSEMIDE 10 MG/ML
40 INJECTION INTRAMUSCULAR; INTRAVENOUS ONCE
Status: COMPLETED | OUTPATIENT
Start: 2022-01-14 | End: 2022-01-14

## 2022-01-14 RX ADMIN — POTASSIUM CHLORIDE 20 MEQ: 1.5 POWDER, FOR SOLUTION ORAL at 05:14

## 2022-01-14 RX ADMIN — ALBUTEROL SULFATE 2.5 MG: 2.5 SOLUTION RESPIRATORY (INHALATION) at 03:32

## 2022-01-14 RX ADMIN — FOLIC ACID 1 MG: 1 TABLET ORAL at 08:53

## 2022-01-14 RX ADMIN — DEXMEDETOMIDINE HYDROCHLORIDE 0.5 MCG/KG/HR: 400 INJECTION INTRAVENOUS at 09:11

## 2022-01-14 RX ADMIN — ALBUTEROL SULFATE 2.5 MG: 2.5 SOLUTION RESPIRATORY (INHALATION) at 08:20

## 2022-01-14 RX ADMIN — THERA TABS 1 TABLET: TAB at 08:53

## 2022-01-14 RX ADMIN — CEFTRIAXONE SODIUM 2 G: 2 INJECTION, POWDER, FOR SOLUTION INTRAMUSCULAR; INTRAVENOUS at 09:09

## 2022-01-14 RX ADMIN — FUROSEMIDE 40 MG: 10 INJECTION, SOLUTION INTRAVENOUS at 10:50

## 2022-01-14 RX ADMIN — ENOXAPARIN SODIUM 40 MG: 40 INJECTION SUBCUTANEOUS at 08:54

## 2022-01-14 RX ADMIN — Medication 40 MG: at 08:53

## 2022-01-14 RX ADMIN — ENOXAPARIN SODIUM 40 MG: 40 INJECTION SUBCUTANEOUS at 19:59

## 2022-01-14 RX ADMIN — ALBUTEROL SULFATE 2.5 MG: 2.5 SOLUTION RESPIRATORY (INHALATION) at 21:26

## 2022-01-14 RX ADMIN — THIAMINE HCL TAB 100 MG 100 MG: 100 TAB at 08:54

## 2022-01-14 RX ADMIN — Medication 1 PACKET: at 08:54

## 2022-01-14 RX ADMIN — QUETIAPINE FUMARATE 50 MG: 50 TABLET ORAL at 22:47

## 2022-01-14 RX ADMIN — ACETYLCYSTEINE 2 ML: 100 SOLUTION ORAL; RESPIRATORY (INHALATION) at 21:27

## 2022-01-14 RX ADMIN — DEXAMETHASONE SODIUM PHOSPHATE 6 MG: 4 INJECTION, SOLUTION INTRA-ARTICULAR; INTRALESIONAL; INTRAMUSCULAR; INTRAVENOUS; SOFT TISSUE at 11:43

## 2022-01-14 RX ADMIN — ALBUTEROL SULFATE 2.5 MG: 2.5 SOLUTION RESPIRATORY (INHALATION) at 11:59

## 2022-01-14 ASSESSMENT — ACTIVITIES OF DAILY LIVING (ADL)
ADLS_ACUITY_SCORE: 11
ADLS_ACUITY_SCORE: 16
ADLS_ACUITY_SCORE: 17
ADLS_ACUITY_SCORE: 16
ADLS_ACUITY_SCORE: 17
ADLS_ACUITY_SCORE: 16
ADLS_ACUITY_SCORE: 17
ADLS_ACUITY_SCORE: 16
ADLS_ACUITY_SCORE: 16
ADLS_ACUITY_SCORE: 17
ADLS_ACUITY_SCORE: 17
ADLS_ACUITY_SCORE: 16
ADLS_ACUITY_SCORE: 17
ADLS_ACUITY_SCORE: 15
ADLS_ACUITY_SCORE: 17
ADLS_ACUITY_SCORE: 16

## 2022-01-14 ASSESSMENT — MIFFLIN-ST. JEOR: SCORE: 1787.25

## 2022-01-14 NOTE — PLAN OF CARE
4C Occupational Therapy - Cancel/Defer    OT evaluation orders received and appreciated. Per PT, patient pivot transfers w/ Ax1, is unsteady w/ primary deficits in balance and endurance. Pt demonstrating good fine motor coordination and is alert, oriented and following commands accurately. Per collaboration w/ PT, plan for PT to follow patient for strength, endurance, mobility, and discharge recommendations. OT will defer to PT and complete orders at this time. Please re-order OT if pt's hospitalization is extended and/or new ADL deficits are identified.

## 2022-01-14 NOTE — PROGRESS NOTES
1/14/2022      CD consult acknowledged.   Pt will be seen Monday by [facility] team. If pt discharges this weekend, pt can call Mental Health and Addiction Services Line: [phone] and make an appt through [employer] for an evaluation.     JEFERSON Loza  Phone: [phone]  Email: [email or web address]

## 2022-01-14 NOTE — PLAN OF CARE
Patient continues to be ventilated and was able to pressure support for 6 hours today at 8/7. Needed to be switched by to CMV due to RR in low 40's and HR in 120's. Remains fentanyl and precedex with RASS +1 to -1. Refer to emar for dosing. Tube feedings at goal and tolerating with no problems. Tolerated sitting in chair for 2 hours today. Was able to pivot transfer to chair but required mechanical lift to get back to bed. Refer to flowsheets for documentation.

## 2022-01-14 NOTE — PROGRESS NOTES
MEDICAL ICU PROGRESS NOTE  01/14/2022    Date of Service (when I saw the patient): 01/14/2022    ASSESSMENT: Brent Lau is a 41 year old male with PMH of tobacco use, alcohol use, and GERD who was admitted to the MICU as a transfer from OSH on 1/6/22 due to shock and acute hypoxic respiratory failure found to have strep pneumo PNA and MSSA bacteremia.    CHANGES and MAJOR THINGS TODAY:   - Extubated today to HFNC  - Lasix 40mg IV x1 dose this AM, will reassess patient's I's/O's this PM to eval need for additional dose.  - Will attempt to wean down on Precedex/Fentanyl    PLAN:    Neuro:  # Pain and Sedation  RAAS 0 to -1.   - Continue to wean Precedex.  - Seroquel 50 mg BID scheduled.  - Continue to wean fentanyl     # Alcohol use disorder  Per wife, patient has been drinking up to 1 L of vodka/day since Jan 28, 2021 after the sudden death of his sister. BAL elevated on OSH ED arrival. No charted or reported history of alcohol withdrawal/seizures; wife thinks there is a significant component of depression and would like him to be connected to a PCP at [facility] for mental health resources +/- psych meds at the time of discharge. Given Phenobarbitol load 1/9/22.   - Folic acid and Multivitamin Daily  - Thiamine per Wernicke protocol (250 mg daily x5 days, then 100 mg daily thereafter)  - Chemical Dependency consult when extubated      Pulmonary:  # Acute Hypoxic Respiratory Failure  # Strep Pneumo PNA  # MSSA bacteremia  # COVID infection  Patient with dense consolidative infiltrates in the lower lobes on CT c/f significant pneumonia found to have strep pneumo on blood and sputum culture as well as antigen. Extubated 1/8 and re intubated, however no documentation was made of indications. Ongoing thick secretions. CXR 1/11 improved . Had continued improvement in respiratory status. Extubated on 1/14/22 to HFNC.  - Continue HFNC, goal O2 sats >90%, wean as able  - Albuterol 2.5mg q4h nebulizer  -  Mucomyst q4h  - Antibiotics as below, currently Ceftriaxone 2g q24h (14-day course)     Cardiovascular:  # Septic shock, resolved  Likely in the setting of hypovolemia v distributive/vasoplegic v other. Got 2.25 L at OSH and 2L within first few hours of transfer.   - MAP goal >65   - ECHO with EF of 30-35% and moderate diffuse hypokinesis    # Hypertension  - Hydralazine 10mg prn for BP >180/>110     GI/Nutrition:  # Nutrition  - at goal TF @ 60mL/hr     # GERD  - PPI for stress ulcer prophylaxis      # Bowel Regimen   - Senna scheduled  - Miralax PRN      Renal/Electrolytes:  # Metabolic Acidosis, resolved  # Alcoholic Ketosis, resolved  # Lactic Acidosis, resolved  Adequate respiratory compensation. Lactate 10.5 on ED arrival, improved after some fluid resuscitation. Ketones elevated, likely in the setting of acute alcohol intoxication. CK normal.     # Volume management  Goal net negative 0.5-1L daily.  - 40 mg IVP furosemide today. Reassess in afternoon.    # Hypokalemia  # Hypomagnesemia  # Hypocalcemia   - daily labs; replete PRN     Infectious Disease:  # Sepsis   # Strep Pneumo PNA  # MSSA bacteremia  Patient had positive strep pneumo on BCx and Sputum Cx with MSSA on sputum cx. Legionella, Covid, influenza, Histo, Blasto RSV, and MRSA negative.  Antimicrobials as below. Procalcitonin downtrending.    Antimicrobials:  Ceftriaxone 1/6 - present (final dose 1/19 for total 14-day course)  Zosyn 1/5- D'karena same day  Vancomycin 1/5- D'karena same day  Azithromycin 1/5- D'karena same day    1/5/22 2/2 bcx + Strep pneumo  1/6/22 sputum cx + Strep pneumo, Staph aureus  1/6/22 bcx + Staph aureus  1/7/22 bcx NGTD  1/8/22 bcx NGTD  1/9/22 bcx NGTD  1/9/22 ucx NGTD  1/9/22 sputum cx NGTD  1/11/22 Sputum culture with 1+ candida dublinensis  1/11/22 Blood Cx x2 with NGTD    # New-onset Fever, resolved  Tmax 102.8 on 1/11 a.m. Already on ceftriaxone for MSSA bacteremia. Respiratory status has not worsened. Abd slightly  distended, but stable from previous exams. Have held off on broadening antibiotics. Last febrile to 100.8 at 0000 on 1/12.  - Empiric antibiotics as above  - Continue to follow UA, BCx x2 and sputum Cx.  - add-on diff to assess for eosinophils.  - Will hold on broadening abx unless fever persists or findings suggestive of untreated infection.     # COVID-19  S/p 1 Pfizer vaccine 11/17/21. COVID infection approx 11/25/21. Negative tests 1/5 and 1/6; found to be positive 1/9/22 in the setting of clinically worsening.  - Dexamethasone 6mg for 10-day course (1/9/22- )  - 5 day Remdesevir course (1/9/22- )  - Lovenox 40mg BID    Hematology:  # Acute Thrombocytopenia   # Acute Normocytic Anemia, stable  Likely in the setting of some likely alcoholic liver disease and some suppression in the setting of acute infection. No acute signs of bleeding on admission or during stay. HgB today 9.9.  - Lovenox 40mg BID  - daily CBC  - Hb goal > 7      Endocrine:  No history of DM. Increasing BG after initiating Dexamethasone 1/9. BG's in 200-300 rabge, Started low dose sliding scale insulin on 1/10.  - Increase NPH to 15 units daily around time of Decadron administration.  - Increase sliding scale to high resistance dosing.      MSK:  No active issues  - PT/OT consulted     Skin:  No active issues    General Cares/Prophylaxis:    DVT Prophylaxis: Lovenox 40mg BID; COVID dosing  GI Prophylaxis: PPI  Restraints: chemical, soft 4 point  Family Communication: updated wife, Zeynep, on the phone 1/13/22.  Code Status: Full Code    Lines/tubes/drains:  - PICC 1/6  - ETT 1/8  - Villatoro 1/9  - OG 1/8    Disposition:  - Medical ICU    Patient seen and findings/plan discussed with medical ICU staff, Dr. Lalita Medina MD  IM PGY2      Attending note:  Patient seen, examined and discussed with the Resident physicians. All data reviewed including vital signs, medications, laboratory studies and imaging.  I agree with the assessment and  plan as outlined in the above note.  The patient remains critically ill with acute respiratory failure, alcohol abuse syndrome, encephalopathy, and alcoholic liver disease.  I personally adjusted the ventilator to treat the acute respiratory failure, managed the vasopressors for shock, and managed medications for his encephalopathy.  Decreasing ventilator support- tolerating wean trials yesterday, will extubate today.  Mental status improving on current regimen. Will need chem-dep evaluation prior to discharge.     Total Critical Care time excluding time for teaching and procedures was 40 minutes.     Elenita Celis MD  [phone]  ========================  INTERVAL HISTORY:   No acute events overnight. This morning responding appropriately to commands, indicating readiness for ETT removal. Endorses throat pain but denies pain elsewhere in his body.    OBJECTIVE:   1. VITAL SIGNS:   Temp:  [98.7  F (37.1  C)-99.6  F (37.6  C)] 99.6  F (37.6  C)  Pulse:  [] 97  Resp:  [17-32] 28  BP: ()/() 103/66  FiO2 (%):  [30 %-40 %] 30 %  SpO2:  [94 %-100 %] 98 %    2. INTAKE/ OUTPUT:   I/O last 3 completed shifts:  In: 2961.56 [I.V.:921.56; NG/GT:600]  Out: 3400 [Urine:3400]    3. PHYSICAL EXAMINATION:  General: supine, comfortably laying in bed  Neuro: Comfortable, laying in bed, following commands appropriately  Pulm/Resp: Coarse breath sounds in bilateral anterior lung fields, ETT in place.  CV: RRR, extremities WWP  Abdomen: Soft, slight-distention, nontender  :  newberry catheter in place, urine yellow and clear  Incisions/Skin: no rash or lesions visible  Lymph: no LE edema    4. LABS:   Arterial Blood Gases   Recent Labs   Lab 01/09/22  1740   PH 7.50*   PCO2 34*   PO2 84   HCO3 26     Complete Blood Count   Recent Labs   Lab 01/14/22  0319 01/13/22  0342 01/12/22  0354 01/11/22  1024   WBC 10.1 16.5* 17.0* 15.0*   HGB 8.5* 9.2* 9.8* 9.9*    303 296 172     Basic Metabolic Panel  Recent Labs   Lab  01/14/22  0856 01/14/22 0319 01/14/22 0318 01/13/22  2334 01/13/22 0358 01/13/22 0342 01/12/22  0749 01/12/22 0353 01/11/22  0835 01/11/22  0832 01/11/22 0346 01/11/22 0338   NA  --  139  --   --   --  138  --  138  --   --   --  139   POTASSIUM  --  3.7  --   --   --  4.0  --  3.3*  --  4.3  --  3.4   CHLORIDE  --  106  --   --   --  105  --  104  --   --   --  107   CO2  --  28  --   --   --  28  --  28  --   --   --  26   BUN  --  20  --   --   --  18  --  16  --   --   --  15   CR  --  0.64*  --   --   --  0.61*  --  0.60*  --   --   --  0.56*   * 154* 132* 161*   < > 157*   < > 271*   < >  --    < > 268*    < > = values in this interval not displayed.     Liver Function Tests  Recent Labs   Lab 01/14/22 0319 01/13/22 0342 01/12/22 0353 01/11/22 0338   AST 51* 48* 50* 100*   ALT 58 57 61 73*   ALKPHOS 98 106 114 116   BILITOTAL 0.2 0.2 0.3 0.3   ALBUMIN 1.4* 1.4* 1.4* 1.2*     Coagulation Profile  No lab results found in last 7 days.    5. RADIOLOGY:   No results found for this or any previous visit (from the past 24 hour(s)).

## 2022-01-14 NOTE — PROGRESS NOTES
01/14/22 1443   General Information   Onset of Illness/Injury or Date of Surgery 01/06/22   Referring Physician Enzo Medina MD   Patient/Family Therapy Goal Statement (SLP) none stated    Pertinent History of Current Problem Brent Lau is a 41 year old male with PMH of tobacco use, alcohol use, and GERD who was admitted to the MICU as a transfer from OS on 1/6/22 due to shock and acute hypoxic respiratory failure found to have strep pneumo PNA and MSSA bacteremia.   General Observations Pt is alert and agreeable to evaluation.    Past History of Dysphagia Pt reports he consumes smaller portions d/t acid reflux, otherwise denies dysphagia    Type of Evaluation   Type of Evaluation Swallow Evaluation   Oral Motor   Oral Musculature generally intact   Structural Abnormalities none present   Mucosal Quality dry;adequate   Dentition (Oral Motor)   Dentition (Oral Motor) adequate dentition   Facial Symmetry (Oral Motor)   Facial Symmetry (Oral Motor) WNL   Lip Function (Oral Motor)   Lip Range of Motion (Oral Motor) WNL   Tongue Function (Oral Motor)   Tongue ROM (Oral Motor) WNL   Cough/Swallow/Gag Reflex (Oral Motor)   Volitional Throat Clear/Cough (Oral Motor) WNL   Vocal Quality/Secretion Management (Oral Motor)   Vocal Quality (Oral Motor) hoarse   Secretion Management (Oral Motor) WNL   General Swallowing Observations   Current Diet/Method of Nutritional Intake (General Swallowing Observations, NIS) NPO   Respiratory Support (General Swallowing Observations) nasal cannula;supplemental oxygen  (HFND 30% FIO2 30 lpm)   Swallowing Evaluation Clinical swallow evaluation   Clinical Swallow Evaluation   Feeding Assistance set up only required   Clinical Swallow Evaluation Textures Trialed thin liquids;pureed;solid foods   Clinical Swallow Eval: Thin Liquid Texture Trial   Mode of Presentation, Thin Liquids cup;straw;self-fed   Volume of Liquid or Food Presented 6 oz    Oral Phase of Swallow WFL   Pharyngeal  Phase of Swallow intact   Diagnostic Statement No overt s/sx of aspiration, no changes in breath sounds or vocal quality. Delayed cough x 1 not appearing directly related to PO Intake.    Clinical Swallow Evaluation: Puree Solid Texture Trial   Mode of Presentation, Puree spoon;self-fed   Volume of Puree Presented 2 oz    Oral Phase, Puree WFL   Pharyngeal Phase, Puree intact   Diagnostic Statement No overt s/sx of aspiration    Clinical Swallow Evaluation: Solid Food Texture Trial   Mode of Presentation fed by clinician   Volume Presented 1 cracker   Oral Phase WFL   Oral Residue   (slight increase time needed to clear)   Pharyngeal Phase intact   Diagnostic Statement No overt s/sx of aspiration. Pt denied any difficulty, pain, or globus sensation    Esophageal Phase of Swallow   Patient reports or presents with symptoms of esophageal dysphagia Yes   Esophageal comments Pt reports acid reflux   Swallowing Recommendations   Diet Consistency Recommendations regular diet;thin liquids (level 0)   Supervision Level for Intake close supervision needed   Mode of Delivery Recommendations bolus size, small;food moistened;slow rate of intake   Swallowing Maneuver Recommendations alternate food and liquid intake   Monitoring/Assistance Required (Eating/Swallowing) check mouth frequently for oral residue/pocketing;cue for finger/lingual sweep if oral pocketing present;stop eating activities when fatigue is present;monitor for cough or change in vocal quality with intake   Recommended Feeding/Eating Techniques (Swallow Eval) maintain upright sitting position for eating;minimize distractions during oral intake;maintain upright posture during/after eating for 30 minutes;provide 6 smaller meals throughout day   Medication Administration Recommendations, Swallowing (SLP) As per pt preference    Instrumental Assessment Recommendations instrumental evaluation not recommended at this time   General Therapy Interventions   Planned  Therapy Interventions Dysphagia Treatment   Dysphagia treatment Instruction of safe swallow strategies   SLP Therapy Assessment/Plan   Criteria for Skilled Therapeutic Interventions Met (SLP Eval) yes;treatment indicated   SLP Diagnosis Mild oropharyngeal dysphagia    Rehab Potential (SLP Eval) good, to achieve stated therapy goals   Therapy Frequency (SLP Eval) 4 times/wk   Predicted Duration of Therapy Intervention (SLP Eval) 2 weeks   Comment, Therapy Assessment/Plan (SLP) Pt seen for clinical swallow evaluation. He consumed thin liquids, greater than 6 oz with a delayed cough x 1 that was not clearly related to PO intake. He otherwise consumed thin liquids without overt s/sx of aspiration, no changes in breath sounds or vocal quality. Puree and regular solids were also notable for adequate manipulation, pt independently using liquid washes and no overt s/sx of aspiration. Pt denied any difficulty, pain, or globus sensation. Mild oropharyngeal dysphagia d/t deconditioned state and s/p extubation. Recommend regular diet and thin liquids - upright position, slow rate, small bites/sips, alternate solids and liquids. Nursing to monitor PO intake closely. Initiate SLP services for dysphagia.   Therapy Plan Review/Discharge Plan (SLP)   Therapy Plan Review (SLP) evaluation/treatment results reviewed;care plan/treatment goals reviewed;risks/benefits reviewed;participants voiced agreement with care plan;participants included;patient   SLP Discharge Planning    SLP Discharge Recommendation (DC Rec) home with assist;[facility]-Motivated patient will benefit from intensive, interdisciplinary therapy.  Anticipate will be able to tolerate 3 hours of therapy per day   SLP Rationale for DC Rec Dysphagia, below baseline    SLP Brief overview of current status  Mild oropharyngeal dysphagia d/t deconditioned state and s/p extubation. Recommend regular diet and thin liquids - upright position, slow rate, small bites/sips,  alternate solids and liquids. Nursing to monitor PO intake closely. Initiate SLP services for dysphagia.    Total Evaluation Time   Total Evaluation Time (Minutes) 10

## 2022-01-14 NOTE — PLAN OF CARE
ICU End of Shift Summary. See flowsheets for vital signs and detailed assessment.    Changes this shift: No acute changes. Follows commands, calls appropriately. Precedex @ 0.5, fent @ 50. CMV 40%/22/450/7. No BM this shift. Adequate UO. K+ replaced.     Plan: Extubate today?

## 2022-01-15 LAB
ALBUMIN SERPL-MCNC: 1.7 G/DL (ref 3.4–5)
ALP SERPL-CCNC: 101 U/L (ref 40–150)
ALT SERPL W P-5'-P-CCNC: 62 U/L (ref 0–70)
ANION GAP SERPL CALCULATED.3IONS-SCNC: 8 MMOL/L (ref 3–14)
AST SERPL W P-5'-P-CCNC: 51 U/L (ref 0–45)
BACTERIA BLD CULT: NO GROWTH
BILIRUB SERPL-MCNC: 0.4 MG/DL (ref 0.2–1.3)
BUN SERPL-MCNC: 20 MG/DL (ref 7–30)
CA-I BLD-MCNC: 4.5 MG/DL (ref 4.4–5.2)
CALCIUM SERPL-MCNC: 8.8 MG/DL (ref 8.5–10.1)
CHLORIDE BLD-SCNC: 100 MMOL/L (ref 94–109)
CO2 SERPL-SCNC: 27 MMOL/L (ref 20–32)
CREAT SERPL-MCNC: 0.65 MG/DL (ref 0.66–1.25)
ERYTHROCYTE [DISTWIDTH] IN BLOOD BY AUTOMATED COUNT: 13.2 % (ref 10–15)
GFR SERPL CREATININE-BSD FRML MDRD: >90 ML/MIN/1.73M2
GLUCOSE BLD-MCNC: 99 MG/DL (ref 70–99)
GLUCOSE BLDC GLUCOMTR-MCNC: 100 MG/DL (ref 70–99)
GLUCOSE BLDC GLUCOMTR-MCNC: 117 MG/DL (ref 70–99)
GLUCOSE BLDC GLUCOMTR-MCNC: 189 MG/DL (ref 70–99)
GLUCOSE BLDC GLUCOMTR-MCNC: 58 MG/DL (ref 70–99)
GLUCOSE BLDC GLUCOMTR-MCNC: 65 MG/DL (ref 70–99)
GLUCOSE BLDC GLUCOMTR-MCNC: 88 MG/DL (ref 70–99)
GLUCOSE BLDC GLUCOMTR-MCNC: 88 MG/DL (ref 70–99)
GLUCOSE BLDC GLUCOMTR-MCNC: 94 MG/DL (ref 70–99)
GLUCOSE BLDC GLUCOMTR-MCNC: 97 MG/DL (ref 70–99)
HCT VFR BLD AUTO: 28 % (ref 40–53)
HGB BLD-MCNC: 9.3 G/DL (ref 13.3–17.7)
MAGNESIUM SERPL-MCNC: 2.1 MG/DL (ref 1.6–2.3)
MCH RBC QN AUTO: 31.8 PG (ref 26.5–33)
MCHC RBC AUTO-ENTMCNC: 33.2 G/DL (ref 31.5–36.5)
MCV RBC AUTO: 96 FL (ref 78–100)
PHOSPHATE SERPL-MCNC: 4.9 MG/DL (ref 2.5–4.5)
PLATELET # BLD AUTO: 339 10E3/UL (ref 150–450)
POTASSIUM BLD-SCNC: 3.7 MMOL/L (ref 3.4–5.3)
PROT SERPL-MCNC: 7.7 G/DL (ref 6.8–8.8)
RBC # BLD AUTO: 2.92 10E6/UL (ref 4.4–5.9)
SODIUM SERPL-SCNC: 135 MMOL/L (ref 133–144)
WBC # BLD AUTO: 9 10E3/UL (ref 4–11)

## 2022-01-15 PROCEDURE — 272N000054 HC CANNULA HIGH FLOW, ADULT

## 2022-01-15 PROCEDURE — 99291 CRITICAL CARE FIRST HOUR: CPT | Mod: GC | Performed by: INTERNAL MEDICINE

## 2022-01-15 PROCEDURE — 85027 COMPLETE CBC AUTOMATED: CPT | Performed by: STUDENT IN AN ORGANIZED HEALTH CARE EDUCATION/TRAINING PROGRAM

## 2022-01-15 PROCEDURE — 250N000013 HC RX MED GY IP 250 OP 250 PS 637

## 2022-01-15 PROCEDURE — 250N000011 HC RX IP 250 OP 636: Performed by: SURGERY

## 2022-01-15 PROCEDURE — 250N000013 HC RX MED GY IP 250 OP 250 PS 637: Performed by: PHYSICIAN ASSISTANT

## 2022-01-15 PROCEDURE — 99232 SBSQ HOSP IP/OBS MODERATE 35: CPT | Performed by: STUDENT IN AN ORGANIZED HEALTH CARE EDUCATION/TRAINING PROGRAM

## 2022-01-15 PROCEDURE — 250N000009 HC RX 250: Performed by: SURGERY

## 2022-01-15 PROCEDURE — 250N000011 HC RX IP 250 OP 636: Performed by: STUDENT IN AN ORGANIZED HEALTH CARE EDUCATION/TRAINING PROGRAM

## 2022-01-15 PROCEDURE — 84100 ASSAY OF PHOSPHORUS: CPT | Performed by: STUDENT IN AN ORGANIZED HEALTH CARE EDUCATION/TRAINING PROGRAM

## 2022-01-15 PROCEDURE — 250N000013 HC RX MED GY IP 250 OP 250 PS 637: Performed by: STUDENT IN AN ORGANIZED HEALTH CARE EDUCATION/TRAINING PROGRAM

## 2022-01-15 PROCEDURE — 83735 ASSAY OF MAGNESIUM: CPT | Performed by: STUDENT IN AN ORGANIZED HEALTH CARE EDUCATION/TRAINING PROGRAM

## 2022-01-15 PROCEDURE — 82040 ASSAY OF SERUM ALBUMIN: CPT | Performed by: STUDENT IN AN ORGANIZED HEALTH CARE EDUCATION/TRAINING PROGRAM

## 2022-01-15 PROCEDURE — 250N000013 HC RX MED GY IP 250 OP 250 PS 637: Performed by: SURGERY

## 2022-01-15 PROCEDURE — 82330 ASSAY OF CALCIUM: CPT | Performed by: STUDENT IN AN ORGANIZED HEALTH CARE EDUCATION/TRAINING PROGRAM

## 2022-01-15 PROCEDURE — 80053 COMPREHEN METABOLIC PANEL: CPT | Performed by: STUDENT IN AN ORGANIZED HEALTH CARE EDUCATION/TRAINING PROGRAM

## 2022-01-15 PROCEDURE — 94640 AIRWAY INHALATION TREATMENT: CPT

## 2022-01-15 PROCEDURE — 120N000002 HC R&B MED SURG/OB UMMC

## 2022-01-15 PROCEDURE — 250N000009 HC RX 250

## 2022-01-15 RX ORDER — NICOTINE POLACRILEX 4 MG
15-30 LOZENGE BUCCAL
Status: DISCONTINUED | OUTPATIENT
Start: 2022-01-15 | End: 2022-01-17 | Stop reason: HOSPADM

## 2022-01-15 RX ORDER — QUETIAPINE FUMARATE 25 MG/1
25 TABLET, FILM COATED ORAL AT BEDTIME
Status: DISCONTINUED | OUTPATIENT
Start: 2022-01-15 | End: 2022-01-17 | Stop reason: HOSPADM

## 2022-01-15 RX ORDER — DEXTROSE MONOHYDRATE 25 G/50ML
25-50 INJECTION, SOLUTION INTRAVENOUS
Status: DISCONTINUED | OUTPATIENT
Start: 2022-01-15 | End: 2022-01-17 | Stop reason: HOSPADM

## 2022-01-15 RX ORDER — ALBUTEROL SULFATE 0.83 MG/ML
2.5 SOLUTION RESPIRATORY (INHALATION) 3 TIMES DAILY
Status: DISCONTINUED | OUTPATIENT
Start: 2022-01-15 | End: 2022-01-15

## 2022-01-15 RX ORDER — NICOTINE POLACRILEX 4 MG
LOZENGE BUCCAL
Status: COMPLETED
Start: 2022-01-15 | End: 2022-01-15

## 2022-01-15 RX ORDER — POTASSIUM CHLORIDE 29.8 MG/ML
20 INJECTION INTRAVENOUS ONCE
Status: COMPLETED | OUTPATIENT
Start: 2022-01-15 | End: 2022-01-15

## 2022-01-15 RX ADMIN — Medication 15 G: at 13:32

## 2022-01-15 RX ADMIN — QUETIAPINE FUMARATE 25 MG: 25 TABLET ORAL at 22:21

## 2022-01-15 RX ADMIN — Medication 40 MG: at 08:01

## 2022-01-15 RX ADMIN — ALBUTEROL SULFATE 2.5 MG: 2.5 SOLUTION RESPIRATORY (INHALATION) at 14:37

## 2022-01-15 RX ADMIN — DEXTROSE 15 G: 15 GEL ORAL at 13:32

## 2022-01-15 RX ADMIN — ENOXAPARIN SODIUM 40 MG: 40 INJECTION SUBCUTANEOUS at 08:01

## 2022-01-15 RX ADMIN — POTASSIUM CHLORIDE 20 MEQ: 29.8 INJECTION, SOLUTION INTRAVENOUS at 06:29

## 2022-01-15 RX ADMIN — ACETYLCYSTEINE 2 ML: 100 SOLUTION ORAL; RESPIRATORY (INHALATION) at 14:37

## 2022-01-15 RX ADMIN — ENOXAPARIN SODIUM 40 MG: 40 INJECTION SUBCUTANEOUS at 20:42

## 2022-01-15 RX ADMIN — FOLIC ACID 1 MG: 1 TABLET ORAL at 08:01

## 2022-01-15 RX ADMIN — THERA TABS 1 TABLET: TAB at 08:01

## 2022-01-15 RX ADMIN — THIAMINE HCL TAB 100 MG 100 MG: 100 TAB at 08:01

## 2022-01-15 RX ADMIN — CEFTRIAXONE SODIUM 2 G: 2 INJECTION, POWDER, FOR SOLUTION INTRAMUSCULAR; INTRAVENOUS at 10:56

## 2022-01-15 RX ADMIN — DEXAMETHASONE SODIUM PHOSPHATE 6 MG: 4 INJECTION, SOLUTION INTRA-ARTICULAR; INTRALESIONAL; INTRAMUSCULAR; INTRAVENOUS; SOFT TISSUE at 12:25

## 2022-01-15 ASSESSMENT — ACTIVITIES OF DAILY LIVING (ADL)
ADLS_ACUITY_SCORE: 11

## 2022-01-15 NOTE — PLAN OF CARE
ICU End of Shift Summary. See flowsheets for vital signs and detailed assessment.    Changes this shift:  Pt appropriate, alert oriented x4, afebrile, normotensive and in SR. Pt was previously on 30 L 21% FiO2 on HFNC but was stopped to trial oxygenation on room air. Pt successfully switched to RA and satted between %. Pt standby assist w/ gait belt. Ambulated to commode x2 and had 1 small loose, watery BM. Villatoro catheter removed. Labs somewhat unremarkable, K replaced. Albumin 1.7. Patient did not sleep well 1 day post extubation.     Plan: Encourage healthy sleep hygiene. Transition to floor.

## 2022-01-15 NOTE — PROGRESS NOTES
MICU STAFF CRITICAL CARE PROGRESS NOTE:    I saw and examined the patient with the MICU team and concur with findings and A&P as detailed in Dr. Em's note of today    40 yo male with h/o EtOHism who was admitted to MICU on 1/6 with acute resp failure, COVID, Pneumococcus and MRSA bacteremia    Extubated yesterday 1/14 and has done well overnight. Cleared yesterday by Speech therapy    Tmax 100.1  HR ; normotensive  I:O - 1.9 L net  Awake alert, appropriately interactive  Mouth WNL  Breathing well on RA with RR ~ 20  RRR  W/o M/g/r  Abd mildly tender to palpation in central abdomen w/o distention, rebound  Ext scant edema    Hgb 9.3  WBC 9.0  135 3.7 HCO3 27 creat 0.65  BC 1/11 NGTD  Nl Mg, iCa and PO4    Acute Hypoxic Resp Failure / COVID / Pneumococcal Pneumonia  MSSA Bacteremia (last +BC 1/5)  Improved  - On ceftriaxone  Completing dexamethasone  Did not get remdesivir  On Albuterol and mucomyst scheduled nebs    Podiatry - to be consulted. Prob Onchomycosis     (Critical Care 45 minutes cumulative thus far today, 1/15/2022, exclusive of procedures)    Dalton Guadalupe MD  MICU Staff  [phone]

## 2022-01-15 NOTE — PROGRESS NOTES
[facility]    Medicine Progress Note - Hospitalist Service, Gold 6       Date of Admission:  1/6/2022      ASSESSMENT & PLAN:   Brent Lau is a 41 year old male with PMHx of alcohol abuse, tobacco use, and GERD who was admitted to [facility] ICU with acute hypoxic respiratory failure and septic shock secondary to Strep pneumoniae bacteremia, Strep pneumoniae/MSSA pneumonia, and COVID-19. Intubated in ED. Extubated 1/14. Transferred to hospitalist service 1/15/22.     *Acute hypoxic respiratory failure and septic shock d/t Pneumococcal pneumonia and bacteremia  *MSSA pneumonia  *COVID-19 infection:  Presented w/ dyspnea and cough. Hypoxic on arrival, SpO2 70's on RA. Lactate 10.5. Procal 16.40. +COVID PCR. CTA Chest (1/5) showed dense consolidative infiltrates in lower lobes and patchy GGO bilaterally. Intubated 1/6 in ED. Blood culture x2 (1/5) grew Strep pneumo. Sputum culture (1/6) grew Strep pneumo and MSSA. Suspect primary  of sepsis and hypoxia to be pneumococcal infection. Completed remdesivir x 5 days for COVID. MSSA covered with current antibiotics. Clinically improving.  Sepsis resolved. Extubated 1/14. Currently stable on RA. Afebrile. Vitals stable. WBC normal. Peak  and Procal 47.05, currently down-trending. Most recent CXR (1/11) showed improving basilar opacities.   - Continue ceftriaxone 2g IV q24h x 2 weeks (stop date 1/19)  - Complete 10 day course of dexamethasone for COVID  - Discontinue scheduled nebs  - Supplemental O2 PRN to maintain SpO2 >90%    *Alcohol use disorder:  Patient notes depression and grieving as contributing factors of escalating ETOH use over the past year. Reportedly drinking up to 1 L of vodka/day. No history of withdrawal seizures. Managed with precedex while in ICU but unclear if any evidence of clinically significant withdrawal.   - Interested in CD referrals, consult placed  - Monitor clinically    *GERD:   Continue PPI therapy.    *Steroid-induced hyperglycemia:  No prior history of DM. Hyperglycemic following initiation of steroids. Borderline hypoglycemia noted on current regimen.   - Decrease to MSSI  - Hypoglycemia protocol     *Non-severe malnutrition:  Secondary to acute illness and alcohol abuse.   - Nutrition consulted  - Cleared by SLP to have regular diet     Resolved Problems:  *Anion gap metabolic acidosis:  Secondary to lactic acidosis and alcoholic ketosis.  *Hypokalemia:  Replaced per protocol.   *Hypomagnesemia:  Replaced per protocol.  *Hypocalcemia:  Ionized Ca 3.8 on admission. Replaced.   *Hypophosphatemia:  Replaced per protocol.       Diet: Regular Diet Adult    DVT Prophylaxis: Enoxaparin (Lovenox) SQ  Villatoro Catheter: Not present  Central Lines: PRESENT  PICC Triple Lumen 01/05/22 Right Basilic-Site Assessment: L  Code Status: Full Code      Disposition Plan   Expected Discharge:  1/17/22   Anticipated discharge location:  Awaiting care coordination huddle  Delays:     Placement?        The patient's care was discussed with the Attending Physician, Dr. Castillo.    Escobar Kelly PA-C  Hospitalist Service, [address]  Securely message with the Vocera Web Console (learn more here)  Text page via [facility] Paging/Directory    Please see sign in/sign out for up to date coverage information    Clinically Significant Risk Factors Present on Admission                    ______________________________________________________________________    Interval History   No acute events overnight. Extubated 1/14. Currently on room air. Denies any dyspnea at rest. Mild dyspnea w/ activity. Non-productive cough. No fevers or chills. No chest pain. No GI or  complaints. Generalized weakness reported. No focal neurologic complaints.     Data reviewed today: I reviewed all medications, new labs and imaging results over the last 24 hours.     Physical Exam    Vital Signs: Temp: 99  F (37.2  C) Temp src: Oral BP: (Abnormal) 146/100 Pulse: 86   Resp: (Abnormal) 34 SpO2: (Abnormal) 61 % O2 Device: None (Room air) Oxygen Delivery: 30 LPM  Weight: 193 lbs 1.97 oz  General Appearance: Awake. A&O x 3. NAD.   HEENT: No scleral icterus. No conjunctival erythema. MM moist.   Respiratory: Normal effort. Diminished in bases bilaterally. Otherwise clear.  Cardiovascular: RRR. S1/S2. No murmur.   GI: Soft, ND, NT, +BS.   Ext: No pitting edema. No calf tenderness.   Skin: No visible rash.   Neuro: Grossly non-focal. 4/5 strength throughout.      Data   Recent Labs   Lab 01/15/22  0403 01/14/22  0319 01/13/22  0342 01/12/22  0353    139 138 138   POTASSIUM 3.7 3.7 4.0 3.3*   CHLORIDE 100 106 105 104   CO2 27 28 28 28   ANIONGAP 8 5 5 6   BUN 20 20 18 16   CR 0.65* 0.64* 0.61* 0.60*   MARINA 8.8 8.2* 8.1* 8.1*   MAG 2.1 2.1 2.2 1.4*   PHOS 4.9* 4.2 3.1 3.7   PROTTOTAL 7.7 6.7* 6.8 6.3*   ALBUMIN 1.7* 1.4* 1.4* 1.4*   BILITOTAL 0.4 0.2 0.2 0.3   ALKPHOS 101 98 106 114   AST 51* 51* 48* 50*   ALT 62 58 57 61     Recent Labs   Lab 01/15/22  0403 01/14/22  0319 01/13/22  0342 01/12/22  0354   WBC 9.0 10.1 16.5* 17.0*   RBC 2.92* 2.67* 2.85* 3.03*   HGB 9.3* 8.5* 9.2* 9.8*   HCT 28.0* 26.0* 28.0* 29.2*   MCV 96 97 98 96   MCH 31.8 31.8 32.3 32.3   MCHC 33.2 32.7 32.9 33.6   RDW 13.2 13.7 13.5 13.5    295 303 296     No lab results found in last 7 days.  Recent Labs   Lab 01/09/22  1651 01/09/22  0349   .0* 340.0*      No lab results found in last 7 days.  No lab results found in last 7 days.  Recent Labs   Lab 01/15/22  1345 01/15/22  1318 01/15/22  1258 01/15/22  1243 01/15/22  0751 01/15/22  0410 01/15/22  0403 01/15/22  0104 01/14/22  1954 01/14/22  1549 01/14/22  1146 01/14/22  0856   * 88 65* 58* 117* 97 99 94 144* 148* 109* 170*        All labs personally reviewed in Epic.  See A&P for additional results.     Unresulted Labs Ordered in the Past 30 Days of this  Admission     Date and Time Order Name Status Description    1/11/2022  9:23 AM Blood Culture Arm, Left Preliminary     1/11/2022  9:23 AM Blood Culture Arm, Right Preliminary     1/9/2022 10:52 AM Blood Culture Arm, Right Preliminary

## 2022-01-15 NOTE — PLAN OF CARE
ICU End of Shift Summary. See flowsheets for vital signs and detailed assessment.    Changes this shift: Extubated to high flow nasal cannula @ 1155. Now weaned down to 21% HFNC. Vitally stable. Oriented x4 and able to make needs known. Bedside nurse updated wife of changes this shift.     Plan: Continue to monitor and follow POC.       Problem: Adult Inpatient Plan of Care  Goal: Patient-Specific Goal (Individualized)  Outcome: Improving

## 2022-01-15 NOTE — PROGRESS NOTES
MEDICAL ICU PROGRESS NOTE  01/15/2022    Date of Service (when I saw the patient): 01/15/2022    ASSESSMENT: Brent Lau is a 41 year old male with PMH of tobacco use, alcohol use, and GERD who was admitted to the MICU as a transfer from OSH on 1/6/22 due to shock and acute hypoxic respiratory failure found to have strep pneumo PNA and MSSA bacteremia. He has been treated with ceftriaxone since 01/06 with plans for a 14 day course through 01/19. He was extubated 1/14/21 to [facility] and subsequently weaned to room air. He has no current pressor requirements and will be transferred to medicine 1/15/21.     CHANGES and MAJOR THINGS TODAY:   - Transfer to medicine     PLAN:    Neuro:  # Pain and Sedation  Precedex weaned off 1/15/21.  - Seroquel 50 mg BID scheduled.     # Alcohol use disorder  Per wife, patient has been drinking up to 1 L of vodka/day since Jan 28, 2021 after the sudden death of his sister. BAL elevated on OSH ED arrival. No charted or reported history of alcohol withdrawal/seizures; wife thinks there is a significant component of depression and would like him to be connected to a PCP at [facility] for mental health resources +/- psych meds at the time of discharge. Given Phenobarbitol load 1/9/22.   - Folic acid and Multivitamin Daily  - Thiamine per Wernicke protocol (250 mg daily x5 days, then 100 mg daily thereafter)  - Chemical Dependency consult when extubated; team plans to see patient Monday if still hospitalized at that time      Pulmonary:  # Acute Hypoxic Respiratory Failure, improving   # Strep Pneumo PNA  # MSSA bacteremia  # COVID infection  Patient with dense consolidative infiltrates in the lower lobes on CT c/f significant pneumonia found to have strep pneumo on blood and sputum culture as well as antigen. Extubated 1/8 and re intubated, however no documentation was made of indications. Ongoing thick secretions. CXR 1/11 improved . Had continued improvement in respiratory  status. Extubated on 1/14/22 to HFNC; now on RA 1/15/21.   - Supplemental O2 via NC prn  - Albuterol 2.5mg q4h nebulizer  - Mucomyst q4h  - Antibiotics as below, currently Ceftriaxone 2g q24h (14-day course)     Cardiovascular:  # Septic shock, resolved  Likely in the setting of hypovolemia v distributive/vasoplegic v other. Got 2.25 L at OSH and 2L within first few hours of transfer.   - ECHO with EF of 30-35% and moderate diffuse hypokinesis    # Hypertension  - Hydralazine 10mg prn for BP >180/>110     GI/Nutrition:  # Nutrition  - SLP consult 1/14/21 --> full diet with thin liquids      # GERD  - PPI for stress ulcer prophylaxis      # Bowel Regimen   - Senna scheduled  - Miralax PRN     # Mild central abdominal pain  Vague central abdominal pain 1/15/21, afebrile. Last BM 1/15/21 overnight. On bowel regimen.   - Will continue to monitor. Consider KUB if worsening or febrile.      Renal/Electrolytes:  # Metabolic Acidosis, resolved  # Alcoholic Ketosis, resolved  # Lactic Acidosis, resolved  Adequate respiratory compensation. Lactate 10.5 on ED arrival, improved after some fluid resuscitation. Ketones elevated, likely in the setting of acute alcohol intoxication. CK normal.     # Volume management  Goal net negative 0.5-1L daily.  - 40 mg IVP furosemide 1/14/21 w/ good UOP, no indication for repeat lasix 1/15/21     # Hypokalemia  # Hypomagnesemia  # Hypocalcemia   - daily labs; replete PRN     Infectious Disease:  # Sepsis   # Strep Pneumo PNA  # MSSA bacteremia  Patient had positive strep pneumo on BCx and Sputum Cx with MSSA on sputum cx. Legionella, Covid, influenza, Histo, Blasto RSV, and MRSA negative.  Antimicrobials as below. Procalcitonin downtrending.    Antimicrobials:  Ceftriaxone 1/6 - present (final dose 1/19 for total 14-day course)  Zosyn 1/5- D'karena same day  Vancomycin 1/5- D'karena same day  Azithromycin 1/5- D'karena same day    1/5/22 2/2 bcx + Strep pneumo  1/6/22 sputum cx + Strep pneumo, Staph  aureus  1/6/22 bcx + Staph aureus  1/7/22 bcx NGTD  1/8/22 bcx NGTD  1/9/22 bcx NGTD  1/9/22 ucx NGTD  1/9/22 sputum cx NGTD  1/11/22 Sputum culture with 1+ candida dublinensis  1/11/22 Blood Cx x2 with NGTD    # New-onset Fever, resolved  Tmax 102.8 on 1/11 a.m. Already on ceftriaxone for MSSA bacteremia. Respiratory status has not worsened. Abd slightly distended, but stable from previous exams. Have held off on broadening antibiotics. Last febrile to 100.8 at 0000 on 1/12.   - Antibiotics as above  - Continue to follow UA, BCx x2 and sputum Cx.  - Will hold on broadening abx unless fever persists or findings suggestive of untreated infection.     # COVID-19  S/p 1 Pfizer vaccine 11/17/21. COVID infection approx 11/25/21. Negative tests 1/5 and 1/6; found to be positive 1/9/22 in the setting of clinically worsening.  - Dexamethasone 6mg for 10-day course (1/9/22- )  - 5 day Remdesevir course (1/9/22- )  - Lovenox 40mg BID    Hematology:  # Acute Thrombocytopenia, resolved   # Acute Normocytic Anemia, stable  Likely in the setting of some likely alcoholic liver disease and some suppression in the setting of acute infection. No acute signs of bleeding on admission or during stay. HgB today 9.9.  - Lovenox 40mg BID  - daily CBC  - Hb goal > 7      Endocrine:  No history of DM. Increasing BG after initiating Dexamethasone 1/9. BG's in 200-300 rabge, Started low dose sliding scale insulin on 1/10.  - Increased NPH to 15 units daily around time of Decadron administration. Monitor glucose as decadron dose decreases and adjust NPH accordingly.   - High intensity sliding scale       MSK:  No active issues  - PT/OT consulted     Skin:  No active issues    General Cares/Prophylaxis:    DVT Prophylaxis: Lovenox 40mg BID; COVID dosing  GI Prophylaxis: PPI  Restraints: None.   Family Communication: updated wife, Zeynep, on the phone 1/13/22.  Code Status: Full Code    Lines/tubes/drains:  - PICC 1/6  - ETT 1/8, extubated  1/14  - Newberry 1/9-01/14  - OG 1/8-1/14    Disposition:  - Medical ICU    Patient seen and findings/plan discussed with medical ICU staff, Dr. Guadalupe.     Elisabeth Parra MD/MPH  Internal Medicine/Dermatology  PGY-5      MICU STAFF CRITICAL CARE PROGRESS NOTE:    I saw and examined the patient with the MICU team and concur with findings and A&P as detailed in Dr. Em's above note of today    See my independent note of today    Dalton Guadalupe MD  MICU Staff  [phone]    ========================  INTERVAL HISTORY:   No acute events overnight. Patient extubated yesterday and reports feeling overall improved this morning. On room air when examined. Was happy to get some sleep on his own but feels like his seroquel is just kicking in this morning so he is going to take a nap. Has some vague abdominal discomfort, but had a soft/liquid BM overnight. Tolerating food and liquids; SLP consult yesterday.       OBJECTIVE:   1. VITAL SIGNS:   Temp:  [98.1  F (36.7  C)-100.1  F (37.8  C)] 98.7  F (37.1  C)  Pulse:  [] 101  Resp:  [17-65] 38  BP: ()/(47-97) 128/90  FiO2 (%):  [21 %-50 %] 21 %  SpO2:  [92 %-100 %] 100 %    2. INTAKE/ OUTPUT:   I/O last 3 completed shifts:  In: 1055.36 [P.O.:340; I.V.:405.36; NG/GT:130]  Out: 3115 [Urine:3115]    3. PHYSICAL EXAMINATION:  General: supine, comfortably laying in bed  Neuro: Comfortable, laying in bed, following commands appropriately. Alert and Awake.   Pulm/Resp: Slightly increased RR, otherwise breathing comfortably on room air, speaking in full but short sentences. Faint course crackles on the lateral lung fields. No use of accessory muscles.   CV: RRR, extremities WWP  Abdomen: Soft, slight-distention, slightly tender to palpation in mid-abdominal region. No peritoneal signs.   :  No newberry.   Incisions/Skin: no rash or lesions visible  Lymph: no LE edema    4. LABS:   Arterial Blood Gases   Recent Labs   Lab 01/09/22  1740   PH 7.50*   PCO2 34*   PO2 84   HCO3 26      Complete Blood Count   Recent Labs   Lab 01/15/22  0403 01/14/22  0319 01/13/22  0342 01/12/22  0354   WBC 9.0 10.1 16.5* 17.0*   HGB 9.3* 8.5* 9.2* 9.8*    295 303 296     Basic Metabolic Panel  Recent Labs   Lab 01/15/22  0410 01/15/22  0403 01/15/22  0104 01/14/22  1954 01/14/22  0856 01/14/22 0319 01/13/22 0358 01/13/22 0342 01/12/22  0749 01/12/22 0353   NA  --  135  --   --   --  139  --  138  --  138   POTASSIUM  --  3.7  --   --   --  3.7  --  4.0  --  3.3*   CHLORIDE  --  100  --   --   --  106  --  105  --  104   CO2  --  27  --   --   --  28  --  28  --  28   BUN  --  20  --   --   --  20  --  18  --  16   CR  --  0.65*  --   --   --  0.64*  --  0.61*  --  0.60*   GLC 97 99 94 144*   < > 154*   < > 157*   < > 271*    < > = values in this interval not displayed.     Liver Function Tests  Recent Labs   Lab 01/15/22  0403 01/14/22  0319 01/13/22  0342 01/12/22  0353   AST 51* 51* 48* 50*   ALT 62 58 57 61   ALKPHOS 101 98 106 114   BILITOTAL 0.4 0.2 0.2 0.3   ALBUMIN 1.7* 1.4* 1.4* 1.4*     Coagulation Profile  No lab results found in last 7 days.    5. RADIOLOGY:   No results found for this or any previous visit (from the past 24 hour(s)).

## 2022-01-16 ENCOUNTER — APPOINTMENT (OUTPATIENT)
Dept: OCCUPATIONAL THERAPY | Facility: CLINIC | Age: 42
DRG: 870 | End: 2022-01-16
Attending: PHYSICIAN ASSISTANT
Payer: COMMERCIAL

## 2022-01-16 ENCOUNTER — APPOINTMENT (OUTPATIENT)
Dept: PHYSICAL THERAPY | Facility: CLINIC | Age: 42
DRG: 870 | End: 2022-01-16
Attending: SURGERY
Payer: COMMERCIAL

## 2022-01-16 ENCOUNTER — HOME INFUSION (PRE-WILLOW HOME INFUSION) (OUTPATIENT)
Dept: PHARMACY | Facility: CLINIC | Age: 42
End: 2022-01-16

## 2022-01-16 LAB
ALBUMIN SERPL-MCNC: 1.8 G/DL (ref 3.4–5)
ALP SERPL-CCNC: 95 U/L (ref 40–150)
ALT SERPL W P-5'-P-CCNC: 52 U/L (ref 0–70)
ANION GAP SERPL CALCULATED.3IONS-SCNC: 6 MMOL/L (ref 3–14)
AST SERPL W P-5'-P-CCNC: 40 U/L (ref 0–45)
BACTERIA BLD CULT: NO GROWTH
BACTERIA BLD CULT: NO GROWTH
BILIRUB SERPL-MCNC: 0.3 MG/DL (ref 0.2–1.3)
BUN SERPL-MCNC: 17 MG/DL (ref 7–30)
CALCIUM SERPL-MCNC: 8.4 MG/DL (ref 8.5–10.1)
CHLORIDE BLD-SCNC: 103 MMOL/L (ref 94–109)
CO2 SERPL-SCNC: 24 MMOL/L (ref 20–32)
CREAT SERPL-MCNC: 0.65 MG/DL (ref 0.66–1.25)
ERYTHROCYTE [DISTWIDTH] IN BLOOD BY AUTOMATED COUNT: 13.2 % (ref 10–15)
GFR SERPL CREATININE-BSD FRML MDRD: >90 ML/MIN/1.73M2
GLUCOSE BLD-MCNC: 126 MG/DL (ref 70–99)
GLUCOSE BLDC GLUCOMTR-MCNC: 108 MG/DL (ref 70–99)
GLUCOSE BLDC GLUCOMTR-MCNC: 109 MG/DL (ref 70–99)
GLUCOSE BLDC GLUCOMTR-MCNC: 109 MG/DL (ref 70–99)
GLUCOSE BLDC GLUCOMTR-MCNC: 137 MG/DL (ref 70–99)
GLUCOSE BLDC GLUCOMTR-MCNC: 83 MG/DL (ref 70–99)
HCT VFR BLD AUTO: 28 % (ref 40–53)
HGB BLD-MCNC: 9.2 G/DL (ref 13.3–17.7)
MAGNESIUM SERPL-MCNC: 2.2 MG/DL (ref 1.6–2.3)
MCH RBC QN AUTO: 31.9 PG (ref 26.5–33)
MCHC RBC AUTO-ENTMCNC: 32.9 G/DL (ref 31.5–36.5)
MCV RBC AUTO: 97 FL (ref 78–100)
PHOSPHATE SERPL-MCNC: 4.2 MG/DL (ref 2.5–4.5)
PLATELET # BLD AUTO: 347 10E3/UL (ref 150–450)
POTASSIUM BLD-SCNC: 3.4 MMOL/L (ref 3.4–5.3)
POTASSIUM BLD-SCNC: 4.6 MMOL/L (ref 3.4–5.3)
PROT SERPL-MCNC: 7.6 G/DL (ref 6.8–8.8)
RBC # BLD AUTO: 2.88 10E6/UL (ref 4.4–5.9)
SODIUM SERPL-SCNC: 133 MMOL/L (ref 133–144)
WBC # BLD AUTO: 7.3 10E3/UL (ref 4–11)

## 2022-01-16 PROCEDURE — 999N000044 HC STATISTIC CVC DRESSING CHANGE

## 2022-01-16 PROCEDURE — 97110 THERAPEUTIC EXERCISES: CPT | Mod: GP

## 2022-01-16 PROCEDURE — 97530 THERAPEUTIC ACTIVITIES: CPT | Mod: GP

## 2022-01-16 PROCEDURE — 99207 PR APP CREDIT; MD BILLING SHARED VISIT: CPT | Performed by: PHYSICIAN ASSISTANT

## 2022-01-16 PROCEDURE — 99232 SBSQ HOSP IP/OBS MODERATE 35: CPT | Performed by: STUDENT IN AN ORGANIZED HEALTH CARE EDUCATION/TRAINING PROGRAM

## 2022-01-16 PROCEDURE — 36592 COLLECT BLOOD FROM PICC: CPT | Performed by: PHYSICIAN ASSISTANT

## 2022-01-16 PROCEDURE — 36592 COLLECT BLOOD FROM PICC: CPT | Performed by: STUDENT IN AN ORGANIZED HEALTH CARE EDUCATION/TRAINING PROGRAM

## 2022-01-16 PROCEDURE — 250N000013 HC RX MED GY IP 250 OP 250 PS 637: Performed by: SURGERY

## 2022-01-16 PROCEDURE — 97116 GAIT TRAINING THERAPY: CPT | Mod: GP

## 2022-01-16 PROCEDURE — 97165 OT EVAL LOW COMPLEX 30 MIN: CPT | Mod: GO

## 2022-01-16 PROCEDURE — 250N000013 HC RX MED GY IP 250 OP 250 PS 637: Performed by: PHYSICIAN ASSISTANT

## 2022-01-16 PROCEDURE — 97530 THERAPEUTIC ACTIVITIES: CPT | Mod: GO

## 2022-01-16 PROCEDURE — 80053 COMPREHEN METABOLIC PANEL: CPT | Performed by: STUDENT IN AN ORGANIZED HEALTH CARE EDUCATION/TRAINING PROGRAM

## 2022-01-16 PROCEDURE — 250N000013 HC RX MED GY IP 250 OP 250 PS 637: Performed by: STUDENT IN AN ORGANIZED HEALTH CARE EDUCATION/TRAINING PROGRAM

## 2022-01-16 PROCEDURE — 120N000002 HC R&B MED SURG/OB UMMC

## 2022-01-16 PROCEDURE — 250N000013 HC RX MED GY IP 250 OP 250 PS 637

## 2022-01-16 PROCEDURE — 84132 ASSAY OF SERUM POTASSIUM: CPT | Performed by: PHYSICIAN ASSISTANT

## 2022-01-16 PROCEDURE — 84100 ASSAY OF PHOSPHORUS: CPT | Performed by: STUDENT IN AN ORGANIZED HEALTH CARE EDUCATION/TRAINING PROGRAM

## 2022-01-16 PROCEDURE — 250N000011 HC RX IP 250 OP 636: Performed by: STUDENT IN AN ORGANIZED HEALTH CARE EDUCATION/TRAINING PROGRAM

## 2022-01-16 PROCEDURE — 82040 ASSAY OF SERUM ALBUMIN: CPT | Performed by: STUDENT IN AN ORGANIZED HEALTH CARE EDUCATION/TRAINING PROGRAM

## 2022-01-16 PROCEDURE — 83735 ASSAY OF MAGNESIUM: CPT | Performed by: STUDENT IN AN ORGANIZED HEALTH CARE EDUCATION/TRAINING PROGRAM

## 2022-01-16 PROCEDURE — 85027 COMPLETE CBC AUTOMATED: CPT | Performed by: STUDENT IN AN ORGANIZED HEALTH CARE EDUCATION/TRAINING PROGRAM

## 2022-01-16 RX ORDER — CEFTRIAXONE 2 G/1
2 INJECTION, POWDER, FOR SOLUTION INTRAMUSCULAR; INTRAVENOUS EVERY 24 HOURS
Qty: 40 ML | Refills: 0 | Status: SHIPPED | OUTPATIENT
Start: 2022-01-17 | End: 2022-01-17

## 2022-01-16 RX ORDER — POTASSIUM CHLORIDE 750 MG/1
40 TABLET, EXTENDED RELEASE ORAL ONCE
Status: COMPLETED | OUTPATIENT
Start: 2022-01-16 | End: 2022-01-16

## 2022-01-16 RX ORDER — PANTOPRAZOLE SODIUM 40 MG/1
40 TABLET, DELAYED RELEASE ORAL
Status: DISCONTINUED | OUTPATIENT
Start: 2022-01-16 | End: 2022-01-17 | Stop reason: HOSPADM

## 2022-01-16 RX ADMIN — ACETAMINOPHEN 650 MG: 325 TABLET, FILM COATED ORAL at 01:04

## 2022-01-16 RX ADMIN — MENTHOL 1 PATCH: 205.5 PATCH TOPICAL at 18:29

## 2022-01-16 RX ADMIN — THERA TABS 1 TABLET: TAB at 08:33

## 2022-01-16 RX ADMIN — FOLIC ACID 1 MG: 1 TABLET ORAL at 08:33

## 2022-01-16 RX ADMIN — PANTOPRAZOLE SODIUM 40 MG: 40 TABLET, DELAYED RELEASE ORAL at 08:33

## 2022-01-16 RX ADMIN — POTASSIUM CHLORIDE 40 MEQ: 750 TABLET, EXTENDED RELEASE ORAL at 11:00

## 2022-01-16 RX ADMIN — ENOXAPARIN SODIUM 40 MG: 40 INJECTION SUBCUTANEOUS at 08:33

## 2022-01-16 RX ADMIN — QUETIAPINE FUMARATE 25 MG: 25 TABLET ORAL at 21:02

## 2022-01-16 RX ADMIN — CEFTRIAXONE SODIUM 2 G: 2 INJECTION, POWDER, FOR SOLUTION INTRAMUSCULAR; INTRAVENOUS at 11:00

## 2022-01-16 RX ADMIN — ENOXAPARIN SODIUM 40 MG: 40 INJECTION SUBCUTANEOUS at 21:02

## 2022-01-16 RX ADMIN — DEXAMETHASONE SODIUM PHOSPHATE 6 MG: 4 INJECTION, SOLUTION INTRA-ARTICULAR; INTRALESIONAL; INTRAMUSCULAR; INTRAVENOUS; SOFT TISSUE at 11:02

## 2022-01-16 RX ADMIN — ACETAMINOPHEN 650 MG: 325 TABLET, FILM COATED ORAL at 08:33

## 2022-01-16 RX ADMIN — THIAMINE HCL TAB 100 MG 100 MG: 100 TAB at 08:33

## 2022-01-16 RX ADMIN — ACETAMINOPHEN 650 MG: 325 TABLET, FILM COATED ORAL at 18:10

## 2022-01-16 ASSESSMENT — ACTIVITIES OF DAILY LIVING (ADL)
ADLS_ACUITY_SCORE: 9
ADLS_ACUITY_SCORE: 11
ADLS_ACUITY_SCORE: 9
ADLS_ACUITY_SCORE: 11
ADLS_ACUITY_SCORE: 10
ADLS_ACUITY_SCORE: 11
ADLS_ACUITY_SCORE: 10
ADLS_ACUITY_SCORE: 13
ADLS_ACUITY_SCORE: 9
ADLS_ACUITY_SCORE: 10
ADLS_ACUITY_SCORE: 9
ADLS_ACUITY_SCORE: 9
ADLS_ACUITY_SCORE: 11
ADLS_ACUITY_SCORE: 9
ADLS_ACUITY_SCORE: 9
ADLS_ACUITY_SCORE: 11
ADLS_ACUITY_SCORE: 9
ADLS_ACUITY_SCORE: 10
ADLS_ACUITY_SCORE: 9
ADLS_ACUITY_SCORE: 11

## 2022-01-16 ASSESSMENT — MIFFLIN-ST. JEOR: SCORE: 1653.25

## 2022-01-16 NOTE — PLAN OF CARE
Neuro: A&Ox3  Cardiac: HR 70-80s  Respiratory: Lungs clear ,diminished in bases.   GI/: Tolerating regular diet. Voiding spontaneously.   Diet/Appetite: Regular diet  Activity: Up with walker and SBA. Generalized weakness.  Pain: Denies pain  LDA's: PIV x1,TL PICC Right arm  Plan: Possibly home tomorrow with IV antibiotics. Continue with POC.

## 2022-01-16 NOTE — PROGRESS NOTES
Transferred to:  room 13 at 12:15am on 01/16/22  Status at time of transfer: Stable, afebrile, normotensive. On room air. Standby assist. Transported via wheelchair  Belongings: with patient (boots, jacket, various clothes)  Villatroo removed? (if no, why?): Removed 1/15  Chart and medications: with patient   Family notified: yes, wife kristen

## 2022-01-16 NOTE — PROGRESS NOTES
"Pt transferred to 5A at the start of night shift from  via wheelchair with his belongings.Report called from Zhen WANG and he is in Covid positive and on RA./79 (BP Location: Left arm)   Pulse 82   Temp 99.5  F (37.5  C) (Oral)   Resp 20   Ht 1.778 m (5' 10\")   Wt 87.6 kg (193 lb 2 oz)   SpO2 95%   BMI 27.71 kg/m   RA.Denies any pain or nausea.Will continue to monitor.  "

## 2022-01-16 NOTE — PLAN OF CARE
"/83 (BP Location: Left arm)   Pulse 82   Temp 99.6  F (37.6  C) (Oral)   Resp 18   Ht 1.778 m (5' 10\")   Wt 87.6 kg (193 lb 2 oz)   SpO2 95%   BMI 27.71 kg/m  RA  Shift: 5790-2455  Neuro: A&Ox3  Cardiac: HR 70-80s  Respiratory: Lungs clear ,diminished in bases.Frequent productive cough,thick clear secretions  GI/: No BM during the night.Denies nausea.Urinal at bedside small amounts.  Diet/Appetite: Regular diet  Activity: Up with assist of one.  Pain: denies pain  LDA's: PIV x1,TL PICC Right arm  Plan: Encourage stool softners and good po and eating.Will continue to monitor.  "

## 2022-01-16 NOTE — PLAN OF CARE
Problem: Adult Inpatient Plan of Care  Goal: Plan of Care Review  Outcome: Improving  Goal: Patient-Specific Goal (Individualized)  Outcome: Improving  Goal: Absence of Hospital-Acquired Illness or Injury  Outcome: Improving  Intervention: Identify and Manage Fall Risk  Recent Flowsheet Documentation  Taken 1/15/2022 1200 by Cassidy Coley RN  Safety Promotion/Fall Prevention:   activity supervised   lighting adjusted   clutter free environment maintained   increased rounding and observation   increase visualization of patient   nonskid shoes/slippers when out of bed   room near nurse's station   room organization consistent   supervised activity   treat reversible contributory factors   treat underlying cause  Taken 1/15/2022 0800 by Cassidy Coley RN  Safety Promotion/Fall Prevention:   activity supervised   lighting adjusted   clutter free environment maintained   increased rounding and observation   increase visualization of patient   nonskid shoes/slippers when out of bed   room near nurse's station   room organization consistent   supervised activity   treat reversible contributory factors   treat underlying cause  Intervention: Prevent Skin Injury  Recent Flowsheet Documentation  Taken 1/15/2022 1600 by Cassidy Coley RN  Body Position: position changed independently  Taken 1/15/2022 1200 by Cassidy Coley RN  Body Position: position changed independently  Taken 1/15/2022 0800 by Cassidy Coley RN  Body Position: position changed independently  Taken 1/15/2022 0700 by Cassidy Coley RN  Body Position: position changed independently  Intervention: Prevent and Manage VTE (Venous Thromboembolism) Risk  Recent Flowsheet Documentation  Taken 1/15/2022 1200 by Cassidy Coley RN  VTE Prevention/Management:   anticoagulant therapy maintained   bleeding precautions maintained   bleeding risk assessed   AROM (active range of motion) performed  Taken  1/15/2022 0800 by Cassidy Coley RN  VTE Prevention/Management:   anticoagulant therapy maintained   bleeding precautions maintained   bleeding risk assessed   AROM (active range of motion) performed  Intervention: Prevent Infection  Recent Flowsheet Documentation  Taken 1/15/2022 1200 by Cassidy Coley RN  Infection Prevention:   rest/sleep promoted   single patient room provided  Taken 1/15/2022 0800 by Cassidy Coley RN  Infection Prevention:   rest/sleep promoted   single patient room provided  Goal: Optimal Comfort and Wellbeing  Outcome: Improving  Goal: Readiness for Transition of Care  Outcome: Improving     Problem: Risk for Delirium  Goal: Optimal Coping  Outcome: Improving  Goal: Improved Behavioral Control  Outcome: Improving  Goal: Improved Attention and Thought Clarity  Outcome: Improving  Goal: Improved Sleep  Outcome: Improving     Problem: Restraint for Non-Violent/Non-Self-Destructive Behavior  Goal: Prevent/Manage Potential Problems  Description: Maintain safety of patient and others during period of restraint.  Promote psychological and physical wellbeing.  Prevent injury to skin and involved body parts.  Promote nutrition, hydration, and elimination.  Outcome: Improving     Problem: Alcohol Withdrawal  Goal: Alcohol Withdrawal Symptom Control  Outcome: Improving     Problem: Adjustment to Illness (Sepsis/Septic Shock)  Goal: Optimal Coping  Outcome: Improving     Problem: Bleeding (Sepsis/Septic Shock)  Goal: Absence of Bleeding  Outcome: Improving     Problem: Glycemic Control Impaired (Sepsis/Septic Shock)  Goal: Blood Glucose Level Within Desired Range  Outcome: Improving     Problem: Infection Progression (Sepsis)  Goal: Absence of Infection Signs and Symptoms  Outcome: Improving  Intervention: Initiate Sepsis Management  Recent Flowsheet Documentation  Taken 1/15/2022 1200 by Cassidy Coley RN  Infection Prevention:   rest/sleep promoted   single patient  room provided  Isolation Precautions: (covid special precautions)   other (see comments)   airborne precautions maintained   contact precautions maintained   droplet precautions maintained  Taken 1/15/2022 0800 by Cassidy Coley RN  Infection Prevention:   rest/sleep promoted   single patient room provided  Isolation Precautions: (covid special precautions)   other (see comments)   airborne precautions maintained   contact precautions maintained   droplet precautions maintained  Intervention: Promote Recovery  Recent Flowsheet Documentation  Taken 1/15/2022 1800 by Cassidy Coley RN  Activity Management: activity adjusted per tolerance  Taken 1/15/2022 1700 by Cassidy Coley RN  Activity Management: (stood and pivoted to chair and bed)   activity adjusted per tolerance   up in chair  Taken 1/15/2022 1600 by Cassidy Coley RN  Activity Management:   activity adjusted per tolerance   activity encouraged   activity minimized   ambulated in room   up in chair   standing at bedside  Taken 1/15/2022 1200 by Cassidy Coley RN  Activity Management:   activity adjusted per tolerance   activity encouraged   activity minimized   ambulated in room   up in chair   standing at bedside  Taken 1/15/2022 0800 by Cassidy Coley RN  Activity Management:   activity adjusted per tolerance   activity encouraged   activity minimized   ambulated in room   up in chair   standing at bedside  Taken 1/15/2022 0700 by Cassidy Coley RN  Activity Management: activity adjusted per tolerance     Problem: Nutrition Impaired (Sepsis/Septic Shock)  Goal: Optimal Nutrition Intake  Outcome: Improving   ICU End of Shift Summary. See flowsheets for vital signs and detailed assessment.    Changes this shift: Patient transferred to [address] today.  He continues on room air, respirating comfortably.  Hemodynamically stable. Pt. Up to the chair today assist of 1. Pt. Unsteady on feet;  initially light headed when he sat at the bedside but resolved with a few deep breaths and time.   Pt. Denies pain.  Pt. Ordering a lot of food but eating less than 10% of his trays.  He was hypoglycemic today requiring 1 popsicle, 2 apple juices, and glucose gel to get him above a BG of 100. NPH discontinued.  Sliding scale only. Pt. -46 for the day.      Plan:  Transfer to the floor when there is a room available.

## 2022-01-16 NOTE — CONSULTS
Care Management Discharge Note    Discharge Date:    Expected Time of Departure: 1700    Discharge Disposition: Home,Home Infusion    Discharge Services: Other (see comment) (Home Infusion)    Discharge DME: None    Discharge Transportation: family or friend will provide    Private pay costs discussed: insurance costs out of pocket expenses and insurance benefit checks      Education Provided on the Discharge Plan:  Home infusion, benefits checks  Persons Notified of Discharge Plans: Patient, wife, care team  Patient/Family in Agreement with the Plan: yes    Handoff Referral Completed: Yes    Additional Information:  Patient is agreeable to going home pre benefits check and start IV antibiotics with [facility] and have benefits checked on Tuesday. Patient and wife verbally agreed and RNCC coordinated with [facility] to set up care. Patient does have MA and educated patient and wife that they can call MA number on card for coverage information and they were able to verbalize understanding. HI agreeable to start services in home on 17th.     Additional services in chart; patient to call and set up Mental Health and Addiction Services for evaluation: Mental Health and Addiction Services Line: [phone] and make an appt through  [facility]  for an evaluation.     Addendum: Patient was seen and not cleared by PT/OT  per Gold 6. Will need recs for therapy and new discharge plan.     JESENIA Velazco., BSN., RN  Nurse Care Coordinator    [facility]  Care Management  [address] [place] [id number]  [email or web address]  [email or web address]  Employed by [employer]    To get in touch with the Weekend & Holiday on call RN Care Coordinator:  Pager:  [phone] OR Care Coordinator job code/pager [phone]

## 2022-01-16 NOTE — PROGRESS NOTES
[facility]    Medicine Progress Note - Hospitalist Service, Gold 6       Date of Admission:  1/6/2022      ASSESSMENT & PLAN:   Brent Lau is a 41 year old male with PMHx of alcohol abuse, tobacco use, and GERD who was admitted to [facility] ICU with acute hypoxic respiratory failure and septic shock secondary to Strep pneumoniae bacteremia, Strep pneumoniae/MSSA pneumonia, and COVID-19. Intubated in ED. Extubated 1/14. Transferred to hospitalist service 1/15/22.     *Acute hypoxic respiratory failure and septic shock d/t Pneumococcal pneumonia and bacteremia  *MSSA pneumonia  *COVID-19 infection:  Presented w/ dyspnea and cough. Hypoxic on arrival, SpO2 70's on RA. Lactate 10.5. Procal 16.40. +COVID PCR. CTA Chest (1/5) showed dense consolidative infiltrates in lower lobes and patchy GGO bilaterally. Intubated 1/6 in ED. Blood culture x2 (1/5) grew Strep pneumo. Sputum culture (1/6) grew Strep pneumo and MSSA. Suspect primary  of sepsis and hypoxia to be pneumococcal infection. Completed remdesivir x 5 days for COVID. On dexamethasone. Extubated 1/14. Remains stable on RA. Afebrile. Vitals stable. WBC normal. CRP and Procal down-trending. Most recent CXR (1/11) showed improving basilar opacities.   - Continue ceftriaxone 2g IV q24h x 2 weeks (stop date 1/19)  - Complete 10 day course of dexamethasone for COVID  - Discontinue scheduled nebs  - Encourage IS  - Supplemental O2 PRN to maintain SpO2 >90%    *Alcohol use disorder:  Patient notes depression and grieving as contributing factors of escalating ETOH use over the past year. Drinking up to 1 L of vodka/day. No history of withdrawal seizures. Managed with precedex while in ICU. Currently outside window of withdrawal.   - Interested in CD referrals, consult placed  - Monitor clinically    *GERD:  Continue PPI therapy.    *Steroid-induced hyperglycemia:  No prior history of DM. Hyperglycemic following  initiation of steroids. Currently well controlled with borderline hypoglycemia intermittently.  - Stop insulin  - Monitor sugars QID for now  - Hypoglycemia protocol     *Non-severe malnutrition:  Secondary to acute illness and alcohol abuse. PO intake has improved.   - Nutrition consulted  - Cleared by SLP to have regular diet     *Physical deconditioning:  Secondary to acute illness. Unable to ambulate unassisted at this time.  - Continue PT/OT  - May need placement if not improving  - Consider PM&R consult for ARU    Resolved Problems:  *Anion gap metabolic acidosis:  Secondary to lactic acidosis and alcoholic ketosis.  *Hypokalemia:  Replaced per protocol.   *Hypomagnesemia:  Replaced per protocol.  *Hypocalcemia:  Ionized Ca 3.8 on admission. Replaced.   *Hypophosphatemia:  Replaced per protocol.       Diet: Regular Diet Adult  Diet    DVT Prophylaxis: Enoxaparin (Lovenox) SQ  Villatoro Catheter: Not present  Central Lines: PRESENT  PICC Triple Lumen 01/05/22 Right Basilic-Site Assessment: WDL  Code Status: Full Code      Disposition Plan   Expected Discharge:  1/17/22   Anticipated discharge location: home with family    Delays:     Placement?        The patient's care was discussed with the Attending Physician, Dr. Castillo.    Escobar Kelly PA-C  Hospitalist Service, [address]  Securely message with the Vocera Web Console (learn more here)  Text page via [employer] Paging/Directory    Please see sign in/sign out for up to date coverage information    Clinically Significant Risk Factors Present on Admission                    ______________________________________________________________________    Interval History   No acute events overnight. Feeling well. Lower extremity weakness noted. Still mild to moderate dyspnea with activity. No dyspnea at rest. Cough improved.     ROS:  Pulm, CV, GI and  negative unless otherwise noted above.     Data reviewed today:  I reviewed all medications, new labs and imaging results over the last 24 hours.     Physical Exam   Vital Signs: Temp: 99.6  F (37.6  C) Temp src: Oral BP: 130/83 Pulse: 82   Resp: 18 SpO2: 95 % O2 Device: None (Room air)    Weight: 163 lbs 9.3 oz  General Appearance: Awake. A&O x 3. NAD.   HEENT: No scleral icterus. No conjunctival erythema. MM moist.   Respiratory: Normal effort. Diminished in bases bilaterally. Otherwise clear. No wheezing or rhonchi.  Cardiovascular: RRR. S1/S2. No murmur.   GI: Soft, ND, NT, +BS.   Ext: No pitting edema. No calf tenderness.   Skin: No visible rash.   Neuro: Grossly non-focal. 4/5 strength throughout.      Data   Recent Labs   Lab 01/16/22 0748 01/15/22  0403 01/14/22  0319 01/13/22  0342    135 139 138   POTASSIUM 3.4 3.7 3.7 4.0   CHLORIDE 103 100 106 105   CO2 24 27 28 28   ANIONGAP 6 8 5 5   BUN 17 20 20 18   CR 0.65* 0.65* 0.64* 0.61*   MARINA 8.4* 8.8 8.2* 8.1*   MAG 2.2 2.1 2.1 2.2   PHOS 4.2 4.9* 4.2 3.1   PROTTOTAL 7.6 7.7 6.7* 6.8   ALBUMIN 1.8* 1.7* 1.4* 1.4*   BILITOTAL 0.3 0.4 0.2 0.2   ALKPHOS 95 101 98 106   AST 40 51* 51* 48*   ALT 52 62 58 57     Recent Labs   Lab 01/16/22  0748 01/15/22  0403 01/14/22 0319 01/13/22  0342   WBC 7.3 9.0 10.1 16.5*   RBC 2.88* 2.92* 2.67* 2.85*   HGB 9.2* 9.3* 8.5* 9.2*   HCT 28.0* 28.0* 26.0* 28.0*   MCV 97 96 97 98   MCH 31.9 31.8 31.8 32.3   MCHC 32.9 33.2 32.7 32.9   RDW 13.2 13.2 13.7 13.5    339 295 303     No lab results found in last 7 days.  Recent Labs   Lab 01/09/22  1651   .0*      No lab results found in last 7 days.  No lab results found in last 7 days.  Recent Labs   Lab 01/16/22  0827 01/16/22  0748 01/16/22  0211 01/15/22  2040 01/15/22  1616 01/15/22  1345 01/15/22  1318 01/15/22  1310 01/15/22  1258 01/15/22  1243 01/15/22  0751 01/15/22  0410   * 126* 109* 88 189* 100* 88 83 65* 58* 117* 97        All labs personally reviewed in [facility].  See A&P for additional results.     Unresulted  Labs Ordered in the Past 30 Days of this Admission     No orders found from 12/7/2021 to 1/7/2022.

## 2022-01-17 ENCOUNTER — APPOINTMENT (OUTPATIENT)
Dept: OCCUPATIONAL THERAPY | Facility: CLINIC | Age: 42
DRG: 870 | End: 2022-01-17
Attending: SURGERY
Payer: COMMERCIAL

## 2022-01-17 ENCOUNTER — APPOINTMENT (OUTPATIENT)
Dept: SPEECH THERAPY | Facility: CLINIC | Age: 42
DRG: 870 | End: 2022-01-17
Attending: SURGERY
Payer: COMMERCIAL

## 2022-01-17 ENCOUNTER — APPOINTMENT (OUTPATIENT)
Dept: PHYSICAL THERAPY | Facility: CLINIC | Age: 42
DRG: 870 | End: 2022-01-17
Attending: SURGERY
Payer: COMMERCIAL

## 2022-01-17 VITALS
WEIGHT: 163.58 LBS | OXYGEN SATURATION: 98 % | DIASTOLIC BLOOD PRESSURE: 66 MMHG | SYSTOLIC BLOOD PRESSURE: 105 MMHG | HEART RATE: 76 BPM | BODY MASS INDEX: 23.42 KG/M2 | RESPIRATION RATE: 18 BRPM | TEMPERATURE: 97.6 F | HEIGHT: 70 IN

## 2022-01-17 LAB
ALBUMIN SERPL-MCNC: 1.9 G/DL (ref 3.4–5)
ALP SERPL-CCNC: 92 U/L (ref 40–150)
ALT SERPL W P-5'-P-CCNC: 43 U/L (ref 0–70)
ANION GAP SERPL CALCULATED.3IONS-SCNC: 10 MMOL/L (ref 3–14)
AST SERPL W P-5'-P-CCNC: 33 U/L (ref 0–45)
BILIRUB SERPL-MCNC: 0.4 MG/DL (ref 0.2–1.3)
BUN SERPL-MCNC: 17 MG/DL (ref 7–30)
CALCIUM SERPL-MCNC: 8.5 MG/DL (ref 8.5–10.1)
CHLORIDE BLD-SCNC: 103 MMOL/L (ref 94–109)
CO2 SERPL-SCNC: 21 MMOL/L (ref 20–32)
CREAT SERPL-MCNC: 0.73 MG/DL (ref 0.66–1.25)
ERYTHROCYTE [DISTWIDTH] IN BLOOD BY AUTOMATED COUNT: 13.2 % (ref 10–15)
GFR SERPL CREATININE-BSD FRML MDRD: >90 ML/MIN/1.73M2
GLUCOSE BLD-MCNC: 114 MG/DL (ref 70–99)
GLUCOSE BLDC GLUCOMTR-MCNC: 95 MG/DL (ref 70–99)
HCT VFR BLD AUTO: 28.1 % (ref 40–53)
HGB BLD-MCNC: 9.1 G/DL (ref 13.3–17.7)
MAGNESIUM SERPL-MCNC: 2.2 MG/DL (ref 1.6–2.3)
MCH RBC QN AUTO: 32.2 PG (ref 26.5–33)
MCHC RBC AUTO-ENTMCNC: 32.4 G/DL (ref 31.5–36.5)
MCV RBC AUTO: 99 FL (ref 78–100)
PHOSPHATE SERPL-MCNC: 4 MG/DL (ref 2.5–4.5)
PLATELET # BLD AUTO: 341 10E3/UL (ref 150–450)
POTASSIUM BLD-SCNC: 3.7 MMOL/L (ref 3.4–5.3)
PROT SERPL-MCNC: 7.8 G/DL (ref 6.8–8.8)
RBC # BLD AUTO: 2.83 10E6/UL (ref 4.4–5.9)
SODIUM SERPL-SCNC: 134 MMOL/L (ref 133–144)
WBC # BLD AUTO: 8.4 10E3/UL (ref 4–11)

## 2022-01-17 PROCEDURE — 85027 COMPLETE CBC AUTOMATED: CPT | Performed by: STUDENT IN AN ORGANIZED HEALTH CARE EDUCATION/TRAINING PROGRAM

## 2022-01-17 PROCEDURE — 84100 ASSAY OF PHOSPHORUS: CPT | Performed by: STUDENT IN AN ORGANIZED HEALTH CARE EDUCATION/TRAINING PROGRAM

## 2022-01-17 PROCEDURE — 97116 GAIT TRAINING THERAPY: CPT | Mod: GP | Performed by: PHYSICAL THERAPIST

## 2022-01-17 PROCEDURE — 83735 ASSAY OF MAGNESIUM: CPT | Performed by: STUDENT IN AN ORGANIZED HEALTH CARE EDUCATION/TRAINING PROGRAM

## 2022-01-17 PROCEDURE — 250N000013 HC RX MED GY IP 250 OP 250 PS 637: Performed by: STUDENT IN AN ORGANIZED HEALTH CARE EDUCATION/TRAINING PROGRAM

## 2022-01-17 PROCEDURE — 97535 SELF CARE MNGMENT TRAINING: CPT | Mod: GO

## 2022-01-17 PROCEDURE — 92526 ORAL FUNCTION THERAPY: CPT | Mod: GN

## 2022-01-17 PROCEDURE — 97110 THERAPEUTIC EXERCISES: CPT | Mod: GO

## 2022-01-17 PROCEDURE — 97530 THERAPEUTIC ACTIVITIES: CPT | Mod: GO

## 2022-01-17 PROCEDURE — 250N000011 HC RX IP 250 OP 636: Performed by: STUDENT IN AN ORGANIZED HEALTH CARE EDUCATION/TRAINING PROGRAM

## 2022-01-17 PROCEDURE — 99239 HOSP IP/OBS DSCHRG MGMT >30: CPT | Performed by: STUDENT IN AN ORGANIZED HEALTH CARE EDUCATION/TRAINING PROGRAM

## 2022-01-17 PROCEDURE — 250N000013 HC RX MED GY IP 250 OP 250 PS 637

## 2022-01-17 PROCEDURE — 80053 COMPREHEN METABOLIC PANEL: CPT | Performed by: STUDENT IN AN ORGANIZED HEALTH CARE EDUCATION/TRAINING PROGRAM

## 2022-01-17 PROCEDURE — 250N000013 HC RX MED GY IP 250 OP 250 PS 637: Performed by: SURGERY

## 2022-01-17 PROCEDURE — 999N000216 HC STATISTIC ADULT CD FACE TO FACE-NO CHRG

## 2022-01-17 PROCEDURE — 99207 PR APP CREDIT; MD BILLING SHARED VISIT: CPT | Performed by: PHYSICIAN ASSISTANT

## 2022-01-17 PROCEDURE — 36592 COLLECT BLOOD FROM PICC: CPT | Performed by: STUDENT IN AN ORGANIZED HEALTH CARE EDUCATION/TRAINING PROGRAM

## 2022-01-17 RX ORDER — AMOXICILLIN 875 MG
875 TABLET ORAL 2 TIMES DAILY
Qty: 4 TABLET | Refills: 0 | Status: SHIPPED | OUTPATIENT
Start: 2022-01-18 | End: 2022-03-24

## 2022-01-17 RX ORDER — LANOLIN ALCOHOL/MO/W.PET/CERES
100 CREAM (GRAM) TOPICAL DAILY
Qty: 60 TABLET | Refills: 0 | Status: SHIPPED | OUTPATIENT
Start: 2022-01-18 | End: 2022-03-24

## 2022-01-17 RX ADMIN — CEFTRIAXONE SODIUM 2 G: 2 INJECTION, POWDER, FOR SOLUTION INTRAMUSCULAR; INTRAVENOUS at 10:12

## 2022-01-17 RX ADMIN — FOLIC ACID 1 MG: 1 TABLET ORAL at 09:16

## 2022-01-17 RX ADMIN — THIAMINE HCL TAB 100 MG 100 MG: 100 TAB at 09:16

## 2022-01-17 RX ADMIN — THERA TABS 1 TABLET: TAB at 09:16

## 2022-01-17 RX ADMIN — PANTOPRAZOLE SODIUM 40 MG: 40 TABLET, DELAYED RELEASE ORAL at 09:16

## 2022-01-17 RX ADMIN — ENOXAPARIN SODIUM 40 MG: 40 INJECTION SUBCUTANEOUS at 09:16

## 2022-01-17 ASSESSMENT — ACTIVITIES OF DAILY LIVING (ADL)
ADLS_ACUITY_SCORE: 9
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
## 2022-01-17 NOTE — PLAN OF CARE
Time: 0700 - 1400    VSS on RA, afebrile. A&Ox4, able to make needs known. Regular diet, tolerating well. Voiding WDL, no BM this shift, denies nausea. Denies pain. Pt discharged home at 1400; L PIV and R PICC pulled, AVS and Xarelto education reviewed, belongings gathered and sent with pt, along with walker provided by PT. Pt to  meds at [facility] in [facility].

## 2022-01-17 NOTE — PLAN OF CARE
Time: 0208-1350    Reason for admit: Respiratory failure (H) [J96.90]  Vitals: WNL.  Maintaining sats on room air.   Activity: Up with SBA as tolerated.   Neuro: A&O x 4.  Hand grasps equal and strong.  Speech is clear and able to follow directions.   Mood/behavior: Pleasant and cooperative.  Lines/drains: R PICC saline locked.  L PIV saline locked.    Cardiac: WNL.  Apical pulse regular.   Respiratory: WNL.  Lung sounds diminished. No cough or SOB.   Diet: regular  GI/: Voids spontaneously and without difficulty.  Bowel sounds active x 4.  No c/o constipation. No nausea/vomiting.   Skin: WNL  Pain: denies.  Slept well.     Plan: Possible discharge home today.

## 2022-01-17 NOTE — CONSULTS
1/17/2022    CD consult acknowledged and closing.   Spoke with EZIO Carrasco (addiction med). Pt has resources needed for [facility] services at this time.   Pt is able to seek outpatient services as he returns home.     [email or web address]  Direct phone: [phone]

## 2022-01-17 NOTE — PLAN OF CARE
Speech Language Therapy Discharge Summary    Reason for therapy discharge:    All goals and outcomes met, no further needs identified.    Progress towards therapy goal(s). See goals on Care Plan in [facility] electronic health record for goal details.  Goals met    Therapy recommendation(s):    No further therapy is recommended.    Recommend regular diet (7) and thin liquids (0) - upright position, slow rate, small bites/sips, alternate solids and liquids.

## 2022-01-17 NOTE — PROGRESS NOTES
Care Management Discharge Note    Discharge Date: 01/17/2022  Expected Time of Departure: 1700    Discharge Disposition: Home w/spouse  Per Dr Santamaria patient does not need TCU, is safe to dc home.    Discharge Services: None, writer updated [facility] Liaison Woo, cancelled referral to [facility]. DC orders are signed, patient changed to oral abx:   Treated with dexamethasone 1/9 to 1/17. S/p Vanc/Zosyn 1/5 to 1/6, Ceftriaxone 1/6 to 1/17   - Patient will discharge on Amoxacillin to complete antibiotic course    Discharge DME: None    Discharge Transportation: family or friend will provide    Private pay costs discussed: NA    Education Provided on the Discharge Plan:  NA, no RNCC needs identified. EZIO Cain gave patient info on 2 clinics to choose from so that he can also get referrals for mental health care from these 2 systems too (one is [facility]; the other is [facility]).    Persons Notified of Discharge Plans: Patient,  Patient/Family in Agreement with the Plan: yes    Handoff Referral Completed: Yes    Additional Information: MIKE Richard RN, BSN, PHN  Care Coordinator  [facility]  [place]  [address]  Desk phone & confidential voicemail: [phone]  Pager: [phone]

## 2022-01-17 NOTE — DISCHARGE INSTRUCTIONS
It was nice talking with you today, Mr. Lau.  Here is the information we discussed:  Primary care clinics (your provider can refer you to a therapist and psychiatry once primary care is established.  Both [facility] and [facility] have several locations of mental health care. Most therapy is virtual right now):  [facility]:  [phone]  [facility]: [phone]    Resources for Recovery from alcohol use:  [employer]  Https://DINKlife[employer]  Telephone Recovery Support is completely free and confidential  All calls are made from the [facility] offices, Monday to Friday between 9 am and 6 pm.  Contact is by phone only, if you need in-person peer support or resource navigation please contact [employer] and ask for a Recovery Navigator (877-732-2195 OR info@[employer]).  Calls are made by staff and volunteers with at least six months of recovery experience as a person in recovery or recovery ally, who have completed our 6-hour training.    [place] Warmline: 922-893- 4465 or text  Support  to 13184 to receive help through peer support to address loneliness, isolation, increased feelings of anxiety and depression, as well as help people living with mental health conditions with recovery and wellness before a crisis occurs      Out-Patient Mental Health & Addiction Line ([place])  1-564.286.2174  (can access ANY [place] mental health or chemical health services through this line)

## 2022-01-17 NOTE — PLAN OF CARE
Time: 4577-9671    Reason for admit: Respiratory failure (H) [J96.90]  Vitals: WNL.  Maintaining sats on room air.   Activity: Up with SBA as tolerated.   Neuro: A&O x 4.  Hand grasps equal and strong.  Speech is clear and able to follow directions.   Mood/behavior: Pleasant and cooperative.  Lines/drains: R PICC saline locked.  L PIV saline locked.    Cardiac: WNL.  Apical pulse regular.   Respiratory: WNL.  Lung sounds diminished.  Maintaining sats on room air.  No cough or SOB.   Diet: regular  GI/: Voids spontaneously and without difficulty.  Bowel sounds active x 4.  No c/o constipation. No nausea/vomiting.   Skin: WNL  Pain: c/o back pain at an 8/10 today and requested icy hot patch.  Team paged.  Patch currently on L lower back.  Effective.     Plan: Possible discharge home tomorrow.

## 2022-01-17 NOTE — DISCHARGE SUMMARY
[facility]  Hospitalist Discharge Summary      Date of Admission: 1/6/2022  Date of Discharge: 1/17/2022  Discharging Provider: Sydni Roberson PA-C  Discharge Team: Hospitalist Service, Gold 6    Discharge Diagnoses   Bacteremia 2/2 strep pneumoniae PNA  COVID 19 PNA  Resolved acute hypoxic respiratory failure  Alcohol use disorder  GERD  Non-severe protein calorie malnutrition   Physical deconditioning     Follow-ups Needed After Discharge   Follow-up Appointments     [facility] Follow-up and recommended labs and tests      Follow up with primary care provider, Ke Ribeiro, within 7 days for   hospital follow- up.  The following labs/tests are recommended: CBC, CMP.        Appointments on [place] and/or [facility] (with [facility] or [facility]   provider or service). Call [phone] if you haven't heard regarding   these appointments within 7 days of discharge.         - Follow up with PCP within 1 week    Unresulted Labs Ordered in the Past 30 Days of this Admission     No orders found from 12/7/2021 to 1/7/2022.        Discharge Disposition   Discharged to home  Condition at discharge: Stable    Hospital Course   Brent Lau is a 41 year old male with history of of alcohol abuse, tobacco use, and GERD who was intubated in the ED and admitted to MICU 1/6/22 with acute hypoxic respiratory failure and septic shock 2/2 Strep pneumoniae bacteremia and PNA and COVID-19. Extubated 1/14 and transferred to medicine 1/15 for ongoing care      Strep pneumoniae bacteremia and PNA, COVID 19 PNA: Intubated upon arrival 1/5 (extubated 1/140. LA peak 10.5, procal 16.4. COVID+ 1/9. Chest CT 1/5 dense b/l consolidative infiltrates in lower lobes and patchy GGO. BCx x2 1/5 +for Strep pneumo. Sputum cx 1/6 +for strep pneumo and MSSA. Primary  of sepsis and hypoxia due to pneumococcal infection. CXP 1/11 improving opacities. S/p remdesivir x 5 days. Treated with  dexamethasone  to . S/p Vanc/Zosyn  to , Ceftriaxone  to   - Patient will discharge on Amoxacillin to complete antibiotic course  - Patient will discharge on Xarelto x30 days for DVT ppx    Acute hypoxic respiratory failure and septic shock: Resolved. 2/2 strep pneumoniae bacteremia and PNA as well as COVID 19. See notes  and prior for details. On room air prior to discharge    Alcohol use disorder: Per chart, depression and grieving contributing factors of escalating ETOH use x1 year. Drinking up to 1 L of vodka daily. Patient met with addiction medicine team prior to discharge for assistance with ongoing care       Other Medical Issues:  GERD: Continue PPI  Steroid-induced hyperglycemia: No h/o DM. Hyperglycemic after starting steroids. Glucose stable prior to discharge   Non-severe protein calorie malnutrition:  2/2 acute illness and alcohol abuse   Physical deconditionin/2 acute illness. Patient stable for discharge home per therapies  AGMA: Resolved. 22 to lactic acidosis and alcoholic ketosis  Hypokalemia: Replaced per protocol  Hypomagnesemia: Replaced per protocol  Hypocalcemia: Ionized Ca 3.8 on admission. Replaced  Hypophosphatemia:  Replaced per protocol  Chronic anemia: BL hgb 9-10 and stable. Trend with PCP    Consultations This Hospital Stay   Addiction medicine, PT, OT, nutrition, pharmacy, spiritual health, SLP, Chem dep    Code Status   Full Code    Time Spent on this Encounter   ISydni PA-C, personally saw the patient today and spent greater than 30 minutes discharging this patient.       Sydni Roberson PA-C  [facility] [address] [address]  Phone: [phone]  ______________________________________________________________________    Physical Exam   Vital Signs: Temp: 97.6  F (36.4  C) Temp src: Oral BP: 105/66 Pulse: 76   Resp: 18 SpO2: 98 % O2 Device: None (Room air)    Weight: 163 lbs 9.3 oz  General  Appearance: Alert and oriented x3, pleasant  HEENT: Anicteric sclera, MMM   Respiratory: Breathing comfortably on room air. Lung sounds diminished. No wheezing noted  Cardiovascular: RRR, S1, S2. No murmur noted  GI: Abdomen soft, non-tender with active bowel sounds. No guarding or rebound  Lymph/Hematologic: No peripheral edema, distal pulses palpable   Skin: No rash or jaundice   Musculoskeletal: Moves all extremities   Neurologic: No focal deficits, CN II-XII grossly intact         Primary Care Physician   Ke Ribeiro    Discharge Orders      Home infusion referral      Reason for your hospital stay    Admitted with severe sepsis secondary to Pneumococcal pneumonia with resulting acute hypoxic respiratory failure (low oxygen levels) and bacteremia (bacteria in bloodstream). You were also found to be +COVID although it is unclear whether this contributed to your current illness. You were initially managed on a ventilator in the ICU but successfully extubated and currently doing well without supplemental oxygen. No other medical complications were noted during your stay. You are medically stable to discharge home to continue IV antibiotics at home (stop date 1/19/22). Please follow up with your primary care provider in 1 week.     Activity    Your activity upon discharge: activity as tolerated     Adult [facility] Follow-up and recommended labs and tests    Follow up with primary care provider, Ke Ribeiro, within 7 days for hospital follow- up.  The following labs/tests are recommended: CBC, CMP.      Appointments on [place] and/or [facility] (with [facility] or [facility] provider or service). Call [phone] if you haven't heard regarding these appointments within 7 days of discharge.     IV access    **Ordering Provider MUST call/page Care Coordinator/ to discuss arranging this service**    You are going home with the following vascular access device: PICC.     Incentive Spirometry     Continue to use incentive spirometer 4 times a day Until return to normal activity     Walker Order    DME Documentation:   Describe the reason for need to support medical necessity: gait instability, deconditioning.     I, the undersigned, certify that the above prescribed supplies are medically necessary for this patient and is both reasonable and necessary in reference to accepted standards of medical and necessary in reference to accepted standards of medical practice in the treatment of this patient's condition and is not prescribed as a convenience.     Diet    Follow this diet upon discharge:  Regular Diet Adult       Significant Results and Procedures   Most Recent 3 CBC's:Recent Labs   Lab Test 01/17/22  0724 01/16/22  0748 01/15/22  0403   WBC 8.4 7.3 9.0   HGB 9.1* 9.2* 9.3*   MCV 99 97 96    347 339     Most Recent 3 BMP's:Recent Labs   Lab Test 01/17/22  0724 01/17/22  0154 01/16/22  2153 01/16/22  1759 01/16/22  1636 01/16/22  0827 01/16/22  0748 01/15/22  0410 01/15/22  0403     --   --   --   --   --  133  --  135   POTASSIUM 3.7  --   --   --  4.6  --  3.4  --  3.7   CHLORIDE 103  --   --   --   --   --  103  --  100   CO2 21  --   --   --   --   --  24  --  27   BUN 17  --   --   --   --   --  17  --  20   CR 0.73  --   --   --   --   --  0.65*  --  0.65*   ANIONGAP 10  --   --   --   --   --  6  --  8   MARINA 8.5  --   --   --   --   --  8.4*  --  8.8   * 95 108*   < >  --    < > 126*   < > 99    < > = values in this interval not displayed.     Most Recent 2 LFT's:Recent Labs   Lab Test 01/17/22  0724 01/16/22  0748   AST 33 40   ALT 43 52   ALKPHOS 92 95   BILITOTAL 0.4 0.3     Most Recent 3 INR's:Recent Labs   Lab Test 01/05/22  1357   INR 1.08       Discharge Medications   Current Discharge Medication List      START taking these medications    Details   cefTRIAXone (ROCEPHIN) 2 GM vial Inject 2 g into the vein every 24 hours for 2 days  Qty: 40 mL, Refills: 0    Associated  Diagnoses: Pneumococcal bacteremia         CONTINUE these medications which have NOT CHANGED    Details   omeprazole (PRILOSEC) 20 MG DR capsule Take 20 mg by mouth daily           Allergies   No Known Allergies

## 2022-01-18 NOTE — PLAN OF CARE
Physical Therapy Discharge Summary    Reason for therapy discharge:    Discharged to home with home therapy.    Progress towards therapy goal(s). See goals on Care Plan in [facility] electronic health record for goal details.  Goals partially met.  Barriers to achieving goals:   discharge from facility.    Therapy recommendation(s):    Continued therapy is recommended.  Rationale/Recommendations:  patient will benefit from continued skilled PT intervention in the home to address mobility deficits, improve strength & activity tolerance.

## 2022-01-21 PROBLEM — G47.30 SLEEP APNEA: Status: ACTIVE | Noted: 2022-01-21

## 2022-01-24 ENCOUNTER — OFFICE VISIT (OUTPATIENT)
Dept: FAMILY MEDICINE | Facility: CLINIC | Age: 42
End: 2022-01-24
Payer: COMMERCIAL

## 2022-01-24 VITALS — HEART RATE: 124 BPM | DIASTOLIC BLOOD PRESSURE: 78 MMHG | OXYGEN SATURATION: 96 % | SYSTOLIC BLOOD PRESSURE: 121 MMHG

## 2022-01-24 DIAGNOSIS — L84 CORN OR CALLUS: ICD-10-CM

## 2022-01-24 DIAGNOSIS — B35.1 ONYCHOMYCOSIS: ICD-10-CM

## 2022-01-24 DIAGNOSIS — U07.1 INFECTION DUE TO 2019 NOVEL CORONAVIRUS: ICD-10-CM

## 2022-01-24 DIAGNOSIS — F10.21 ALCOHOL USE DISORDER, MODERATE, IN EARLY REMISSION (H): Primary | ICD-10-CM

## 2022-01-24 DIAGNOSIS — J12.82 PNEUMONIA DUE TO 2019 NOVEL CORONAVIRUS: ICD-10-CM

## 2022-01-24 DIAGNOSIS — J96.01 ACUTE RESPIRATORY FAILURE WITH HYPOXIA (H): ICD-10-CM

## 2022-01-24 DIAGNOSIS — U07.1 PNEUMONIA DUE TO 2019 NOVEL CORONAVIRUS: ICD-10-CM

## 2022-01-24 DIAGNOSIS — M62.81 GENERALIZED MUSCLE WEAKNESS: ICD-10-CM

## 2022-01-24 DIAGNOSIS — R26.2 UNABLE TO AMBULATE: ICD-10-CM

## 2022-01-24 DIAGNOSIS — L30.1 DYSHIDROTIC ECZEMA: ICD-10-CM

## 2022-01-24 DIAGNOSIS — Z09 HOSPITAL DISCHARGE FOLLOW-UP: ICD-10-CM

## 2022-01-24 DIAGNOSIS — Z71.6 ENCOUNTER FOR SMOKING CESSATION COUNSELING: ICD-10-CM

## 2022-01-24 DIAGNOSIS — D52.0 DIETARY FOLATE DEFICIENCY ANEMIA: ICD-10-CM

## 2022-01-24 PROBLEM — E87.29 HIGH ANION GAP METABOLIC ACIDOSIS: Status: RESOLVED | Noted: 2022-01-05 | Resolved: 2022-01-24

## 2022-01-24 PROBLEM — J96.90 RESPIRATORY FAILURE (H): Status: RESOLVED | Noted: 2022-01-06 | Resolved: 2022-01-24

## 2022-01-24 PROBLEM — R06.82 TACHYPNEA: Status: RESOLVED | Noted: 2022-01-05 | Resolved: 2022-01-24

## 2022-01-24 PROBLEM — E87.20 LACTIC ACIDOSIS: Status: RESOLVED | Noted: 2022-01-05 | Resolved: 2022-01-24

## 2022-01-24 LAB
ALBUMIN SERPL-MCNC: 2.5 G/DL (ref 3.5–5)
ALP SERPL-CCNC: 85 U/L (ref 45–120)
ALT SERPL W P-5'-P-CCNC: 17 U/L (ref 0–45)
ANION GAP SERPL CALCULATED.3IONS-SCNC: 12 MMOL/L (ref 5–18)
AST SERPL W P-5'-P-CCNC: 16 U/L (ref 0–40)
BILIRUB SERPL-MCNC: 0.4 MG/DL (ref 0–1)
BUN SERPL-MCNC: 7 MG/DL (ref 8–22)
CALCIUM SERPL-MCNC: 9 MG/DL (ref 8.5–10.5)
CHLORIDE BLD-SCNC: 100 MMOL/L (ref 98–107)
CO2 SERPL-SCNC: 22 MMOL/L (ref 22–31)
CREAT SERPL-MCNC: 0.73 MG/DL (ref 0.7–1.3)
ERYTHROCYTE [DISTWIDTH] IN BLOOD BY AUTOMATED COUNT: 12.5 % (ref 10–15)
GFR SERPL CREATININE-BSD FRML MDRD: >90 ML/MIN/1.73M2
GLUCOSE BLD-MCNC: 99 MG/DL (ref 70–125)
HCT VFR BLD AUTO: 32.3 % (ref 40–53)
HGB BLD-MCNC: 10.9 G/DL (ref 13.3–17.7)
MCH RBC QN AUTO: 31.8 PG (ref 26.5–33)
MCHC RBC AUTO-ENTMCNC: 33.7 G/DL (ref 31.5–36.5)
MCV RBC AUTO: 94 FL (ref 78–100)
PLATELET # BLD AUTO: 379 10E3/UL (ref 150–450)
POTASSIUM BLD-SCNC: 4.4 MMOL/L (ref 3.5–5)
PROT SERPL-MCNC: 7.6 G/DL (ref 6–8)
RBC # BLD AUTO: 3.43 10E6/UL (ref 4.4–5.9)
SODIUM SERPL-SCNC: 134 MMOL/L (ref 136–145)
WBC # BLD AUTO: 8.2 10E3/UL (ref 4–11)

## 2022-01-24 PROCEDURE — 36415 COLL VENOUS BLD VENIPUNCTURE: CPT | Performed by: STUDENT IN AN ORGANIZED HEALTH CARE EDUCATION/TRAINING PROGRAM

## 2022-01-24 PROCEDURE — 99214 OFFICE O/P EST MOD 30 MIN: CPT | Performed by: STUDENT IN AN ORGANIZED HEALTH CARE EDUCATION/TRAINING PROGRAM

## 2022-01-24 PROCEDURE — 80053 COMPREHEN METABOLIC PANEL: CPT | Performed by: STUDENT IN AN ORGANIZED HEALTH CARE EDUCATION/TRAINING PROGRAM

## 2022-01-24 PROCEDURE — 85027 COMPLETE CBC AUTOMATED: CPT | Performed by: STUDENT IN AN ORGANIZED HEALTH CARE EDUCATION/TRAINING PROGRAM

## 2022-01-24 RX ORDER — FOLIC ACID 0.8 MG
800 TABLET ORAL DAILY
Qty: 30 TABLET | Refills: 2 | Status: SHIPPED | OUTPATIENT
Start: 2022-01-24 | End: 2022-03-24

## 2022-01-24 RX ORDER — NICOTINE 21 MG/24HR
1 PATCH, TRANSDERMAL 24 HOURS TRANSDERMAL EVERY 24 HOURS
Qty: 21 PATCH | Refills: 0 | Status: SHIPPED | OUTPATIENT
Start: 2022-01-24 | End: 2022-02-14

## 2022-01-24 RX ORDER — NALTREXONE HYDROCHLORIDE 50 MG/1
50 TABLET, FILM COATED ORAL DAILY
Qty: 30 TABLET | Refills: 2 | Status: SHIPPED | OUTPATIENT
Start: 2022-01-24

## 2022-01-24 RX ORDER — TRIAMCINOLONE ACETONIDE 1 MG/G
OINTMENT TOPICAL
Qty: 30 G | Refills: 0 | Status: SHIPPED | OUTPATIENT
Start: 2022-01-24

## 2022-01-24 NOTE — PROGRESS NOTES
Hospital Follow-up Visit:    Assessment and Plan     41-year-old male with past medical history of alcohol use disorder in early remission, tobacco use disorder in early remission who presents for hospital follow-up from severe Covid pneumonia and bacteremia requiring ICU admission.  Significant degradation of his strength due to a combination of Covid pneumonia and ICU myopathy as well as more chronic alcohol use disorder.    1. Alcohol use disorder, moderate, in early remission (H)  Patient committed to not drinking as of now.  I still recommended he consider medication to help with cravings and he agreed.  We will start him on naltrexone.  On B12 replacement due to his history but not prescribed folic acid so I will add.  - folic acid 800 MCG tablet; Take 1 tablet (800 mcg) by mouth daily  Dispense: 30 tablet; Refill: 2  - naltrexone (DEPADE/REVIA) 50 MG tablet; Take 1 tablet (50 mg) by mouth daily  Dispense: 30 tablet; Refill: 2    2. Acute respiratory failure with hypoxia (H)  4. Infection due to 2019 novel coronavirus  5. Pneumonia due to 2019 novel coronavirus  6. Generalized muscle weakness  7. Unable to ambulate  10. Hospital discharge follow-up  Patient improving from hospitalization due to severe Covid pneumonia requiring intubation.  Still significant weakness and difficulty ambulating as well as performing any sort of more stressful physical activity.  I recommend referral to physical therapy to help improve this as well as OT.  - Comprehensive metabolic panel (BMP + Alb, Alk Phos, ALT, AST, Total. Bili, TP); Future  - Home Care Nursing Referral  - Comprehensive metabolic panel (BMP + Alb, Alk Phos, ALT, AST, Total. Bili, TP)      3. Dietary folate deficiency anemia  Patient anemic during hospitalization.  I would guess combination of critical illness and possibly his alcohol use disorder.  No longer drinking.  We will recheck today.  Replacing folic acid and B12.  - CBC with platelets; Future  - CBC  with platelets    8. Encounter for smoking cessation counseling  Patient quit smoking because of intense hospitalization.  Requesting medicine to help him maintain this.  Interested in nicotine replacement.  Did discuss Chantix but patient already having vivid dreams post hospitalization so not interested in this.  - nicotine (NICODERM CQ) 14 MG/24HR 24 hr patch; Place 1 patch onto the skin every 24 hours for 21 days  Dispense: 21 patch; Refill: 0  - nicotine (NICORETTE) 2 MG gum; Place 1 each (2 mg) inside cheek every hour as needed for smoking cessation  Dispense: 20 each; Refill: 1    9. Dyshidrotic eczema  Significant xerotic appearing lesion on patient's hands most consistent with dyshidrotic eczema.  I recommended trialing a steroid cream to improve this.  Already using lotion daily.  - triamcinolone (KENALOG) 0.1 % external ointment; Apply sparingly to affected area two times daily  Dispense: 30 g; Refill: 0    11. Onychomycosis  12. Corn or callus  Patient with significant onychomycoses and calluses and corns on his feet bilaterally would benefit from podiatry foot care.  Will refer.  - Orthopedic  Referral; Future    Galindo Dixon MD    [facility]  Hospital/Nursing Home/ Rehab Facility:[facility]  Date of Admission: 1/6/22  Date of Discharge: 1/17/22  Reason(s) for Admission: COVID pneumonia, bacteremia     Post Discharge Medication Reconciliation: completed by nurse and myself    Summary of hospitalization:  [facility] discharge summary reviewed and copied below       Brent Lau is a 41 year old male with history of of alcohol abuse, tobacco use, and GERD who was intubated in the ED and admitted to MICU 1/6/22 with acute hypoxic respiratory failure and septic shock 2/2 Strep pneumoniae bacteremia and PNA and COVID-19. Extubated 1/14 and transferred to medicine 1/15 for ongoing care      Strep pneumoniae bacteremia and PNA, COVID 19 PNA: Intubated upon arrival 1/5 (extubated 1/140. LA peak  10.5, procal 16.4. COVID+ . Chest CT  dense b/l consolidative infiltrates in lower lobes and patchy GGO. BCx x2  +for Strep pneumo. Sputum cx  +for strep pneumo and MSSA. Primary  of sepsis and hypoxia due to pneumococcal infection. CXP  improving opacities. S/p remdesivir x 5 days. Treated with dexamethasone  to . S/p Vanc/Zosyn  to , Ceftriaxone  to   - Patient will discharge on Amoxacillin to complete antibiotic course  - Patient will discharge on Xarelto x30 days for DVT ppx     Acute hypoxic respiratory failure and septic shock: Resolved. 2/2 strep pneumoniae bacteremia and PNA as well as COVID 19. See notes  and prior for details. On room air prior to discharge     Alcohol use disorder: Per chart, depression and grieving contributing factors of escalating ETOH use x1 year. Drinking up to 1 L of vodka daily. Patient met with addiction medicine team prior to discharge for assistance with ongoing care        Other Medical Issues:  GERD: Continue PPI  Steroid-induced hyperglycemia: No h/o DM. Hyperglycemic after starting steroids. Glucose stable prior to discharge   Non-severe protein calorie malnutrition:  2/2 acute illness and alcohol abuse   Physical deconditionin/2 acute illness. Patient stable for discharge home per therapies  AGMA: Resolved. 2/2 to lactic acidosis and alcoholic ketosis  Hypokalemia: Replaced per protocol  Hypomagnesemia: Replaced per protocol  Hypocalcemia: Ionized Ca 3.8 on admission. Replaced  Hypophosphatemia:  Replaced per protocol  Chronic anemia: BL hgb 9-10 and stable. Trend with PCP    Diagnostic Tests/Treatments reviewed:  Yes- hemoglobin  on discharge    Other Healthcare Providers Involved in Patient's Care:    None    Update since discharge: improved.    Patient here with his wife Zeynep.  Tell me that patient overall is improved since the hospital but is still having some persistent symptoms of fatigue, weakness unable to  perform more intense labor such as walking long distances or lifting carrying having items.  Does not feel he can return to work due to this.  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  They have already discussed this with work and will be sending the [facility] paperwork.  Discussed that I would fill this out with him returning to work for a couple hours a couple times a week but with restrictions on lifting, walking, pushing, and pulling.  Would observe these over the next couple weeks and start to increase from there.    Wife and patient have a couple of concerns today still however.  These include if he should be seen by therapy due to this weakness which I do think he should..  Also wondering if I can look at his hands and feet which they say are very dry and have significant callus.      I asked about alcohol use which patient was drinking approximately 1 L of vodka daily before he was admitted with Covid.  He has since not drink any alcohol.  He does not feel desire to drink now but notes if he improves with his fatigue he may want to.  Interested in medications to help with alcohol cravings.  He also stopped smoking since the hospital.  He is having cravings to start this again and is interested in nicotine replacement.         Review of Systems:   Complete ROS negative except as noted in the HPI            Physical Exam:   /78 (BP Location: Left arm, Patient Position: Sitting, Cuff Size: Adult Regular)   Pulse (!) 124   SpO2 96%     General appearance: Alert, cooperative, no distress, appears stated age  Head: Normocephalic, atraumatic, without obvious abnormality  Eyes: Pupils equal round, reactive.  Conjunctiva clear.  Nose: Nares normal, no drainage.  Throat: Lips, mucosa, tongue normal mucosa pink and moist  Neck: Supple, symmetric, trachea midline, no adenopathy.  No thyroid enlargement, tenderness or nodules.    Lungs: Clear to auscultation bilaterally, no wheezing or crackles present.  Respirations  unlabored  Heart: Regular rate and rhythm, normal S1 and S2, no murmur, rub or gallop.  Abdomen: Soft,  Mildly tender in RUQ, nondistended.  Bowel sounds active in all 4 quadrants.  No masses or organomegaly.  Extremities: Extremities normal, atraumatic.  No cyanosis or edema.  Skin: Extremely xerotic appearing hands and feet with significant callus formation and fissures seen in his hands.  Several corns seen on bottom of feet.  Neurologic: CN II through XII intact, normal strength.

## 2022-01-25 NOTE — RESULT ENCOUNTER NOTE
I called and spoke with the patient's wife about his recent clinic visit results. I answered any questions they had.      Dr. Galindo Dixon

## 2022-01-31 ENCOUNTER — TELEPHONE (OUTPATIENT)
Dept: FAMILY MEDICINE | Facility: CLINIC | Age: 42
End: 2022-01-31
Payer: COMMERCIAL

## 2022-01-31 NOTE — TELEPHONE ENCOUNTER
Narciso with [facility] calling for verbal orders for nursing care.  Wants to confirm first that Dr. Ribeiro will follow patient. Call back number is

## 2022-01-31 NOTE — TELEPHONE ENCOUNTER
Writer called and LMTCB to schedule a V V to discuss home care orders. Writer called Narciso at the number given as well and was not able to get him to leave a message.

## 2022-02-01 NOTE — TELEPHONE ENCOUNTER
RECORDS RECEIVED FROM: onychomycosis/corn or callus/Dr. Srinivasan/HP/no image/ortho con   DATE RECEIVED: Mar 1, 2022     NOTES STATUS DETAILS   OFFICE NOTE from referring provider Internal Galindo Srinivasan MD

## 2022-02-03 ENCOUNTER — TELEPHONE (OUTPATIENT)
Dept: FAMILY MEDICINE | Facility: CLINIC | Age: 42
End: 2022-02-03
Payer: COMMERCIAL

## 2022-02-03 NOTE — TELEPHONE ENCOUNTER
Narciso from [facility] called wondering if we had heard anything from patient or if anyone has been able to get a hold of them. Narciso stated since they have not been able to get ahold of patient they are not admitting him to care. Clinic may reach out at another time if they still need a home care referral.  Mehnaz Feldman LPN

## 2022-02-04 NOTE — TELEPHONE ENCOUNTER
Writer called patient's mother and she had not heard from her son either so she would try to get in touch with him and have him call us here at the clinic, clinic number given to Jerica, patient's mother.

## 2022-02-07 ENCOUNTER — OFFICE VISIT (OUTPATIENT)
Dept: FAMILY MEDICINE | Facility: CLINIC | Age: 42
End: 2022-02-07
Payer: COMMERCIAL

## 2022-02-07 VITALS
OXYGEN SATURATION: 96 % | WEIGHT: 158.2 LBS | SYSTOLIC BLOOD PRESSURE: 120 MMHG | DIASTOLIC BLOOD PRESSURE: 82 MMHG | BODY MASS INDEX: 22.7 KG/M2 | HEART RATE: 107 BPM

## 2022-02-07 DIAGNOSIS — U07.1 INFECTION DUE TO 2019 NOVEL CORONAVIRUS: ICD-10-CM

## 2022-02-07 DIAGNOSIS — F10.21 ALCOHOL USE DISORDER, MODERATE, IN EARLY REMISSION (H): ICD-10-CM

## 2022-02-07 DIAGNOSIS — Z02.89 ENCOUNTER FOR COMPLETION OF FORM WITH PATIENT: ICD-10-CM

## 2022-02-07 DIAGNOSIS — Z71.6 ENCOUNTER FOR SMOKING CESSATION COUNSELING: ICD-10-CM

## 2022-02-07 DIAGNOSIS — U07.1 PNEUMONIA DUE TO 2019 NOVEL CORONAVIRUS: ICD-10-CM

## 2022-02-07 DIAGNOSIS — J12.82 PNEUMONIA DUE TO 2019 NOVEL CORONAVIRUS: ICD-10-CM

## 2022-02-07 DIAGNOSIS — N52.9 ERECTILE DYSFUNCTION, UNSPECIFIED ERECTILE DYSFUNCTION TYPE: Primary | ICD-10-CM

## 2022-02-07 PROCEDURE — 99214 OFFICE O/P EST MOD 30 MIN: CPT | Performed by: STUDENT IN AN ORGANIZED HEALTH CARE EDUCATION/TRAINING PROGRAM

## 2022-02-07 RX ORDER — SILDENAFIL 50 MG/1
50 TABLET, FILM COATED ORAL DAILY PRN
Qty: 20 TABLET | Refills: 0 | Status: SHIPPED | OUTPATIENT
Start: 2022-02-07 | End: 2022-03-24

## 2022-02-07 NOTE — PROGRESS NOTES
Assessment and Plan     41-year-old male with past medical history of alcohol use disorder now in remission, smoking also currently attempting to quit, recent Covid pneumonia infection requiring hospitalization in the ICU who presents for follow-up from hospital discharge appointment and establishment of care with myself.  Patient estimating his approximately 60% improved from hospital discharge.  Estimate that he will be feeling normal in approximately a month and completed forms regarding this.  He is currently taking a leave of absence from work.  I do think that returning to work with some light restrictions would be beneficial to his provement and recommended he call his human resources to see if this would be an option.  Congratulated patient on continuing sobriety from alcohol and he continues use naltrexone for this.  Also still is not smoking using nicotine patches to quit.  He did have a concern today of worsening erectile dysfunction after catheter placement in the hospital.  We will trial him on sildenafil to see if this improves this.    1. Erectile dysfunction, unspecified erectile dysfunction type  - sildenafil (VIAGRA) 50 MG tablet; Take 1 tablet (50 mg) by mouth daily as needed (erectile dysfunction)  Dispense: 20 tablet; Refill: 0    2. Encounter for completion of form with patient  3. Infection due to 2019 novel coronavirus  4. Pneumonia due to 2019 novel coronavirus    5. Alcohol use disorder, moderate, in early remission (H)    6. Encounter for smoking cessation counseling    Follow up: 3 months for physical  Options for treatment and follow-up care were reviewed with the patient and/or guardian. Brent Lau and/or guardian engaged in the decision making process and verbalized understanding of the options discussed and agreed with the final plan.    Dr. Galindo Dixon         HPI:   Brent Lau is a 41 year old  male who presents for:    Chief Complaint   Patient presents with     follow up      Patient here to follow-up from visit 2 weeks ago where they initially saw him post hospitalization for Covid.  Fairly severe requiring ICU admission.  He has several updates from me.  Tells me he is feeling stronger and is able to climb the stairs without getting short of breath now.  Estimates he is approximately 60 sent improved from the hospital discharge.  Needs me to fill out some additional paperwork regarding work.  He does not qualify for FMLA unfortunately as she was not there long enough before his hospitalization.  Taking a leave of absence which I filled out accordingly.  Also will write him a letter.      Patient also has concern today of erectile dysfunction.  Him and his wife have noticed some troubles with this since coming home from the hospital and him having a catheter in the hospital.         PMHX:     Patient Active Problem List   Diagnosis     H/O echocardiogram     Gastroesophageal reflux disease, unspecified whether esophagitis present     Sinus tachycardia     Sleep apnea     Smoker       Current Outpatient Medications   Medication Sig Dispense Refill     folic acid 800 MCG tablet Take 1 tablet (800 mcg) by mouth daily 30 tablet 2     naltrexone (DEPADE/REVIA) 50 MG tablet Take 1 tablet (50 mg) by mouth daily 30 tablet 2     nicotine (NICODERM CQ) 14 MG/24HR 24 hr patch Place 1 patch onto the skin every 24 hours for 21 days 21 patch 0     omeprazole (PRILOSEC) 20 MG DR capsule Take 20 mg by mouth daily       rivaroxaban ANTICOAGULANT (XARELTO ANTICOAGULANT) 10 MG TABS tablet Take 1 tablet (10 mg) by mouth daily (with dinner) 30 tablet 0     thiamine (B-1) 100 MG tablet Take 1 tablet (100 mg) by mouth daily 60 tablet 0     triamcinolone (KENALOG) 0.1 % external ointment Apply sparingly to affected area two times daily 30 g 0     amoxicillin (AMOXIL) 875 MG tablet Take 1 tablet (875 mg) by mouth 2 times daily (Patient not taking: Reported on 1/24/2022) 4 tablet 0     nicotine (NICORETTE) 2  MG gum Place 1 each (2 mg) inside cheek every hour as needed for smoking cessation (Patient not taking: Reported on 2/7/2022) 20 each 1       Social History     Tobacco Use     Smoking status: Current Every Day Smoker     Types: Cigarettes, Cigarettes     Smokeless tobacco: Former User   Substance Use Topics     Alcohol use: Not on file     Drug use: Not on file       Social History     Social History Narrative     Not on file       No Known Allergies    No results found for this or any previous visit (from the past 24 hour(s)).         Review of Systems:    ROS: 10 point ROS neg other than the symptoms noted above in the HPI.         Physical Exam:     Vitals:    02/07/22 1111   BP: 120/82   BP Location: Left arm   Patient Position: Sitting   Cuff Size: Adult Regular   Pulse: 107   SpO2: 96%   Weight: 71.8 kg (158 lb 3.2 oz)     Body mass index is 22.7 kg/m .    General appearance: Alert, cooperative, no distress, appears stated age  Head: Normocephalic, atraumatic, without obvious abnormality  Eyes: Pupils equal round, reactive.  Conjunctiva clear.  Nose: Nares normal, no drainage.  Throat: Lips, mucosa, tongue normal mucosa pink and moist  Neck: Supple, symmetric, trachea midline  Lungs: coarse, Clear to auscultation bilaterally, no wheezing or crackles present.  Respirations unlabored  Heart: Regular rate and rhythm, normal S1 and S2, no murmur, rub or gallop.  Extremities: Extremities normal, atraumatic.  No cyanosis or edema.  Skin: Skin color, texture, turgor normal no rashes or lesions on limited skin exam  Neurologic: CN II through XII intact, normal strength.

## 2022-02-07 NOTE — LETTER
RETURN TO WORK/SCHOOL FORM    2/7/2022    Re: Brent Lau  1980      To Whom It May Concern:     Brent Lau was seen in clinic today. Please excuse him from work 1/5/22 through 3/7/22 due to medical complications.  He may return to work without restrictions on 3/8/22         Galindo Srinivasan MD  2/7/2022 11:43 AM

## 2022-02-09 LAB
BACTERIA BLD CULT: ABNORMAL
BACTERIA BLD CULT: ABNORMAL

## 2022-02-18 NOTE — PROGRESS NOTES
This is a recent snapshot of the patient's [facility] medical record.  For current drug dose and complete information and questions, call [phone] or In Basket pool, [facility] ([id number])  CSN Number:  [id number]

## 2022-03-01 ENCOUNTER — PRE VISIT (OUTPATIENT)
Dept: ORTHOPEDICS | Facility: CLINIC | Age: 42
End: 2022-03-01

## 2022-03-24 ENCOUNTER — OFFICE VISIT (OUTPATIENT)
Dept: FAMILY MEDICINE | Facility: CLINIC | Age: 42
End: 2022-03-24
Payer: COMMERCIAL

## 2022-03-24 VITALS
HEART RATE: 96 BPM | SYSTOLIC BLOOD PRESSURE: 130 MMHG | OXYGEN SATURATION: 99 % | TEMPERATURE: 98.7 F | DIASTOLIC BLOOD PRESSURE: 88 MMHG | BODY MASS INDEX: 24.67 KG/M2 | WEIGHT: 176.2 LBS | HEIGHT: 71 IN

## 2022-03-24 DIAGNOSIS — G47.30 SLEEP APNEA, UNSPECIFIED TYPE: ICD-10-CM

## 2022-03-24 DIAGNOSIS — Z11.4 SCREENING FOR HIV (HUMAN IMMUNODEFICIENCY VIRUS): ICD-10-CM

## 2022-03-24 DIAGNOSIS — Z23 HIGH PRIORITY FOR 2019-NCOV VACCINE: Primary | ICD-10-CM

## 2022-03-24 DIAGNOSIS — Z00.00 ANNUAL PHYSICAL EXAM: ICD-10-CM

## 2022-03-24 DIAGNOSIS — G93.32 POST-COVID CHRONIC FATIGUE: ICD-10-CM

## 2022-03-24 DIAGNOSIS — F10.21 ALCOHOL DEPENDENCE IN REMISSION (H): ICD-10-CM

## 2022-03-24 DIAGNOSIS — K21.9 GASTROESOPHAGEAL REFLUX DISEASE, UNSPECIFIED WHETHER ESOPHAGITIS PRESENT: ICD-10-CM

## 2022-03-24 DIAGNOSIS — Z11.59 ENCOUNTER FOR HEPATITIS C SCREENING TEST FOR LOW RISK PATIENT: ICD-10-CM

## 2022-03-24 DIAGNOSIS — F17.200 SMOKER: ICD-10-CM

## 2022-03-24 DIAGNOSIS — L30.1 DYSHIDROTIC ECZEMA: ICD-10-CM

## 2022-03-24 DIAGNOSIS — U09.9 POST-COVID CHRONIC FATIGUE: ICD-10-CM

## 2022-03-24 DIAGNOSIS — N52.9 ERECTILE DYSFUNCTION, UNSPECIFIED ERECTILE DYSFUNCTION TYPE: ICD-10-CM

## 2022-03-24 PROBLEM — R00.0 SINUS TACHYCARDIA: Status: RESOLVED | Noted: 2022-01-05 | Resolved: 2022-03-24

## 2022-03-24 LAB
CHOLEST SERPL-MCNC: 208 MG/DL
ERYTHROCYTE [DISTWIDTH] IN BLOOD BY AUTOMATED COUNT: 14 % (ref 10–15)
FASTING STATUS PATIENT QL REPORTED: NO
HCT VFR BLD AUTO: 37.5 % (ref 40–53)
HDLC SERPL-MCNC: 43 MG/DL
HGB BLD-MCNC: 12.5 G/DL (ref 13.3–17.7)
HIV 1+2 AB+HIV1 P24 AG SERPL QL IA: NEGATIVE
LDLC SERPL CALC-MCNC: 129 MG/DL
MCH RBC QN AUTO: 30.3 PG (ref 26.5–33)
MCHC RBC AUTO-ENTMCNC: 33.3 G/DL (ref 31.5–36.5)
MCV RBC AUTO: 91 FL (ref 78–100)
PLATELET # BLD AUTO: 197 10E3/UL (ref 150–450)
RBC # BLD AUTO: 4.12 10E6/UL (ref 4.4–5.9)
TRIGL SERPL-MCNC: 179 MG/DL
WBC # BLD AUTO: 5.8 10E3/UL (ref 4–11)

## 2022-03-24 PROCEDURE — 36415 COLL VENOUS BLD VENIPUNCTURE: CPT | Performed by: STUDENT IN AN ORGANIZED HEALTH CARE EDUCATION/TRAINING PROGRAM

## 2022-03-24 PROCEDURE — 90732 PPSV23 VACC 2 YRS+ SUBQ/IM: CPT | Performed by: STUDENT IN AN ORGANIZED HEALTH CARE EDUCATION/TRAINING PROGRAM

## 2022-03-24 PROCEDURE — 91305 COVID-19,PF,PFIZER (12+ YRS): CPT | Performed by: STUDENT IN AN ORGANIZED HEALTH CARE EDUCATION/TRAINING PROGRAM

## 2022-03-24 PROCEDURE — 80061 LIPID PANEL: CPT | Performed by: STUDENT IN AN ORGANIZED HEALTH CARE EDUCATION/TRAINING PROGRAM

## 2022-03-24 PROCEDURE — 86803 HEPATITIS C AB TEST: CPT | Performed by: STUDENT IN AN ORGANIZED HEALTH CARE EDUCATION/TRAINING PROGRAM

## 2022-03-24 PROCEDURE — 0052A COVID-19,PF,PFIZER (12+ YRS): CPT | Performed by: STUDENT IN AN ORGANIZED HEALTH CARE EDUCATION/TRAINING PROGRAM

## 2022-03-24 PROCEDURE — 87389 HIV-1 AG W/HIV-1&-2 AB AG IA: CPT | Performed by: STUDENT IN AN ORGANIZED HEALTH CARE EDUCATION/TRAINING PROGRAM

## 2022-03-24 PROCEDURE — 90471 IMMUNIZATION ADMIN: CPT | Performed by: STUDENT IN AN ORGANIZED HEALTH CARE EDUCATION/TRAINING PROGRAM

## 2022-03-24 PROCEDURE — 99396 PREV VISIT EST AGE 40-64: CPT | Mod: 25 | Performed by: STUDENT IN AN ORGANIZED HEALTH CARE EDUCATION/TRAINING PROGRAM

## 2022-03-24 PROCEDURE — 85027 COMPLETE CBC AUTOMATED: CPT | Performed by: STUDENT IN AN ORGANIZED HEALTH CARE EDUCATION/TRAINING PROGRAM

## 2022-03-24 RX ORDER — SILDENAFIL 100 MG/1
100 TABLET, FILM COATED ORAL DAILY PRN
Qty: 20 TABLET | Refills: 0 | Status: SHIPPED | OUTPATIENT
Start: 2022-03-24 | End: 2022-11-16

## 2022-03-24 NOTE — PROGRESS NOTES
Assessment/ Plan     41-year-old male with hospitalization 2 months ago for severe Covid infection now has post Covid fatigue that he is slowly recovering from approximately 80% improved.  Also has a history of alcohol dependence now in remission, tobacco use disorder attempting to quit, sleep apnea, GERD, dyshidrotic eczema who presents for follow-up and annual physical exam.    1. Smoker  Currently trying to quit. Using nicotine patches    2. Alcohol dependence in remission (H)  Patient drinking significant mount of vodka daily before admission for Covid in December 2021.  Now in remission.  Prescribed him a course of naltrexone but he does not know if this is really helping curb his cravings.  He will continue for now but may stop in the future.  - continue naltrexone 50 mg daily    3. Erectile dysfunction, unspecified erectile dysfunction type  Patient finding some benefit with sildenafil.  However mild and feels like he needs an increased dose which is reasonable.  Will increase from 50 to 100 mg.  - sildenafil (VIAGRA) 100 MG tablet; Take 1 tablet (100 mg) by mouth daily as needed (erectile dysfunction)  Dispense: 20 tablet; Refill: 0    4. Sleep apnea, unspecified type  Did a sleep study a long time when he weighed about 80 lbs more. He left CPAP in old house with moving.   - Adult Sleep Eval & Management  Referral; Future    5. Gastroesophageal reflux disease, unspecified whether esophagitis present  Heartburn with spicy food. Just avoids certain foods    6. Post-COVID chronic fatigue    7. Dyshidrotic eczema  Uses Kenalog ointment intermittently to help control this over hands    8. High priority for 2019-nCoV vaccine  - COVID-19,PF,PFIZER (12+ Yrs GRAY LABEL)    9. Annual physical exam  - CBC with platelets; Future  - Lipid panel reflex to direct LDL Non-fasting; Future    10. Screening for HIV (human immunodeficiency virus)  - HIV Antigen Antibody Combo; Future    11. Encounter for hepatitis C  screening test for low risk patient  - Hepatitis C antibody; Future      Follow-up in: 1 year for physical    Galindo Dixon MD    Subjective:     Brent Lau is a 41 year old female who presents for an annual exam.     Chief Complaint   Patient presents with     Physical     Patient had severe COVID hospitalization approximately 2 months ago.  Requiring ICU admission and intubation.  I had been seeing patient since in follow-up from his hospitalization to help with post Covid symptoms and work restrictions as well as various other concerns including alcohol use disorder and severe dyshidrotic eczema on his hands among others.  I recommend he have a physical which is why he is here today.  He feels he is Unfortunately physical therapy never happened for some unknown reason. He is estimated he at 80% improved. He has gained approximately 20 lbs since last visit. He still gets fatigued and feels weak and has balance issues occasionally.     Answers for HPI/ROS submitted by the patient on 3/24/2022  Frequency of exercise:: 2-3 days/week  Getting at least 3 servings of Calcium per day:: Yes  Diet:: Regular (no restrictions)  Taking medications regularly:: Yes  Medication side effects:: Other  Bi-annual eye exam:: NO  Dental care twice a year:: NO  Sleep apnea or symptoms of sleep apnea:: Sleep apnea  Additional concerns today:: No  Duration of exercise:: 15-30 minutes    Colonoscopy: No family Hx  PSA: No family Hx    Immunization History   Administered Date(s) Administered     COVID-19,PF,Pfizer (12+ Yrs) 11/17/2021     Influenza Vaccine IM > 6 months Valent IIV4 (Alfuria,Fluzone) 12/29/2014     MMR 10/22/1997     Td (Adult), Adsorbed 09/20/1994     Tdap (Adacel,Boostrix) 12/29/2014, 07/25/2019     Immunization status: due today.     Current Outpatient Medications   Medication Sig Dispense Refill     folic acid 800 MCG tablet Take 1 tablet (800 mcg) by mouth daily 30 tablet 2     naltrexone (DEPADE/REVIA) 50 MG tablet  Take 1 tablet (50 mg) by mouth daily 30 tablet 2     rivaroxaban ANTICOAGULANT (XARELTO ANTICOAGULANT) 10 MG TABS tablet Take 1 tablet (10 mg) by mouth daily (with dinner) 30 tablet 0     sildenafil (VIAGRA) 50 MG tablet Take 1 tablet (50 mg) by mouth daily as needed (erectile dysfunction) 20 tablet 0     triamcinolone (KENALOG) 0.1 % external ointment Apply sparingly to affected area two times daily 30 g 0     amoxicillin (AMOXIL) 875 MG tablet Take 1 tablet (875 mg) by mouth 2 times daily (Patient not taking: Reported on 1/24/2022) 4 tablet 0     nicotine (NICORETTE) 2 MG gum Place 1 each (2 mg) inside cheek every hour as needed for smoking cessation (Patient not taking: Reported on 2/7/2022) 20 each 1     omeprazole (PRILOSEC) 20 MG DR capsule Take 20 mg by mouth daily (Patient not taking: Reported on 3/24/2022)       thiamine (B-1) 100 MG tablet Take 1 tablet (100 mg) by mouth daily (Patient not taking: Reported on 3/24/2022) 60 tablet 0     Past Medical History:   Diagnosis Date     Acid reflux      Past Surgical History:   Procedure Laterality Date     NO PAST SURGERIES       PICC TRIPLE LUMEN PLACEMENT  1/5/2022          Patient has no known allergies.  Family History   Problem Relation Age of Onset     Diabetes Mother      Hypertension Mother      No Known Problems Sister      No Known Problems Brother      Social History     Socioeconomic History     Marital status:      Spouse name: Not on file     Number of children: Not on file     Years of education: Not on file     Highest education level: Not on file   Occupational History     Not on file   Tobacco Use     Smoking status: Current Every Day Smoker     Types: Cigarettes, Cigarettes     Smokeless tobacco: Former User   Substance and Sexual Activity     Alcohol use: Not on file     Drug use: Not on file     Sexual activity: Not on file   Other Topics Concern     Not on file   Social History Narrative     Not on file     Social Determinants of  "Health     Financial Resource Strain: Not on file   Food Insecurity: Not on file   Transportation Needs: Not on file   Physical Activity: Not on file   Stress: Not on file   Social Connections: Not on file   Intimate Partner Violence: Not on file   Housing Stability: Not on file       Review of Systems  Complete ROS negative except as noted in the HPI    Objective:      Vitals:    03/24/22 1419   BP: 130/88   BP Location: Left arm   Patient Position: Sitting   Cuff Size: Adult Regular   Pulse: 96   Temp: 98.7  F (37.1  C)   TempSrc: Temporal   SpO2: 99%   Weight: 79.9 kg (176 lb 3.2 oz)   Height: 1.81 m (5' 11.25\")       General appearance: Alert, cooperative, no distress, appears stated age  Head: Normocephalic, atraumatic, without obvious abnormality  EARS: TM's gray dull with structures seen bilaterally  Eyes: Pupils equal round, reactive.  Conjunctiva clear.  Nose: Nares normal, no drainage.  Throat: Lips, mucosa, tongue normal mucosa pink and moist  Neck: Supple, symmetric, trachea midline, no adenopathy.  No thyroid enlargement, tenderness or nodules.    Lungs: Clear to auscultation bilaterally, no wheezing or crackles present.  Respirations unlabored  Heart: Regular rate and rhythm, normal S1 and S2, no murmur, rub or gallop.  Abdomen: Soft, nontender, nondistended.  Bowel sounds active in all 4 quadrants.  No masses or organomegaly.  Extremities: Extremities normal, atraumatic.  No cyanosis or edema.  Skin: Skin color, texture, turgor normal no rashes or lesions on limited skin exam  Neurologic: CN II through XII intact, normal strength.      Galindo Srinivasan MD    "

## 2022-03-24 NOTE — PATIENT INSTRUCTIONS
Proprioception training  [email or web address]

## 2022-03-25 LAB — HCV AB SERPL QL IA: NEGATIVE

## 2022-03-25 NOTE — RESULT ENCOUNTER NOTE
I called and spoke with the patient about their recent clinic visit results. I answered any questions they had.      Dr. Galindo Dixon

## 2022-09-25 ENCOUNTER — HEALTH MAINTENANCE LETTER (OUTPATIENT)
Age: 42
End: 2022-09-25

## 2022-11-15 DIAGNOSIS — N52.9 ERECTILE DYSFUNCTION, UNSPECIFIED ERECTILE DYSFUNCTION TYPE: ICD-10-CM

## 2022-11-16 RX ORDER — SILDENAFIL 100 MG/1
100 TABLET, FILM COATED ORAL DAILY PRN
Qty: 20 TABLET | Refills: 1 | Status: SHIPPED | OUTPATIENT
Start: 2022-11-16 | End: 2022-11-28

## 2022-11-16 NOTE — TELEPHONE ENCOUNTER
"Last Written Prescription Date:  3/24/22  Last Fill Quantity: 20,  # refills: 0   Last office visit provider:  3/24/22     Requested Prescriptions   Pending Prescriptions Disp Refills     sildenafil (VIAGRA) 100 MG tablet 20 tablet 0     Sig: Take 1 tablet (100 mg) by mouth daily as needed (erectile dysfunction)       Erectile Dysfuction Protocol Passed - 11/16/2022  2:17 PM        Passed - Absence of nitrates on medication list        Passed - Absence of Alpha Blockers on Med list        Passed - Recent (12 mo) or future (30 days) visit within the authorizing provider's specialty     Patient has had an office visit with the authorizing provider or a provider within the authorizing providers department within the previous 12 mos or has a future within next 30 days. See \"Patient Info\" tab in inbasket, or \"Choose Columns\" in Meds & Orders section of the refill encounter.              Passed - Medication is active on med list        Passed - Patient is age 18 or older             Milton Alanis RN 11/16/22 2:17 PM  "

## 2022-11-18 ENCOUNTER — TELEPHONE (OUTPATIENT)
Dept: FAMILY MEDICINE | Facility: CLINIC | Age: 42
End: 2022-11-18

## 2022-11-18 NOTE — TELEPHONE ENCOUNTER
PA Initiation    Medication: sildenafil (VIAGRA) 100 MG tablet  Insurance Company:  [employer] MA  Ph[facility] Filling the Rx:  [facility]  Filling Pharmacy Phone:  [phone]  Filling Pharmacy Fax:  [phone]  Start Date:  11/16/2022

## 2022-11-21 NOTE — TELEPHONE ENCOUNTER
Central Prior Authorization Team   Phone: [phone]      PA Initiation    Medication: sildenafil (VIAGRA) 100 MG tablet  Insurance Company: [employer] - Phone [phone] Fax [phone]  Pharmacy Filling the Rx: [id number] - [place], [place] - [address] AT [facility] [address]  Filling Pharmacy Phone: [phone]  Filling Pharmacy Fax:    Start Date: 11/21/2022

## 2022-11-22 NOTE — TELEPHONE ENCOUNTER
PRIOR AUTHORIZATION DENIED    Medication: sildenafil (VIAGRA) 100 MG tablet-DENIED    Denial Date: 11/21/2022    Denial Rational:       Appeal Information: N/A

## 2022-11-28 ENCOUNTER — TELEPHONE (OUTPATIENT)
Dept: FAMILY MEDICINE | Facility: CLINIC | Age: 42
End: 2022-11-28

## 2022-11-28 DIAGNOSIS — N52.9 ERECTILE DYSFUNCTION, UNSPECIFIED ERECTILE DYSFUNCTION TYPE: ICD-10-CM

## 2022-11-28 RX ORDER — SILDENAFIL 100 MG/1
100 TABLET, FILM COATED ORAL DAILY PRN
Qty: 20 TABLET | Refills: 1 | Status: SHIPPED | OUTPATIENT
Start: 2022-11-28 | End: 2023-01-27

## 2022-11-28 NOTE — TELEPHONE ENCOUNTER
Reason for call:  Other     Patient called regarding (reason for call): call back    Additional comments: Pt wants to know if he can get another printed script for viagra. He would like to pick it up at the .    Phone number to reach patient:  Cell number on file:    Telephone Information:   Mobile [phone]       Best Time:  Any    Can we leave a detailed message on this number?  YES

## 2022-11-30 NOTE — TELEPHONE ENCOUNTER
Relayed message to Patient.  He said he will stop by either today 11/30/22 or tomorrow 12/01/22 to grab it

## 2023-01-27 ENCOUNTER — TELEPHONE (OUTPATIENT)
Dept: FAMILY MEDICINE | Facility: CLINIC | Age: 43
End: 2023-01-27
Payer: COMMERCIAL

## 2023-01-27 DIAGNOSIS — N52.9 ERECTILE DYSFUNCTION, UNSPECIFIED ERECTILE DYSFUNCTION TYPE: ICD-10-CM

## 2023-01-27 RX ORDER — SILDENAFIL 100 MG/1
100 TABLET, FILM COATED ORAL DAILY PRN
Qty: 30 TABLET | Refills: 1 | Status: SHIPPED | OUTPATIENT
Start: 2023-01-27

## 2023-01-27 NOTE — TELEPHONE ENCOUNTER
Pt requesting sildenafil script be printed and left at the  for pickup. He was unable to pick it up last month. Please call pt when ready for pickup.

## 2023-04-04 NOTE — PROCEDURES
"PICC Line Insertion Procedure Note  Pt. Name: Brent Lau   MRN: [id number]         Procedure: Insertion of a  triple Lumen  5 fr  Bard SOLO (valved) Power PICC, Lot number [id number]    Indications: Access    Contraindications : n/a    Procedure Details   Patient identified with 2 identifiers and \"Time Out\" conducted.  .     Central line insertion bundle followed: hand hygeine performed prior to procedure, site cleansed with cholraprep, hat, mask, sterile gloves,sterile gown worn, patient draped with maximum barrier head to toe drape, sterile field maintained.    The vein was assessed and found to be compressible and of adequate size. 4 ml 1% Lidocaine administered sq to the insertion site. A 5 Fr PICC was inserted into the basilic vein of the right arm with ultrasound guidance. 1 attempt(s) required to access vein.   Catheter threaded without difficulty. Good blood return noted.    Modified Seldinger Technique used for insertion.    The 8 sharps that are included in the PICC insertion kit were accounted for and disposed of in the sharps container prior to breakdown of the sterile field.    Catheter secured with Statlock, biopatch and Tegaderm dressing applied.    Findings:  Total catheter length  44 cm, with 1 cm exposed. Mid upper arm circumference is 29 cm. Catheter was flushed with 30 cc NS. Patient  tolerated procedure well.    Tip placement verified by 3CG Tip Confirmation System. Tip placement in the SVC.    CLABSI prevention brochure left at bedside.    Patient's primary RN notified PICC is ready for use.    Comments:        SARWAT Lake, RN  Mya Vascular Access  " Rhombic Flap Text: The defect edges were debeveled with a #15 scalpel blade.  Given the location of the defect and the proximity to free margins a rhombic flap was deemed most appropriate.  Using a sterile surgical marker, an appropriate rhombic flap was drawn incorporating the defect.    The area thus outlined was incised deep to adipose tissue with a #15 scalpel blade.  The skin margins were undermined to an appropriate distance in all directions utilizing iris scissors.

## 2023-04-25 ENCOUNTER — OFFICE VISIT (OUTPATIENT)
Dept: FAMILY MEDICINE | Facility: CLINIC | Age: 43
End: 2023-04-25
Payer: COMMERCIAL

## 2023-04-25 VITALS
BODY MASS INDEX: 25.48 KG/M2 | DIASTOLIC BLOOD PRESSURE: 80 MMHG | SYSTOLIC BLOOD PRESSURE: 127 MMHG | WEIGHT: 184 LBS | TEMPERATURE: 98.6 F | OXYGEN SATURATION: 100 % | RESPIRATION RATE: 16 BRPM | HEART RATE: 77 BPM

## 2023-04-25 DIAGNOSIS — K04.7 DENTAL INFECTION: Primary | ICD-10-CM

## 2023-04-25 PROCEDURE — 99213 OFFICE O/P EST LOW 20 MIN: CPT | Performed by: PHYSICIAN ASSISTANT

## 2023-04-25 RX ORDER — AMOXICILLIN 500 MG/1
500 CAPSULE ORAL 3 TIMES DAILY
Qty: 21 CAPSULE | Refills: 0 | Status: SHIPPED | OUTPATIENT
Start: 2023-04-25 | End: 2023-05-02

## 2023-04-25 NOTE — PROGRESS NOTES
URGENT CARE VISIT:    SUBJECTIVE:   Brent Lau is a 42 year old male presenting with a chief complaint of upper right dental pain.  Onset was 5 day(s) ago.   He denies the following symptoms: stuffy nose and cough - non-productive  Course of illness is worsening.    Treatment measures tried include Tylenol/Ibuprofen with no relief of symptoms.  Predisposing factors include None.    PMH:   Past Medical History:   Diagnosis Date     Acid reflux      Allergies: Patient has no known allergies.   Medications:   Current Outpatient Medications   Medication Sig Dispense Refill     amoxicillin (AMOXIL) 500 MG capsule Take 1 capsule (500 mg) by mouth 3 times daily for 7 days 21 capsule 0     ibuprofen (ADVIL/MOTRIN) 100 MG tablet Take 100 mg by mouth every 4 hours as needed       naltrexone (DEPADE/REVIA) 50 MG tablet Take 1 tablet (50 mg) by mouth daily (Patient not taking: Reported on 2023) 30 tablet 2     sildenafil (VIAGRA) 100 MG tablet Take 1 tablet (100 mg) by mouth daily as needed (erectile dysfunction) (Patient not taking: Reported on 2023) 30 tablet 1     triamcinolone (KENALOG) 0.1 % external ointment Apply sparingly to affected area two times daily (Patient not taking: Reported on 2023) 30 g 0     Social History:   Social History     Tobacco Use     Smoking status: Every Day     Packs/day: 1.00     Types: Cigarettes     Start date:      Last attempt to quit:      Years since quittin.3     Smokeless tobacco: Former   Vaping Use     Vaping status: Not on file   Substance Use Topics     Alcohol use: Not Currently     Comment: recovering alcoholic       ROS:  Review of systems negative except as stated above.    OBJECTIVE:  /80 (BP Location: Right arm, Patient Position: Sitting, Cuff Size: Adult Large)   Pulse 77   Temp 98.6  F (37  C) (Tympanic)   Resp 16   Wt 83.5 kg (184 lb)   SpO2 100%   BMI 25.48 kg/m    GENERAL APPEARANCE: healthy, alert and no distress  EYES: EOMI,   PERRL, conjunctiva clear  HENT: ear canals and TM's normal.  Poor dentition throughout. A few missing molars. Mild right cheek edema  NECK: supple, nontender, no lymphadenopathy  RESP: lungs clear to auscultation - no rales, rhonchi or wheezes  CV: regular rates and rhythm, normal S1 S2, no murmur noted  SKIN: no suspicious lesions or rashes      ASSESSMENT:    ICD-10-CM    1. Dental infection  K04.7 amoxicillin (AMOXIL) 500 MG capsule          PLAN:  Patient Instructions   Patient was educated on natural course of infection.  Conservative measures discussed including cool compresses, salt water gargles, and over-the-counter analgesics (Tylenol or Ibuprofen). Follow-up with your dentist as soon as possible for further management. Seek emergency care if you develop fever, worsening pain/swelling, or inability to swallow.     Patient verbalized understanding and is agreeable to plan. The patient was discharged ambulatory and in stable condition.    Priscilla Reddy PA-C ....................  4/25/2023   3:21 PM

## 2023-04-25 NOTE — PATIENT INSTRUCTIONS
Patient was educated on natural course of infection.  Conservative measures discussed including cool compresses, salt water gargles, and over-the-counter analgesics (Tylenol or Ibuprofen). Follow-up with your dentist as soon as possible for further management. Seek emergency care if you develop fever, worsening pain/swelling, or inability to swallow.

## 2023-05-13 ENCOUNTER — HEALTH MAINTENANCE LETTER (OUTPATIENT)
Age: 43
End: 2023-05-13

## 2023-12-14 ENCOUNTER — NURSE TRIAGE (OUTPATIENT)
Dept: FAMILY MEDICINE | Facility: CLINIC | Age: 43
End: 2023-12-14
Payer: COMMERCIAL

## 2023-12-14 DIAGNOSIS — U07.1 INFECTION DUE TO 2019 NOVEL CORONAVIRUS: Primary | ICD-10-CM

## 2023-12-14 NOTE — TELEPHONE ENCOUNTER
FYI - Status Update    Who is Calling: patient    Update: pt states he is positive for Covid again and would like treatment options     Does caller want a call/response back: Yes     Could we send this information to you in [employer] or would you prefer to receive a phone call?:   Patient would prefer a phone call   Okay to leave a detailed message?: Yes at Cell number on file:    Telephone Information:   Mobile [phone]

## 2023-12-14 NOTE — TELEPHONE ENCOUNTER
"Nurse Triage SBAR    Is this a 2nd Level Triage? NO    Situation:   \"pt states he is positive for Covid again and would like treatment options\"    Background:   Age 43  Smoker  Alcohol dependence in remission  Wt 83.5 kg (184 lb)  with BMI 25.48 kg/m  from OV 4/25/23.    GFR >90  ALT, AST, Bili, and Alk Phos are WNL on 1/24/22.    Sildenafil for ED- instruct patient to stop taking sildenafil while taking Paxlovid and restart sildenafil 3 days after the completion of Paxlovid.    Last COVID infection was about 2 years ago. Ended up in coma las time when COVID.     Residential home and some pt was sick.     No known exposure to influenza or COVID infections.      Assessment:   Fever or chills- no  Cough- yes, clear phlegm  Shortness of breath or difficulty breathing- no  Chest pain- no  Fatigue- no  Muscle or body aches- slight  Headache- no  New loss of taste or smell- no  Sore throat- yes  Congestion or runny nose- no  Nausea or vomiting- nauseous yesterday but feeling okay today; no vomiting.  Diarrhea- no    Sx started yesterday with itchy throat. Positive test today.     Able to urinate in the past 12 hours.    Protocol Recommended Disposition:   No disposition on file.    Recommendation: Care advice given. Rx sent in for Paxlovid per RN COVID-19 [place] Protocol.      Rx sent in per protocol.    Does the patient meet one of the following criteria for ADS visit consideration? 16+ years old, with an [facility] PCP     TIP  Providers, please consider if this condition is appropriate for management at one of our Acute and Diagnostic Services sites.     If patient is a good candidate, please use dotphrase <dot>triageresponse and select Refer to ADS to document.    Reason for Disposition   HIGH RISK patient (e.g., weak immune system, age > 64 years, obesity with BMI of 30 or higher, pregnant, chronic lung disease or other chronic medical condition) and COVID symptoms (e.g., cough, fever)  (Exceptions: Already seen by doctor or " NP/PA and no new or worsening symptoms.)    Additional Information   Negative: SEVERE difficulty breathing (e.g., struggling for each breath, speaks in single words)   Negative: Difficult to awaken or acting confused (e.g., disoriented, slurred speech)   Negative: Bluish (or gray) lips or face now   Negative: Shock suspected (e.g., cold/pale/clammy skin, too weak to stand, low BP, rapid pulse)   Negative: Sounds like a life-threatening emergency to the triager   Negative: Diagnosed or suspected COVID-19 and symptoms lasting 3 or more weeks   Negative: COVID-19 exposure and no symptoms   Negative: COVID-19 vaccine reaction suspected (e.g., fever, headache, muscle aches) occurring 1 to 3 days after getting vaccine   Negative: COVID-19 vaccine, questions about   Negative: Lives with someone known to have influenza (flu test positive) and flu-like symptoms (e.g., cough, runny nose, sore throat, SOB; with or without fever)   Negative: Possible COVID-19 symptoms and triager concerned about severity of symptoms or other causes   Negative: COVID-19 and breastfeeding, questions about   Negative: SEVERE or constant chest pain or pressure  (Exception: Mild central chest pain, present only when coughing.)   Negative: MODERATE difficulty breathing (e.g., speaks in phrases, SOB even at rest, pulse 100-120)   Negative: Headache and stiff neck (can't touch chin to chest)   Negative: Oxygen level (e.g., pulse oximetry) 90% or lower   Negative: Chest pain or pressure  (Exception: MILD central chest pain, present only when coughing.)   Negative: Drinking very little and dehydration suspected (e.g., no urine > 12 hours, very dry mouth, very lightheaded)   Negative: Patient sounds very sick or weak to the triager   Negative: MILD difficulty breathing (e.g., minimal/no SOB at rest, SOB with walking, pulse <100)   Negative: Fever > 103 F (39.4 C)   Negative: Fever > 101 F (38.3 C) and over 60 years of age   Negative: Fever > 100.0 F (37.8  C) and bedridden (e.g., CVA, chronic illness, recovering from surgery)    Protocols used: Coronavirus (COVID-19) Diagnosed or [id number]      RN COVID TREATMENT VISIT  12/14/23      The patient has been triaged and does not require a higher level of care.    Brent Lau  43 year old  Current weight? 184 lbs    Has the patient been seen by a primary care provider at an [facility] or [facility] within the past two years? Yes.   Have you been in close proximity to/do you have a known exposure to a person with a confirmed case of influenza? No.     General treatment eligibility:  Date of positive COVID test (PCR or at home)?  12/14/23    Are you or have you been hospitalized for this COVID-19 infection? No.   Have you received monoclonal antibodies or antiviral treatment for COVID-19 since this positive test? No.   Do you have any of the following conditions that place you at risk of being very sick from COVID-19?   - Age 50 years or older  - Overweight or Obesity (BMI >85th percentile or BMI 25 or higher)  - Current or former smoker  Yes, patient has at least one high risk condition as noted above.     Current COVID symptoms:   - cough  - sore throat  - nausea or vomiting  Yes. Patient has at least one symptom as selected.     How many days since symptoms started? 5 days or less. Established patient, 12 years or older weighing at least 88.2 lbs, who has symptoms that started in the past 5 days, has not been hospitalized nor received treatment already, and is at risk for being very sick from COVID-19.     Treatment eligibility by RN:  Are you currently pregnant or nursing? No  Do you have a clinically significant hypersensitivity to nirmatrelvir or ritonavir, or toxic epidermal necrolysis (TEN) or Fields-Ant Syndrome? No  Do you have a history of hepatitis, any hepatic impairment on the Problem List (such as Child-Fleming Class C, cirrhosis, fatty liver disease, alcoholic liver  disease), or was the last liver lab (hepatic panel, ALT, AST, ALK Phos, bilirubin) elevated in the past 6 months? No  Do you have any history of severe renal impairment (eGFR < 30mL/min)? No    Is patient eligible to continue? Yes, patient meets all eligibility requirements for the RN COVID treatment (as denoted by all no responses above).     Current Outpatient Medications   Medication Sig Dispense Refill    ibuprofen (ADVIL/MOTRIN) 100 MG tablet Take 100 mg by mouth every 4 hours as needed      naltrexone (DEPADE/REVIA) 50 MG tablet Take 1 tablet (50 mg) by mouth daily (Patient not taking: Reported on 4/25/2023) 30 tablet 2    sildenafil (VIAGRA) 100 MG tablet Take 1 tablet (100 mg) by mouth daily as needed (erectile dysfunction) (Patient not taking: Reported on 4/25/2023) 30 tablet 1    triamcinolone (KENALOG) 0.1 % external ointment Apply sparingly to affected area two times daily (Patient not taking: Reported on 4/25/2023) 30 g 0       Medications from List 1 of the standing order (on medications that exclude the use of Paxlovid) that patient is taking: NONE. Is patient taking Jami's Wort? No  Is patient taking Jami's Wort or any meds from List 1? No.   Medications from List 2 of the standing order (on meds that provider needs to adjust) that patient is taking: NONE. Is patient on any of the meds from List 2? No.   Medications from List 3 of standing order (on meds that a RN needs to adjust) that patient is taking: sildenafil (Viagra, Revatio): Is patient using for erectile dysfunction? Yes, instructed patient to stop taking sildenafil while taking Paxlovid and restart sildenafil 3 days after the completion of Paxlovid. Is patient on any meds from List 3? Yes. Patient is on meds from list 3. No meds require a provider visit and at least one med required RN to adjust.     Paxlovid has an approximate 90% reduction in hospitalization. Paxlovid can possibly cause altered sense of taste, diarrhea (loose,  watery stools), high blood pressure, muscle aches.     Would patient like a Paxlovid prescription?   Yes.   Lab Results   Component Value Date    GFRESTIMATED >90 01/24/2022       Was last eGFR reduced? No, eGFR 60 or greater/ No Result on record. Patient can receive the normal renal function dose. Paxlovid Rx sent to [facility]   [place]    Temporary change to home medications: stop taking sildenafil while taking Paxlovid and restart sildenafil 3 days after the completion of Paxlovid.    All medication adjustments (holds, etc) were discussed with the patient and patient was asked to repeat back (teachback) their med adjustment.  Did patient understand med adjustment? Yes, patient repeated back and understood correctly.        Reviewed the following instructions with the patient:    Paxlovid (nimatrelvir and ritonavir)    How it works  Two medicines (nirmatrelvir and ritonavir) are taken together. They stop the virus from growing. Less amount of virus is easier for your body to fight.    How to take  Medicine comes in a daily container with both medicine tablets. Take by mouth twice daily (once in the morning, once at night) for 5 days.  The number of tablets to take varies by patient.  Don't chew or break capsules. Swallow whole.    When to take  Take as soon as possible after positive COVID-19 test result, and within 5 days of your first symptoms.    Possible side effects  Can cause altered sense of taste, diarrhea (loose, watery stools), high blood pressure, muscle aches.    Forrest Henderson, JESENIAN, RN  [facility]

## 2024-01-17 ENCOUNTER — OFFICE VISIT (OUTPATIENT)
Dept: FAMILY MEDICINE | Facility: CLINIC | Age: 44
End: 2024-01-17
Payer: COMMERCIAL

## 2024-01-17 VITALS
TEMPERATURE: 98.9 F | OXYGEN SATURATION: 98 % | RESPIRATION RATE: 16 BRPM | DIASTOLIC BLOOD PRESSURE: 76 MMHG | SYSTOLIC BLOOD PRESSURE: 129 MMHG | HEART RATE: 67 BPM

## 2024-01-17 DIAGNOSIS — R05.1 ACUTE COUGH: ICD-10-CM

## 2024-01-17 DIAGNOSIS — F17.200 SMOKER: Primary | ICD-10-CM

## 2024-01-17 PROCEDURE — 99213 OFFICE O/P EST LOW 20 MIN: CPT | Performed by: PHYSICIAN ASSISTANT

## 2024-01-17 RX ORDER — AZITHROMYCIN 500 MG/1
500 TABLET, FILM COATED ORAL DAILY
Qty: 5 TABLET | Refills: 0 | Status: SHIPPED | OUTPATIENT
Start: 2024-01-17 | End: 2024-01-22

## 2024-01-17 RX ORDER — BENZONATATE 100 MG/1
100 CAPSULE ORAL 3 TIMES DAILY PRN
Qty: 30 CAPSULE | Refills: 0 | Status: SHIPPED | OUTPATIENT
Start: 2024-01-17 | End: 2024-01-27

## 2024-01-17 RX ORDER — ALBUTEROL SULFATE 90 UG/1
2 AEROSOL, METERED RESPIRATORY (INHALATION) EVERY 6 HOURS
Qty: 18 G | Refills: 0 | Status: SHIPPED | OUTPATIENT
Start: 2024-01-17 | End: 2024-02-08

## 2024-01-17 NOTE — PROGRESS NOTES
Patient presents with:  Cough: Has had URI sx's  wants cough checked out       Clinical Decision Making:  Patient is a smoker and had prolonged expiratory wheezes and wheezes and congestion heard in the bilateral mid to lower lung fields.  Patient is treated with benzonatate and albuterol for cough and respiratory antibiotic. Expected course of resolution and indication for return was gone over and questions were answered to patient/parent's satisfaction before discharge.        ICD-10-CM    1. Smoker  F17.200       2. Acute cough  R05.1 albuterol (PROAIR HFA/PROVENTIL HFA/VENTOLIN HFA) 108 (90 Base) MCG/ACT inhaler     benzonatate (TESSALON) 100 MG capsule     azithromycin (ZITHROMAX) 500 MG tablet          Patient Instructions   You were seen today for acute bronchitis.     Symptom management:  - Get plenty of rest  - Avoid smoking and second hand smoke  - May take tylenol or ibuprofen for fever/discomfort  - Drink plenty of non-caffeinated fluids  - Use nasal steroid spray for sinus congestion  - Albuterol inhaler may be used every 6 hours as needed for chest tightness      Reasons to be seen in the emergency room:  - Develop a fever of 100.4 or higher  - Cough changes, coughing up blood, or become short of breath  - Neck stiffness  - Chest pain  - Severe headache  - Unable to tolerate eating or drinking fluids    Otherwise, if no symptom improvement after 5 days, follow-up with your primary care provider.        HPI:  Brent Lau is a 43 year old male who presents today for a 2-day history of cough and congestion.  Cough has been productive of off-color yellow to green mucus.  Patient has not had fever chills night sweats vomiting or diarrhea.  No shortness of breath dyspnea on exertion or pleuritic chest pain.  Patient is a current smoker.  He has not had sick contacts at home or work.  No COVID testing was performed at home and the patient declines COVID testing in the office.    History obtained from chart  review and the patient.    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  2022: Dyshidrotic eczema  2022: Sleep apnea  2022: Respiratory failure (H)  2022: Tachypnea  2022: Sinus tachycardia  2022: Lactic acidosis  2022: Acute respiratory failure with hypoxia (H)  2022: High anion gap metabolic acidosis  2020-10: Gastroesophageal reflux disease, unspecified whether   esophagitis present  2019: H/O echocardiogram  2014: Smoker      Past Medical History:   Diagnosis Date    Acid reflux        Social History     Tobacco Use    Smoking status: Every Day     Packs/day: 1     Types: Cigarettes     Start date:      Last attempt to quit:      Years since quittin.0    Smokeless tobacco: Former   Substance Use Topics    Alcohol use: Not Currently     Comment: recovering alcoholic       Review of Systems  As above in HPI otherwise negative.    Vitals:    24 1010   BP: 129/76   Pulse: 67   Resp: 16   Temp: 98.9  F (37.2  C)   TempSrc: Oral   SpO2: 98%       General: Patient is resting comfortably no acute distress is afebrile  HEENT: Head is normocephalic atraumatic   eyes are PERRL EOMI sclera anicteric   TMs are clear bilaterally  Throat is with mild pharyngeal wall erythema and no exudate  No cervical lymphadenopathy present  LUNGS: Are with prolonged expiratory wheezes in the bilateral mid to lower lung fields and congestion heard in the upper lung field.  Normal respiratory effort and excursion.    HEART: Regular rate and rhythm  Skin: Without rash non-diaphoretic    Physical Exam    At the end of the encounter, I discussed results, diagnosis, medications. Discussed red flags for immediate return to clinic/ER, as well as indications for follow up if no improvement. Patient understood and agreed to plan. Patient was stable for discharge.

## 2024-01-17 NOTE — LETTER
January 17, 2024      Brent Lau  [address] [id number]        To Whom It May Concern:    Brent Lau was seen in our clinic. He may return to work without restrictions 1/18/24.      Sincerely,        Neil Martino PA-C

## 2024-02-08 DIAGNOSIS — R05.1 ACUTE COUGH: ICD-10-CM

## 2024-02-08 RX ORDER — ALBUTEROL SULFATE 90 UG/1
2 AEROSOL, METERED RESPIRATORY (INHALATION) EVERY 6 HOURS
Qty: 18 G | Refills: 0 | Status: SHIPPED | OUTPATIENT
Start: 2024-02-08

## 2024-07-20 ENCOUNTER — HEALTH MAINTENANCE LETTER (OUTPATIENT)
Age: 44
End: 2024-07-20

## 2024-08-07 ENCOUNTER — NURSE TRIAGE (OUTPATIENT)
Dept: FAMILY MEDICINE | Facility: CLINIC | Age: 44
End: 2024-08-07

## 2024-08-07 ENCOUNTER — OFFICE VISIT (OUTPATIENT)
Dept: FAMILY MEDICINE | Facility: CLINIC | Age: 44
End: 2024-08-07
Payer: COMMERCIAL

## 2024-08-07 VITALS
OXYGEN SATURATION: 99 % | SYSTOLIC BLOOD PRESSURE: 146 MMHG | BODY MASS INDEX: 24.8 KG/M2 | RESPIRATION RATE: 20 BRPM | TEMPERATURE: 98.1 F | WEIGHT: 183.1 LBS | HEIGHT: 72 IN | DIASTOLIC BLOOD PRESSURE: 70 MMHG | HEART RATE: 86 BPM

## 2024-08-07 DIAGNOSIS — K04.7 DENTAL INFECTION: Primary | ICD-10-CM

## 2024-08-07 PROCEDURE — G2211 COMPLEX E/M VISIT ADD ON: HCPCS | Performed by: FAMILY MEDICINE

## 2024-08-07 PROCEDURE — 99213 OFFICE O/P EST LOW 20 MIN: CPT | Performed by: FAMILY MEDICINE

## 2024-08-07 ASSESSMENT — PATIENT HEALTH QUESTIONNAIRE - PHQ9: SUM OF ALL RESPONSES TO PHQ QUESTIONS 1-9: 6

## 2024-08-07 NOTE — TELEPHONE ENCOUNTER
Nurse Triage SBAR    Is this a 2nd Level Triage? YES, LICENSED PRACTITIONER REVIEW IS REQUIRED    Situation: Patient having tooth pain and headache.     Background: Patient has history of about a year ago with dental pain and needing abx.     Assessment: Patient having slight swelling in face, headache accompanied this.  Cannot tell if tooth is the cause or headache.  States looks a little inflamed, but headache was worse yesterday and now tooth.  Called dentist and cannot get in until tomorrow.     Scheduled in clinic today due to headache and tooth pain.     Protocol Recommended Disposition:   Call Dentist Today    Recommendation: Scheduled in clinic.   SARWAT Jaimes RN  [facility]            Does the patient meet one of the following criteria for ADS visit consideration? 16+ years old, with an Health system PCP     TIP  Providers, please consider if this condition is appropriate for management at one of our Acute and Diagnostic Services sites.     If patient is a good candidate, please use dotphrase <dot>triageresponse and select Refer to ADS to document.    Reason for Disposition   Patient wants to be seen    Additional Information   Negative: Pale cold skin and very weak (can't stand)   Negative: Similar pain previously and it was from 'heart attack'   Negative: Similar pain previously and it was from 'angina' and not relieved by nitroglycerin   Negative: Sounds like a life-threatening emergency to the triager   Negative: Chest pain   Negative: Toothache followed tooth injury   Negative: Patient sounds very sick or weak to the triager   Negative: Face is swollen   Negative: Fever   Negative: SEVERE toothache pain (e.g., excruciating, unable to eat, unable to do any normal activities)   Negative: Toothache present > 24 hours   Negative: Brown cavity visible in the painful tooth   Negative: Red or yellow lump present at the gum line of the painful tooth   Negative: Lost crown   Negative:  "Lost filling   Negative: Broken braces wire or end of braces wire is jabbing into gum, cheek, or tongue    Answer Assessment - Initial Assessment Questions  1. LOCATION: \"Which tooth is hurting?\"  (e.g., right-side/left-side, upper/lower, front/back)      Left back lower tooth  2. ONSET: \"When did the toothache start?\"  (e.g., hours, days)       Started yesterday  3. SEVERITY: \"How bad is the toothache?\"  (Scale 1-10; mild, moderate or severe)    - MILD (1-3): doesn't interfere with chewing     - MODERATE (4-7): interferes with chewing, interferes with normal activities, awakens from sleep      - SEVERE (8-10): unable to eat, unable to do any normal activities, excruciating pain         Upper left back tooth - comes and goes and it is about a 10  4. SWELLING: \"Is there any visible swelling of your face?\"      Yes a little  5. OTHER SYMPTOMS: \"Do you have any other symptoms?\" (e.g., fever)      Had one last night - just slight. No other symptoms.  Still has a headache - sharp headache  6. PREGNANCY: \"Is there any chance you are pregnant?\" \"When was your last menstrual period?\"      no    Protocols used: Toothache-A-[place]    "

## 2024-08-07 NOTE — PROGRESS NOTES
Assessment & Plan     Dental infection    - amoxicillin-clavulanate (AUGMENTIN) 875-125 MG tablet; Take 1 tablet by mouth 2 times daily for 7 days    Follow-up with dentist    The longitudinal plan of care for the diagnosis(es)/condition(s) as documented were addressed during this visit. Due to the added complexity in care, I will continue to support Brent in the subsequent management and with ongoing continuity of care.  Nicotine/Tobacco Cessation  He reports that he has been smoking cigarettes. He started smoking about 25 years ago. He has a 23 pack-year smoking history. He has quit using smokeless tobacco.  Nicotine/Tobacco Cessation Plan  Self help information given to patient            Subjective   Bernt is a 44 year old, presenting with a left lower molar tooth infection requesting antibiotics until seen by his dentist in a couple days.  He has no other concerns        8/7/2024     9:19 AM   Additional Questions   Roomed by Marcus Scruggs Paw     Dental Pain                       Objective    BP (!) 146/70   Pulse 86   Temp 98.1  F (36.7  C) (Oral)   Resp 20   Ht 1.829 m (6')   Wt 83.1 kg (183 lb 1.6 oz)   SpO2 99%   BMI 24.83 kg/m    Body mass index is 24.83 kg/m .    Physical Exam               Signed Electronically by: Ke Ribeiro MD

## 2025-09-04 ENCOUNTER — OFFICE VISIT (OUTPATIENT)
Dept: FAMILY MEDICINE | Facility: CLINIC | Age: 45
End: 2025-09-04
Payer: COMMERCIAL

## 2025-09-04 VITALS
OXYGEN SATURATION: 97 % | WEIGHT: 193 LBS | TEMPERATURE: 97.3 F | BODY MASS INDEX: 26.14 KG/M2 | DIASTOLIC BLOOD PRESSURE: 80 MMHG | RESPIRATION RATE: 18 BRPM | SYSTOLIC BLOOD PRESSURE: 136 MMHG | HEIGHT: 72 IN | HEART RATE: 75 BPM

## 2025-09-04 DIAGNOSIS — F17.200 SMOKER: ICD-10-CM

## 2025-09-04 DIAGNOSIS — Z12.11 SCREEN FOR COLON CANCER: ICD-10-CM

## 2025-09-04 DIAGNOSIS — R68.82 LOW LIBIDO: ICD-10-CM

## 2025-09-04 DIAGNOSIS — Z00.00 ROUTINE GENERAL MEDICAL EXAMINATION AT A HEALTH CARE FACILITY: Primary | ICD-10-CM

## 2025-09-04 DIAGNOSIS — F10.21 ALCOHOL DEPENDENCE IN REMISSION (H): ICD-10-CM

## 2025-09-04 DIAGNOSIS — Z01.84 IMMUNITY STATUS TESTING: ICD-10-CM

## 2025-09-04 DIAGNOSIS — G47.30 SLEEP APNEA, UNSPECIFIED TYPE: ICD-10-CM

## 2025-09-04 DIAGNOSIS — K21.9 GASTROESOPHAGEAL REFLUX DISEASE, UNSPECIFIED WHETHER ESOPHAGITIS PRESENT: ICD-10-CM

## 2025-09-04 LAB
ERYTHROCYTE [DISTWIDTH] IN BLOOD BY AUTOMATED COUNT: 13.4 % (ref 10–15)
HCT VFR BLD AUTO: 42.1 % (ref 40–53)
HGB BLD-MCNC: 14.2 G/DL (ref 13.3–17.7)
MCH RBC QN AUTO: 30.2 PG (ref 26.5–33)
MCHC RBC AUTO-ENTMCNC: 33.7 G/DL (ref 31.5–36.5)
MCV RBC AUTO: 89.6 FL (ref 78–100)
PLATELET # BLD AUTO: 136 10E3/UL (ref 150–450)
RBC # BLD AUTO: 4.7 10E6/UL (ref 4.4–5.9)
WBC # BLD AUTO: 5.72 10E3/UL (ref 4–11)

## 2025-09-04 RX ORDER — VARENICLINE TARTRATE 0.5 (11)-1
KIT ORAL
Qty: 53 TABLET | Refills: 0 | Status: SHIPPED | OUTPATIENT
Start: 2025-09-04

## 2025-09-04 SDOH — HEALTH STABILITY: PHYSICAL HEALTH: ON AVERAGE, HOW MANY DAYS PER WEEK DO YOU ENGAGE IN MODERATE TO STRENUOUS EXERCISE (LIKE A BRISK WALK)?: 2 DAYS

## 2025-09-04 SDOH — HEALTH STABILITY: PHYSICAL HEALTH: ON AVERAGE, HOW MANY MINUTES DO YOU ENGAGE IN EXERCISE AT THIS LEVEL?: 20 MIN

## 2025-09-04 ASSESSMENT — PAIN SCALES - GENERAL: PAINLEVEL_OUTOF10: NO PAIN (0)
